# Patient Record
Sex: FEMALE | Race: ASIAN | NOT HISPANIC OR LATINO | Employment: FULL TIME
[De-identification: names, ages, dates, MRNs, and addresses within clinical notes are randomized per-mention and may not be internally consistent; named-entity substitution may affect disease eponyms.]

---

## 2017-06-24 ENCOUNTER — HEALTH MAINTENANCE LETTER (OUTPATIENT)
Age: 27
End: 2017-06-24

## 2019-03-05 ENCOUNTER — OFFICE VISIT - HEALTHEAST (OUTPATIENT)
Dept: FAMILY MEDICINE | Facility: CLINIC | Age: 29
End: 2019-03-05

## 2019-03-05 ENCOUNTER — COMMUNICATION - HEALTHEAST (OUTPATIENT)
Dept: TELEHEALTH | Facility: CLINIC | Age: 29
End: 2019-03-05

## 2019-03-05 DIAGNOSIS — Z01.84 IMMUNITY STATUS TESTING: ICD-10-CM

## 2019-03-05 ASSESSMENT — MIFFLIN-ST. JEOR: SCORE: 1205.79

## 2019-03-06 ENCOUNTER — COMMUNICATION - HEALTHEAST (OUTPATIENT)
Dept: FAMILY MEDICINE | Facility: CLINIC | Age: 29
End: 2019-03-06

## 2019-03-06 LAB
HBV SURFACE AB SERPL IA-ACNC: POSITIVE M[IU]/ML
VZV IGG SER QL IA: POSITIVE

## 2020-03-13 ENCOUNTER — VIRTUAL VISIT (OUTPATIENT)
Dept: FAMILY MEDICINE | Facility: OTHER | Age: 30
End: 2020-03-13

## 2020-03-17 NOTE — PROGRESS NOTES
"Date: 2020 17:05:57  Clinician: Jane Gracia  Clinician NPI: [id number]  Patient: Jose Lizama  Patient : 1990  Patient Address: [address] [place] [id number]  Patient Phone: [phone]  Visit Protocol: URI  Patient Summary:  Jose is a 30 year old ( : 1990 ) female who initiated a Visit for COVID-19 (Coronavirus) evaluation and screening. When asked the question \"Please sign me up to receive news, health information and promotions from [employer].\", Jose responded \"No\".    Jose states her symptoms started suddenly 7-9 days ago.   Her symptoms consist of rhinitis, malaise, a cough, a headache, and facial pain or pressure.   Symptom details     Nasal secretions: The color of her mucus is yellow.    Cough: Jose coughs every 5-10 minutes and her cough is not more bothersome at night. Phlegm comes into her throat when she coughs. She does not believe her cough is caused by post-nasal drip. The color of the phlegm is yellow.     Facial pain or pressure: The facial pain or pressure feels worse when bending over or leaning forward.     Headache: She states the headache is moderate (4-6 on a 10 point pain scale).      Jose denies having wheezing, ear pain, sore throat, nasal congestion, fever, teeth pain, chills, and myalgias. She also denies double sickening (worsening symptoms after initial improvement), taking antibiotic medication for the symptoms, having recent facial or sinus surgery in the past 60 days, and having a sinus infection within the past year. She is not experiencing dyspnea.   Precipitating events  She has not recently been exposed to someone with influenza. Jose has been in close contact with the following high risk individuals: children under the age of 5.   Pertinent COVID-19 (Coronavirus) information  Jose has not traveled internationally in the last 14 days before the start of her symptoms.   Jose has not had close contact with a laboratory confirmed " positive COVID-19 patient within 14 days of symptom onset.   Pertinent medical history  Jose typically gets a yeast infection when she takes antibiotics. She has used fluconazole (Diflucan) to treat previous yeast infections. 2 doses of fluconazole (Diflucan) has typically been needed for symptoms to resolve in the past.  Jose does not need a return to work/school note.   Weight: 126 lbs   Jose smokes or uses smokeless tobacco.   She denies pregnancy and denies breastfeeding. She does not menstruate.   Additional information as reported by the patient (free text): I am on day 7 of symptoms, which are better now than they were-- I was off and on feverish (100.4-102.6) but haven't had a fever in 3 days. I had shortness of breath and respiratory depression during that time, am not experiencing SOB currently, however it is painful to inhale (painful burning sensation in my chest), and though I am a cigarette smoker I have drastically reduced my intake to almost nothing since I became sick. 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   Weight: 126 lbs    MEDICATIONS: Alexey (30) oral, ALLERGIES: nickel  Clinician Response:  Dear Jose,  Based on the information provided, you have acute bacterial sinusitis, also known as a sinus infection. Sinus infections are caused by bacteria or a virus and symptoms are almost always identical. The difference between the 2 types of infections is timing.  Sinus infections start as viral infections and symptoms improve on their own in about 7 days. If symptoms have not improved after 7 days or have even worsened, a bacterial infection may have developed.  Medication information  I am prescribing:       Doxycycline hyclate 100 mg oral tablet. Take 1 tablet by mouth 2 times per day for 7 days. There are no refills with this prescription.      Ventolin HFA 90 mcg/actuation aerosol inhaler. Inhale 2 puffs every 4-6 hours as needed for 5 days. There are no  refills with this prescription.     Certain antibiotics such as Doxycycline, levofloxacin, and moxifloxacin can cause your skin to be more sensitive to the sun. Exposure to the sun when taking these antibiotics may cause skin rash, itching, redness, or a severe sunburn. Be sure to avoid prolonged or direct exposure to the sun, especially between 10 am and 3 pm, if possible. Wear protective clothing and use sunscreen of SPF 30 or higher when you are outdoors.  Yeast infections can be a common side effect of antibiotics. The most common symptom of a yeast infection is itchiness in and around the vagina. Other signs and symptoms include burning, redness, or a thick, white vaginal discharge that looks like cottage cheese and does not have a bad smell.  If you become pregnant during this course of treatment, stop taking the medication and contact your primary care provider.  Unless you are allergic to the over-the-counter medication(s) below, I recommend using:       Acetaminophen (Tylenol or store brand) oral tablet. Take 1-2 tablets by mouth every 4-6 hours to help with the discomfort.      Ibuprofen (Advil or store brand) 200 mg oral tablet. Take 1-3 tablets (200-600 mg) by mouth every 8 hours to help with the discomfort. Make sure to take the ibuprofen with food. Do not exceed 2400 mg in 24 hours.    A decongestant such as Sudafed PE or store brand.      Guaifenesin + dextromethorphan (Robitussin DM, Mucinex DM, or store brand).     Over-the-counter medications do not require a prescription. Ask the pharmacist if you have any questions.  Self care  The following tips will keep you as comfortable as possible while you recover:     Rest    Drink plenty of water and other liquids    Take a hot shower to loosen congestion    Take a spoonful of honey to reduce your cough     Also, as your provider, I need you to know that becoming tobacco-free is the most important thing you can do to protect your current and future  health.  When to seek care  Please be seen in a clinic or urgent care if any of the following occur:     Symptoms do not start to improve after 3 days of treatment    New symptoms develop, or symptoms become worse     It is possible to have an allergic reaction to an antibiotic even if you have not had one in the past. If you notice a new rash, significant swelling, or difficulty breathing, stop taking this medication immediately and go to a clinic or urgent care.  Additional treatment plan   Dear Jose,  Based on the information you have provided, it does not appear you need Coronavirus (COVID-19) testing.   At this time, we recommend testing primarily for those people who have symptoms of cough and fever and have either traveled to a known area of infection or have been exposed to someone with laboratory confirmed Coronavirus by close contact.   Coronavirus - General Information:   The coronavirus infection starts within 14 days of an exposure.  Symptoms are those of a respiratory infection (such as fever, cough).   If you have not had symptoms by day 15, you should be considered uninfected by coronavirus.   Coronavirus - Symptoms:    The coronavirus can cause a respiratory illness, such as bronchitis or pneumonia.  The most common symptoms are: cough, fever, and shortness of breath.   Other symptoms are: body aches, chills, diarrhea, fatigue, headache, runny nose, and sore throat   Coronavirus - Exposure Risk Factors:   Exposure to a person who has been diagnosed with coronavirus.  Travel from an area with recent local transmission of coronavirus.  The CDC (www.cdc.gov) has the most up-to-date list of where the coronavirus outbreak is occurring.   Coronavirus - Spreading:    The virus likely spreads through respiratory droplets produced when a person coughs or sneezes. These respiratory droplets can travel approximately 6 feet and can remain on surfaces. Common disinfectants will kill the virus.  The CDC  currently does not recommend healthy people wear masks.   Coronavirus - Protect Yourself:    Avoid close contact with people known to have this new coronavirus infection.  Wash hands often with soap and water or alcohol-based hand .  Avoid touching the eyes, nose or mouth.   Thank you for limiting contact with others, wearing a simple mask to cover your cough, practice good hand hygiene habits and accessing our virtual services where possible to limit the spread of this virus.  For more information about COVID19 and options for caring for yourself at home, please visit the CDC website at https://www.cdc.gov/coronavirus/2019-ncov/about/steps-when-sick.html   For more options for care at [facility], please visit our website at [email or web address]     Diagnosis: Cough  Diagnosis ICD: R05  Prescription: Ventolin HFA 90 mcg/actuation inhalation HFA aerosol inhaler 1 200 inhalation canister, 5 days supply. Inhale 2 puffs every 4-6 hours as needed for 5 days. Refills: 0, Refill as needed: no, Allow substitutions: yes  Prescription: doxycycline hyclate 100 mg oral tablet 14 tablet, 7 days supply. Take 1 tablet by mouth 2 times per day for 7 days. Refills: 0, Refill as needed: no, Allow substitutions: yes  Pharmacy: [id number] - [phone] - [address] [place] [address]

## 2020-03-31 ENCOUNTER — VIRTUAL VISIT (OUTPATIENT)
Dept: FAMILY MEDICINE | Facility: OTHER | Age: 30
End: 2020-03-31

## 2020-03-31 NOTE — PROGRESS NOTES
"Date: 2020 13:32:51  Clinician: Filemon Dong  Clinician NPI: [id number]  Patient: Jose Lizama  Patient : 1990  Patient Address: [address] [place] [address]  Patient Phone: [phone]  Visit Protocol: URI  Patient Summary:  Jose is a 30 year old ( : 1990 ) female who initiated a Visit for COVID-19 (Coronavirus) evaluation and screening. When asked the question \"Please sign me up to receive news, health information and promotions from [employer].\", Jose responded \"No\".    Jose states her symptoms started suddenly 2-3 weeks ago. After her symptoms started, they improved and then got worse again.   Her symptoms consist of a headache, a cough, and enlarged lymph nodes.   Symptom details     Cough: Jose coughs a few times an hour and her cough is more bothersome at night. Phlegm comes into her throat when she coughs. She does not believe her cough is caused by post-nasal drip. The color of the phlegm is yellow.     Headache: She states the headache is mild (1-3 on a 10 point pain scale).      Jose denies having rhinitis, teeth pain, fever, facial pain or pressure, myalgias, chills, wheezing, sore throat, nasal congestion, malaise, and ear pain. She also denies having a sinus infection within the past year, taking antibiotic medication for the symptoms, and having recent facial or sinus surgery in the past 60 days. She is not experiencing dyspnea.   Precipitating events  She has not recently been exposed to someone with influenza. Jose has not been in close contact with any high risk individuals.   Pertinent COVID-19 (Coronavirus) information  Jose has not traveled internationally or to the areas where COVID-19 (Coronavirus) is widespread, including cruise ship travel in the last 14 days before the start of her symptoms.   Jose has not had a close contact with a laboratory-confirmed COVID-19 patient within 14 days of symptom onset. She also has not had a close contact " with a suspected COVID-19 patient within 14 days of symptom onset.   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 She provides direct patient care. She does not live with a healthcare worker.   Pertinent medical history  Jose typically gets a yeast infection when she takes antibiotics. She has used fluconazole (Diflucan) to treat previous yeast infections. 2 doses of fluconazole (Diflucan) has typically been needed for symptoms to resolve in the past.  Jose needs a return to work/school note.   Weight: 126 lbs   Jose smokes or uses smokeless tobacco.   She denies pregnancy and denies breastfeeding. She has menstruated in the past month.   Additional information as reported by the patient (free text): I need a return to work note by tomorrow &amp; a note stating that you recommended I quarantine based off of my symptoms. I woke up on 03/04 with a headache, cough, chills, body aches, plus SOB and wheezing when lying down and at night. Had a fever ranging from 100.6-102.6 for 3-4 days. I have been fever free for weeks but my cough was lingering and work didn't want me. I had been coughing up clear or brown mucous which I attributed to my cigarette use, now to 0.5/day. Been isolating.Call me please.   Weight: 126 lbs  A synchronous phone visit was initiated by the provider for the following reason: Patient requesting phone call in order to review current symptoms and return to work recommendations.    MEDICATIONS: Jasmiel (30) oral, ALLERGIES: nickel  Clinician Response:  Dear Jose,   Based on the information you have provided, you do have symptoms that are consistent with Coronavirus (COVID-19).  The coronavirus causes mild to severe respiratory illness with the most common symptoms including fever, cough and difficulty breathing. Unfortunately, many viruses cause similar symptoms and it can be difficult to distinguish between viruses, especially in mild cases, so  we are presuming that anyone with cough or fever has coronavirus at this time.  Coronavirus/COVID-19 has reached the point of community spread in [place], meaning that we are finding the virus in people with no known exposure risk for madelaine the virus. Given the increasing commonness of coronavirus in the community we are no longer testing patients who are not critically ill.  If you are a health care worker, you should refer to your employee health office for instructions about testing and returning to work.  For everyone else who has cough or fever, you should assume you are infected with coronavirus. Since you will not be tested but have symptoms that may be consistent with coronavirus, the CDC recommends you stay in self-isolation until these three things have happened:    You have had no fever for at least 72 hours (that is three full days of no fever without the use of medicine that reduces fevers)    AND   Other symptoms have improved (for example, when your cough or shortness of breath have improved)   AND   At least 7 days have passed since your symptoms first appeared.   How to Isolate:   Isolate yourself at home.  Do Not allow any visitors  Do Not go to work or school  Do Not go to Buddhism,  centers, shopping, or other public places.  Do Not shake hands.  Avoid close contact with others (hugging, kissing).   Protect Others:   Cover Your Mouth and Nose with a mask, disposable tissue or wash cloth to avoid spreading germs to others.  Wash your hands and face frequently with soap and water.   We know it can be scary to hear that you might have COVID-19. Our team can help track your symptoms and make sure you are doing ok over the next two weeks using a program called [employer] to keep in touch. When you receive an email from [employer], please consider enrolling in our monitoring program. There is no cost to you for monitoring. Here is a URL where you can learn more:  [email or web address]  Managing Symptoms:   At this time, we primarily recommend Tylenol (Acetaminophen) for fever or pain. If you have liver or kidney problems, contact your primary care provider for instructions on use of tylenol. Adults can take 650 mg (two 325 mg pills) by mouth every 4-6 hours as needed OR 1,000 mg (two 500 mg pills) every 8 hours as needed. MAXIMUM DAILY DOSE: 3,000mg. For children, refer to dosing on bottle based on age or weight.   If you develop significant shortness of breath that prevents you from doing normal activities, please call 911 or proceed to the nearest emergency room and alert them immediately that you have been in self-isolation for possible coronavirus.  If you have a higher risk medical condition such as cancer, heart failure, end stage renal disease on dialysis or have a transplant, please reach out to your specialist's clinic to advise them of your [facility] visit should you not improve within the next two days.   For more information about COVID19 and options for caring for yourself at home, please visit the CDC website at https://www.cdc.gov/coronavirus/2019-ncov/about/steps-when-sick.htmlFor more options for care at [facility], please visit our website at [email or web address]    COVID-19 (Coronavirus) General Information  With the increase in the number of COVID-19 (Coronavirus) cases, we understand you may have some questions. Below is some helpful information on COVID-19 (Coronavirus).  How can I protect myself and others from the COVID-19 (Coronavirus)?  Because there is currently no vaccine to prevent infection, the best way to protect yourself is to avoid being exposed to this virus. Put distance between yourself and other people if COVID-19 (Coronavirus) is spreading in your community. The virus is thought to spread mainly from person-to-person.     Between people who are in close contact with one another (within about 6  about) for a prolonged period (10 minutes or longer).    Through respiratory droplets produced when an infected person coughs or sneezes.     The CDC recommends the following additional steps to protect yourself and others:     Wash your hands often with soap and water for at least 20 seconds, especially after blowing your nose, coughing, or sneezing; going to the bathroom; and before eating or preparing food.  Use an alcohol-based hand  that contains at least 60 percent alcohol if soap and water are not available.        Avoid touching your eyes, nose and mouth with unwashed hands.    Avoid close contact with people who are sick.    Stay home when you are sick.    Cover your cough or sneeze with a tissue, then throw the tissue in the trash.    Clean and disinfect frequently touched objects and surfaces.     You can help stop COVID-19 (Coronavirus) by knowing the signs and symptoms:     Fever    Cough    Shortness of breath     Contact your healthcare provider if   Develop symptoms   AND   Have been in close contact with a person known to have COVID-19 (Coronavirus) or live in or have recently traveled from an area with ongoing spread of COVID-19 (Coronavirus). Call ahead before you go to a doctor's office or emergency room. Tell them about your recent travel and your symptoms.   For the most up to date information, visit the CDC's website.  Self-monitoring  Self-monitoring means people should monitor themselves for fever by taking their temperatures twice a day and remain alert for a cough or difficulty breathing.  It is important to check your health two times each day for 14 days after a potential exposure to a person with COVID-19 (Coronavirus) or after travel from a location where COVID-19 (Coronavirus) is widespread. If you have been exposed to a person with COVID-19 (Coronavirus), it may take up to 14 days to know if you will get sick. Follow the steps below to check and record your health.     Take  your temperature with a thermometer twice a day, once in the morning and once in the evening, and watch for a cough or difficulty breathing for 14 days.    Write down your temperature and any COVID-19 symptoms you may have: feeling feverish, coughing, or difficulty breathing.    Stay home from work or school.    Do not take public transportation, taxis, or ride-shares.    Avoid crowded places (such as shopping centers and movie theaters) and limit your activities in public.    Keep your distance from others (about 6 feet or 2 meters).    If you get sick with fever, cough, or trouble breathing, contact your healthcare provider and tell them about your recent travel and/or your symptoms.    If you need to seek medical care for other reasons, such as dialysis, call ahead to your doctor and tell them about your recent travel.     Steps to help prevent the spread of COVID-19 (Coronavirus) if you are sick  If you are sick with COVID-19 (Coronavirus) or suspect you are infected with the virus that causes COVID-19 (Coronavirus), follow the steps below to help prevent the disease from spreading&nbsp;to people in your home and community.     Stay home except to get medical care. Home isolation may be started in consultation with your healthcare clinician.    Separate yourself from other people and animals in your home.    Call ahead before visiting your doctor if you have a medical appointment.    Wear a facemask when you are around other people.    Cover your cough and sneezes.    Clean your hands often.    Avoid sharing personal household items.    Clean and disinfect frequently touched objects and surfaces everyday.    You will need to have someone drop off medications or household supplies (if needed) at your house without coming inside or in contact with you or others living in your house.    Monitor your symptoms and seek prompt medical care if your illness is worsening (e.g. Difficulty breathing).    Discontinue home  "isolation only in consultation with your healthcare provider.     For more detailed and up to date information on what to do if you are sick, visit this link: What to Do If You Are Sick With COVID-19.  Do I need to be tested for COVID-19 (Coronavirus)?     Not everyone needs to be tested for COVID-19 (Coronavirus). Decisions on which patients receive testing will be based on the local spread of COVID-19 (Coronavirus) as well as the symptoms. Your healthcare provider will make the final decision on whether you should be tested.    In the meantime, if you have concerns that you may have been exposed, it is reasonable to practice \"social distancing.\"&nbsp; If you are ill with a cold or flu-like illness, please monitor your symptoms and call your healthcare provider if your symptoms worsen.    For more up to date information, visit this link: COVID-19 (Coronavirus) Frequently Asked Questions and Answers.      Diagnosis: Cough  Diagnosis ICD: R05  Additional Clinician Notes: It was nice talking with you by phone.  Please take advantage of the [employer] which is a free service which provides a daily email or text message to ensure desired symptom improvement/resolution.  Information about this program is included in today's visit summary.  I have included the requested work note as well that allows return to work 4/1/20 as scheduled.  Take care :)  Synchronous Triage: phone, status: completed, duration: 501 seconds  "

## 2021-01-01 ENCOUNTER — TRANSFERRED RECORDS (OUTPATIENT)
Dept: MULTI SPECIALTY CLINIC | Facility: CLINIC | Age: 31
End: 2021-01-01

## 2021-01-01 LAB — PAP SMEAR - HIM PATIENT REPORTED: NORMAL

## 2021-03-21 ENCOUNTER — HEALTH MAINTENANCE LETTER (OUTPATIENT)
Age: 31
End: 2021-03-21

## 2021-06-02 VITALS — HEIGHT: 61 IN | WEIGHT: 119.8 LBS | BODY MASS INDEX: 22.62 KG/M2

## 2021-06-18 NOTE — LETTER
Letter by Adenike Charles CNP at      Author: Adenike Charles CNP Service: -- Author Type: --    Filed:  Encounter Date: 3/6/2019 Status: (Other)       Jose Lizama  [address] [facility] [address]             March 6, 2019         Dear Ms. Lizama,    Below are the results from your recent visit:    Resulted Orders   Varicella Zoster Antibody, IgG   Result Value Ref Range    Varicella Zoster Antibody IgG Positive     Narrative    Assay interference due to circulating antibodies against HIV, Hepatitis A, Hepatitis B, Hepatitis C, HAMA and Rheumatoid Factor has not been evaluated.    The assay performance in detecting antibodies to Varicella Zoster in individuals vaccinated with the FDA-licensed VZV vaccine has not been established.    Negative: Absence of detectable Varicella Zoster IgG antibodies. A negative result indicates no detectable VZV antibody, but does not rule out acute infection. It should be noted that the test usually scores negative in infected patients during the incubation period and the early stages of infection.    Equivocal: Suggest recollection.    Positive: Presence of detectable Varicella Zoster IgG antibodies. A positive result generally indicates exposure to the pathogen or administration of specific immune-globulins, but it is no indication of active infection or stage of disease.   Hepatitis B Surface Antibody (Anti-HBs) Vaccine Check   Result Value Ref Range    HBV Surface Ab (Vaccine Check) Positive Positive       You are immune to Hep B and Varicella.     Please call with questions or contact us using [employer].    Sincerely,        Electronically signed by Adenike Charles CNP

## 2021-06-24 NOTE — TELEPHONE ENCOUNTER
----- Message from Adenike Charles CNP sent at 3/6/2019  1:22 PM CST -----  Letter created for patient. Please call let her know that she is adequately immune to Help B and Varicella. She may want to  the letter to give to her new employer.   Thanks.   Adenike Charles CNP

## 2021-06-24 NOTE — TELEPHONE ENCOUNTER
LM on pt's identifiable VM notifying pt of LF's note below. Letter printed and left for pt to  at .

## 2021-06-24 NOTE — PROGRESS NOTES
Assessment/Plan:     1. 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 Needs verification of Hepatitis B and Varicella immunity. Labs drawn today.     - Varicella Zoster Antibody, IgG  - Hepatitis B Surface Antibody (Anti-HBs) Vaccine Check      Subjective:     Jose Lizama is a 29 y.o. female who presents for immunity status testing. 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 Needs titers for varicella and Hepatitis B. Other immunizations are up to date and documented. Denies other health concerns or questions.     The following portions of the patient's history were reviewed and updated as appropriate: allergies, current medications, past family history, past medical history, past surgical history and problem list.    No past medical history on file.  No past surgical history on file.  Social History     Socioeconomic History     Marital status: Single     Spouse name: Not on file     Number of children: Not on file     Years of education: Not on file     Highest education level: Not on file   Occupational History     Not on file   Social Needs     Financial resource strain: Not on file     Food insecurity:     Worry: Not on file     Inability: Not on file     Transportation needs:     Medical: Not on file     Non-medical: Not on file   Tobacco Use     Smoking status: Current Every Day Smoker     Smokeless tobacco: Never Used   Substance and Sexual Activity     Alcohol use: Not on file     Drug use: Not on file     Sexual activity: Not on file   Lifestyle     Physical activity:     Days per week: Not on file     Minutes per session: Not on file     Stress: Not on file   Relationships     Social connections:     Talks on phone: Not on file     Gets together: Not on file     Attends [employer] service: Not on file     Active member of club or organization: Not on file     Attends meetings of clubs or organizations: Not on file     Relationship status: Not on  "file     Intimate partner violence:     Fear of current or ex partner: Not on file     Emotionally abused: Not on file     Physically abused: Not on file     Forced sexual activity: Not on file   Other Topics Concern     Not on file   Social History Narrative     Not on file     No family history on file.      Review of Systems   Pertinent items are noted in HPI.     Objective:     /80   Ht 5' 1\" (1.549 m)   Wt 119 lb 12.8 oz (54.3 kg)   BMI 22.64 kg/m        General appearance: alert, appears stated age and cooperative  Neurologic: Grossly normal  Note was completed by nurse midwifery student.  I agree with the above assessment and plan completed today.  Adenike Charles APRN, CNP       "

## 2021-09-05 ENCOUNTER — HEALTH MAINTENANCE LETTER (OUTPATIENT)
Age: 31
End: 2021-09-05

## 2022-04-17 ENCOUNTER — HEALTH MAINTENANCE LETTER (OUTPATIENT)
Age: 32
End: 2022-04-17

## 2022-10-23 ENCOUNTER — HEALTH MAINTENANCE LETTER (OUTPATIENT)
Age: 32
End: 2022-10-23

## 2023-06-01 ENCOUNTER — HEALTH MAINTENANCE LETTER (OUTPATIENT)
Age: 33
End: 2023-06-01

## 2023-10-31 ENCOUNTER — LAB REQUISITION (OUTPATIENT)
Dept: LAB | Facility: CLINIC | Age: 33
End: 2023-10-31

## 2023-10-31 LAB — SARS-COV-2 RNA RESP QL NAA+PROBE: NEGATIVE

## 2023-10-31 PROCEDURE — 87635 SARS-COV-2 COVID-19 AMP PRB: CPT | Performed by: INTERNAL MEDICINE

## 2023-11-15 ENCOUNTER — LAB REQUISITION (OUTPATIENT)
Dept: LAB | Facility: CLINIC | Age: 33
End: 2023-11-15

## 2023-11-15 PROCEDURE — 87635 SARS-COV-2 COVID-19 AMP PRB: CPT | Performed by: INTERNAL MEDICINE

## 2023-11-16 LAB — SARS-COV-2 RNA RESP QL NAA+PROBE: NEGATIVE

## 2023-11-17 ENCOUNTER — LAB REQUISITION (OUTPATIENT)
Dept: LAB | Facility: CLINIC | Age: 33
End: 2023-11-17

## 2023-11-17 LAB — SARS-COV-2 RNA RESP QL NAA+PROBE: NEGATIVE

## 2023-11-17 PROCEDURE — 87635 SARS-COV-2 COVID-19 AMP PRB: CPT | Performed by: INTERNAL MEDICINE

## 2024-01-29 ENCOUNTER — OFFICE VISIT (OUTPATIENT)
Dept: FAMILY MEDICINE | Facility: CLINIC | Age: 34
End: 2024-01-29
Payer: COMMERCIAL

## 2024-01-29 ENCOUNTER — TELEPHONE (OUTPATIENT)
Dept: BEHAVIORAL HEALTH | Facility: CLINIC | Age: 34
End: 2024-01-29

## 2024-01-29 ENCOUNTER — ANCILLARY PROCEDURE (OUTPATIENT)
Dept: GENERAL RADIOLOGY | Facility: CLINIC | Age: 34
End: 2024-01-29
Attending: PHYSICIAN ASSISTANT
Payer: COMMERCIAL

## 2024-01-29 VITALS
DIASTOLIC BLOOD PRESSURE: 64 MMHG | BODY MASS INDEX: 27.03 KG/M2 | OXYGEN SATURATION: 99 % | WEIGHT: 143.2 LBS | TEMPERATURE: 98.5 F | SYSTOLIC BLOOD PRESSURE: 118 MMHG | HEART RATE: 75 BPM | RESPIRATION RATE: 16 BRPM | HEIGHT: 61 IN

## 2024-01-29 DIAGNOSIS — M62.830 BACK MUSCLE SPASM: ICD-10-CM

## 2024-01-29 DIAGNOSIS — R05.9 COUGH, UNSPECIFIED TYPE: Primary | ICD-10-CM

## 2024-01-29 DIAGNOSIS — R05.9 COUGH, UNSPECIFIED TYPE: ICD-10-CM

## 2024-01-29 PROBLEM — D24.1 FIBROADENOMA OF BREAST, RIGHT: Status: ACTIVE | Noted: 2024-01-29

## 2024-01-29 PROBLEM — F12.90 MARIJUANA SMOKER: Status: ACTIVE | Noted: 2024-01-29

## 2024-01-29 PROCEDURE — 99203 OFFICE O/P NEW LOW 30 MIN: CPT | Performed by: PHYSICIAN ASSISTANT

## 2024-01-29 PROCEDURE — 71046 X-RAY EXAM CHEST 2 VIEWS: CPT | Mod: TC | Performed by: RADIOLOGY

## 2024-01-29 RX ORDER — ALBUTEROL SULFATE 90 UG/1
2 AEROSOL, METERED RESPIRATORY (INHALATION) EVERY 6 HOURS PRN
Qty: 18 G | Refills: 0 | Status: SHIPPED | OUTPATIENT
Start: 2024-01-29

## 2024-01-29 RX ORDER — BUPROPION HYDROCHLORIDE 300 MG/1
1 TABLET ORAL
COMMUNITY
Start: 2023-01-31 | End: 2024-01-29

## 2024-01-29 RX ORDER — ETONOGESTREL AND ETHINYL ESTRADIOL .015; .12 MG/D; MG/D
INSERT, EXTENDED RELEASE VAGINAL
COMMUNITY
Start: 2024-01-15 | End: 2024-07-12

## 2024-01-29 RX ORDER — CYCLOBENZAPRINE HCL 10 MG
10 TABLET ORAL
Qty: 30 TABLET | Refills: 0 | Status: SHIPPED | OUTPATIENT
Start: 2024-01-29 | End: 2024-07-12

## 2024-01-29 ASSESSMENT — PATIENT HEALTH QUESTIONNAIRE - PHQ9
SUM OF ALL RESPONSES TO PHQ QUESTIONS 1-9: 8
10. IF YOU CHECKED OFF ANY PROBLEMS, HOW DIFFICULT HAVE THESE PROBLEMS MADE IT FOR YOU TO DO YOUR WORK, TAKE CARE OF THINGS AT HOME, OR GET ALONG WITH OTHER PEOPLE: SOMEWHAT DIFFICULT
SUM OF ALL RESPONSES TO PHQ QUESTIONS 1-9: 8

## 2024-01-29 ASSESSMENT — PAIN SCALES - GENERAL: PAINLEVEL: NO PAIN (1)

## 2024-01-29 NOTE — TELEPHONE ENCOUNTER
Patient returned call to TC. Coordinator explained TC services. Scheduled initial TC Therapy 2/5/2024.   Explained will be assigned questionnaires in [place], encouraged to complete prior to the scheduled appointment.  Referral will be closed, reply sent to referral source and tracker completed.    Roya Palacios  Transition Clinic Coordinator  01/29/24 12:15 PM

## 2024-01-29 NOTE — TELEPHONE ENCOUNTER
Reached patient who said in middle of something and will call back.     Roya Palacios  Transition Clinic Coordinator  01/29/24 12:07 PM

## 2024-01-29 NOTE — PROGRESS NOTES
"  Assessment & Plan     Cough, unspecified type  Chest xray was unremarkable. Lung exam today was normal along with oxygen saturation. Reassurance given to patient today.  I recommend starting inhaler on a regular basis for the next few days then go to as needed. I also encourage patient to stop smoking marijuana as this would probably help her cough significantly. If symptoms do not improve over the next few weeks follow up in clinic. Patient agree's with this plan and has no further questions  - XR Chest 2 Views; Future  - albuterol (PROAIR HFA/PROVENTIL HFA/VENTOLIN HFA) 108 (90 Base) MCG/ACT inhaler; Inhale 2 puffs into the lungs every 6 hours as needed for shortness of breath, wheezing or cough    Back muscle spasm  Likely due to the cough.I recommend muscle relaxant to see if this helps improve or relieve symptoms. Side effects educated to patient. Patient agree's with this plan and has no further questions  - cyclobenzaprine (FLEXERIL) 10 MG tablet; Take 1 tablet (10 mg) by mouth nightly as needed for muscle spasms      BMI  Estimated body mass index is 26.86 kg/m  as calculated from the following:    Height as of this encounter: 1.555 m (5' 1.22\").    Weight as of this encounter: 65 kg (143 lb 3.2 oz).         Vita Garcia is a 33 year old, presenting for the following health issues:  Back Pain and Cough    Via the Health Maintenance questionnaire, the patient has reported the following services have been completed -Cervical Cancer Screening, this information has been sent to the abstraction team.    History of Present Illness       Reason for visit:  Back hurts while coughing. Covid + in Nov and then sick again around [place] and smoked Marijuana throughout sickness. Concern about damages to lungs.  Symptom onset:  More than a month  Symptoms include:  Back hurts while coughing  Symptom intensity:  Mild  Symptom progression:  Improving  Had these symptoms before:  No    She eats 2-3 servings of " "fruits and vegetables daily.She consumes 0 sweetened beverage(s) daily.She exercises with enough effort to increase her heart rate 9 or less minutes per day.  She exercises with enough effort to increase her heart rate 3 or less days per week. She is missing 7 dose(s) of medications per week.     Additional provider notes :   Cough - Started in November when was positive for COVID. Had another illness of URI in December again which made the cough come back worse. Over the last month it has been slowly improving. However, she is still having this cough and taking big deep breaths hurts her chest and back and feels tight. The cough in not very productive. The pain in the chest is only with coughing.  She has tried OTC [place] expectorants. She tried stopped smoking for 3 days and that helped.  No fevers or chills. Night sweats but not out of the normal for her     Review of Systems  Constitutional, HEENT, cardiovascular, pulmonary, gi and gu systems are negative, except as otherwise noted.      Objective    /64   Pulse 75   Temp 98.5  F (36.9  C) (Oral)   Resp 16   Ht 1.555 m (5' 1.22\")   Wt 65 kg (143 lb 3.2 oz)   SpO2 99%   BMI 26.86 kg/m    Body mass index is 26.86 kg/m .  Physical Exam   GENERAL: alert and no distress  NECK: no adenopathy, no asymmetry, masses, or scars  RESP: lungs clear to auscultation - no rales, rhonchi or wheezes  CV: regular rate and rhythm, normal S1 S2, no S3 or S4, no murmur, click or rub, no peripheral edema  MS: no gross musculoskeletal defects noted, no edema    CXR - Reviewed and interpreted by me Normal- no infiltrates, effusions, pneumothoraces, cardiomegaly or masses        Signed Electronically by: JEFFERY Mtz    "

## 2024-01-29 NOTE — TELEPHONE ENCOUNTER
----- Message from Louie Mora sent at 1/29/2024 11:03 AM CST -----  Transition Clinic Referral   [place]       Please Check Type of Referral Requested:       __X__THERAPY: The Transition clinic is able to schedule patients without current medical insurance; these patient will be referred to our Social Work Care Coordinator for Medical Insurance              Assistance. We are open for referral for psychotherapy. Patient is referred from:  Other intake      ____MEDICATION:   Referrals for Medication are ONLY accepted from the following areas (select): na                                        Suboxone and Opioid Management Referrals are automatically denied.   TC Psychiatry cannot see patient without active medical insurance.   Next level of psychiatry care must be within 12 months.  TC Psychiatry cannot accept patient with next level of care scheduled with PCP.  The transition clinic cannot follow patients who are on a restricted recipient program.    ___ Long-Acting Injection (MARES)?    Is referred patient receiving a long-acting injection (MARES)?  If YES, provide the following:    Name and dose of Medication: na  Date Injection was last administered to patient: na  Next Long- Acting injection Due Date: na    Is referred patient transferring from [facility]?  If YES, provide the following:     ___  MARES will be receiving MARES at [facility] [place]  ___  MARES will be administered per an IRTS or elsewhere in the community       GUARDIAN: If your patient is not their own Guardian, please provide the following:    Guardian Name:  Guardian Contact Information (Phone & Email) :  Guardian Address:     FOSTER CARE PROVIDER: If your patient lives at a Licensed Foster Care, please provide the following:    Foster Provider:  Foster Provider Contact Information (Phone & Email):  Foster Provider Address:     Referring Provider Contact Name: Intake ; Phone Number: [phone]    Reason  for Transition Clinic Referral: dep/anxiety     Next Level of Care Patient Will Be Transitioned To:   Name: Jose Lizama MRN: [id number]  Date: 5/20/2024 Status: Scheduled  Time: 1:30 PM Length: 60  Visit Type: ADULT PSYCHOTHERAPY NEW [[id number]] Copay: $0.00  Provider: Leelee Tee LICSW Department: [facility]  Encounter #: [id number]          What Would Be Helpful from the Transition Clinic: bridge gap     Needs: NO    Does Patient Have Access to Technology: yes    Patient E-mail Address: [email or web address]    Current Patient Phone Number: [phone];     Clinician Gender Preference (if applicable): YES: female    [place] location preference: [place]      (Master Form: Updated 11/28/2023)

## 2024-02-05 ENCOUNTER — VIRTUAL VISIT (OUTPATIENT)
Dept: BEHAVIORAL HEALTH | Facility: CLINIC | Age: 34
End: 2024-02-05
Payer: COMMERCIAL

## 2024-02-05 DIAGNOSIS — F33.0 MILD RECURRENT MAJOR DEPRESSION (H): Primary | ICD-10-CM

## 2024-02-05 DIAGNOSIS — F41.1 GENERALIZED ANXIETY DISORDER: ICD-10-CM

## 2024-02-05 ASSESSMENT — ANXIETY QUESTIONNAIRES
7. FEELING AFRAID AS IF SOMETHING AWFUL MIGHT HAPPEN: SEVERAL DAYS
8. IF YOU CHECKED OFF ANY PROBLEMS, HOW DIFFICULT HAVE THESE MADE IT FOR YOU TO DO YOUR WORK, TAKE CARE OF THINGS AT HOME, OR GET ALONG WITH OTHER PEOPLE?: SOMEWHAT DIFFICULT
3. WORRYING TOO MUCH ABOUT DIFFERENT THINGS: SEVERAL DAYS
2. NOT BEING ABLE TO STOP OR CONTROL WORRYING: MORE THAN HALF THE DAYS
5. BEING SO RESTLESS THAT IT IS HARD TO SIT STILL: NOT AT ALL
GAD7 TOTAL SCORE: 8
6. BECOMING EASILY ANNOYED OR IRRITABLE: MORE THAN HALF THE DAYS
1. FEELING NERVOUS, ANXIOUS, OR ON EDGE: SEVERAL DAYS
GAD7 TOTAL SCORE: 8
4. TROUBLE RELAXING: SEVERAL DAYS
GAD7 TOTAL SCORE: 8
IF YOU CHECKED OFF ANY PROBLEMS ON THIS QUESTIONNAIRE, HOW DIFFICULT HAVE THESE PROBLEMS MADE IT FOR YOU TO DO YOUR WORK, TAKE CARE OF THINGS AT HOME, OR GET ALONG WITH OTHER PEOPLE: SOMEWHAT DIFFICULT
7. FEELING AFRAID AS IF SOMETHING AWFUL MIGHT HAPPEN: SEVERAL DAYS

## 2024-02-05 ASSESSMENT — COLUMBIA-SUICIDE SEVERITY RATING SCALE - C-SSRS
2. HAVE YOU ACTUALLY HAD ANY THOUGHTS OF KILLING YOURSELF?: NO
TOTAL  NUMBER OF ABORTED OR SELF INTERRUPTED ATTEMPTS LIFETIME: NO
6. HAVE YOU EVER DONE ANYTHING, STARTED TO DO ANYTHING, OR PREPARED TO DO ANYTHING TO END YOUR LIFE?: NO
1. HAVE YOU WISHED YOU WERE DEAD OR WISHED YOU COULD GO TO SLEEP AND NOT WAKE UP?: NO
TOTAL  NUMBER OF INTERRUPTED ATTEMPTS LIFETIME: NO
ATTEMPT LIFETIME: NO

## 2024-02-05 ASSESSMENT — PATIENT HEALTH QUESTIONNAIRE - PHQ9
10. IF YOU CHECKED OFF ANY PROBLEMS, HOW DIFFICULT HAVE THESE PROBLEMS MADE IT FOR YOU TO DO YOUR WORK, TAKE CARE OF THINGS AT HOME, OR GET ALONG WITH OTHER PEOPLE: SOMEWHAT DIFFICULT
SUM OF ALL RESPONSES TO PHQ QUESTIONS 1-9: 11
SUM OF ALL RESPONSES TO PHQ QUESTIONS 1-9: 11

## 2024-02-05 NOTE — PROGRESS NOTES
"    Transition Clinic - Initial Visit Progress Note    Patient  Name: Jose Lizama, : 1990    Date:  2024       Service Type:  Mental Health Initial Visit       VISIT TIME START: 1230  VISIT TIME END: 130     Session Length:   TC Session Length: 30 (16-37 Minutes)    Visit #: 1    Attendees:  TC Attendees: Client alone    Service Modality:  Service Modality: Video Visit:      Provider verified identity through the following two step process.  Patient provided:  Patient  and Patient address    Telemedicine Visit: The patient's condition can be safely assessed and treated via synchronous audio and visual telemedicine encounter.      Reason for Telemedicine Visit: Patient convenience (e.g. access to timely appointments / distance to available provider)    Originating Site (Patient Location): Patient's home    Distant Site (Provider Location): [facility]    Consent:  The patient/guardian has verbally consented to: the potential risks and benefits of telemedicine (video visit) versus in person care; bill my insurance or make self-payment for services provided; and responsibility for payment of non-covered services.     Patient would like the video invitation sent by:  My Chart    Mode of Communication:  Video Conference via AmWaitsup    Distant Location (Provider):  Off-site    As the provider I attest to compliance with applicable laws and regulations related to telemedicine.    Source of Referral:  Other \"intake\" per chart review.    Informed Consent and Assessment Methods    This provider and patient discussed HIPAA, and the limits of confidentiality; including mandated reporting, the possibility of collaborative discussions with patient's primary care provider and the multidisciplinary team in the MH clinic during consultation.  Discussed the no show policy, and the benefits and possible unintended consequences of therapy.  Patient indicated " "understanding Transition Clinic services are short term, solution focused, until a referral can be made and patient can bridge to long term therapy.  Patient verbally indicated understanding the informed consent.         Presenting Concerns/  Current Stressors:  Jose Lizama is a 34 year old  who was referred to the Transition Clinic by \"intake\" for \"bridge gap dep/anx\", with next LOC described as \"[facility], 5/20/24, 1:30pm with provider REZA Wright\", per chart review of referral note.    Initial session:  Introduced transition clinic services as short term, brief psychotherapy until transition to next LOC.      Jose Lizama reports high anxiety sx related to her relationship.  She endorses some intermittent depression sx, however reports they are not the presenting problem.  Pt reports she does believe she has ADHD sx as well, however reports she is not looking for a dx or tx at this point in time.  She denies SI, HI, AH/VH at this point in time.  Pt reports she is hopeful, looking forward to weekend.  Pt reports the following protective factors:  supportive family, willingness to engage in therapy, hope for the future, attached to family/friends, embedded in a protective network, demonstrates reliability to personal/professional life.     Pt reports historical coping skills of mindfulness, bodyscans, pauses.  Writer and pt briefly discussed deep breathing, challenging negative thoughts, and locus of control.  Pt reports she would like to address attachment styles, briefly discussed this today.  Pt appears motivated, engaged and receptive to the above interventions.     ASSESSMENT:    Required Screenings: The following assessments were completed by patient for this visit:  PHQ9:       8/1/2012    10:00 AM 3/13/2013    11:00 AM 4/15/2013    10:03 AM 7/31/2013    11:30 AM 4/16/2014     2:00 PM 1/29/2024     7:38 AM 2/5/2024    12:00 PM   PHQ-9 SCORE   PHQ-9 Total Score 5 11 12 8 9     PHQ-9 Total Score " MyChart      8 (Mild depression) 11 (Moderate depression)   PHQ-9 Total Score      8 11     GAD7:       4/16/2014     2:56 PM 2/5/2024    12:01 PM   YAZMIN-7 SCORE   Total Score 11    Total Score  8 (mild anxiety)   Total Score  8     CAGE-AID:       2/5/2024    12:19 PM   CAGE-AID Total Score   Total Score 4   Total Score MyChart 4 (A total score of 2 or greater is considered clinically significant)     PROMIS 10-Global Health (all questions and answers displayed):       2/5/2024    12:17 PM   PROMIS 10   In general, would you say your health is: Good   In general, would you say your quality of life is: Very good   In general, how would you rate your physical health? Fair   In general, how would you rate your mental health, including your mood and your ability to think? Good   In general, how would you rate your satisfaction with your social activities and relationships? Good   In general, please rate how well you carry out your usual social activities and roles Fair   To what extent are you able to carry out your everyday physical activities such as walking, climbing stairs, carrying groceries, or moving a chair? Completely   In the past 7 days, how often have you been bothered by emotional problems such as feeling anxious, depressed, or irritable? Often   In the past 7 days, how would you rate your fatigue on average? Moderate   In the past 7 days, how would you rate your pain on average, where 0 means no pain, and 10 means worst imaginable pain? 2   In general, would you say your health is: 3   In general, would you say your quality of life is: 4   In general, how would you rate your physical health? 2   In general, how would you rate your mental health, including your mood and your ability to think? 3   In general, how would you rate your satisfaction with your social activities and relationships? 3   In general, please rate how well you carry out your usual social activities and roles. (This includes activities  at home, at work and in your community, and responsibilities as a parent, child, spouse, employee, friend, etc.) 2   To what extent are you able to carry out your everyday physical activities such as walking, climbing stairs, carrying groceries, or moving a chair? 5   In the past 7 days, how often have you been bothered by emotional problems such as feeling anxious, depressed, or irritable? 4   In the past 7 days, how would you rate your fatigue on average? 3   In the past 7 days, how would you rate your pain on average, where 0 means no pain, and 10 means worst imaginable pain? 2   Global Mental Health Score 12   Global Physical Health Score 14   PROMIS TOTAL - SUBSCORES 26     [place] Suicide Severity Rating Scale (Lifetime/Recent)      2/5/2024     1:00 PM   [place] Suicide Severity Rating (Lifetime/Recent)   Q1 Wish to be Dead (Lifetime) N   Q2 Non-Specific Active Suicidal Thoughts (Lifetime) N   Actual Attempt (Lifetime) N   Has subject engaged in non-suicidal self-injurious behavior? (Lifetime) N   Interrupted Attempts (Lifetime) N   Aborted or Self-Interrupted Attempt (Lifetime) N   Preparatory Acts or Behavior (Lifetime) N   Calculated C-SSRS Risk Score (Lifetime/Recent) No Risk Indicated       Mental Status Assessment:  Appearance:   Appropriate   Eye Contact:   Good   Psychomotor Behavior: Normal   Attitude:   Cooperative  Interested Friendly Pleasant  Orientation:   All  Speech     Rate / Production:  Normal/ Responsive     Volume:   Normal   Mood:    Normal  Affect:    Appropriate   Thought Content:  Clear   Thought Form:  Coherent  Logical   Insight:    Good       Safety Issues and Plan for Safety and Risk Management:     Patient denies current fears or concerns for personal safety.  Patient denies current or recent suicidal ideation or behaviors.  Patient denies current or recent homicidal ideation or behaviors.  Patient denies current or recent self injurious behavior or ideation.  Patient denies  "other safety concerns.  Recommended that patient call 911 or go to the local ED should there be a change in any of these risk factors. Reviewed safety plan with pt, pt agreed to follow, safety plan made available to pt, safety plan at the bottom of this note and in pts active mychart.       Diagnostic Criteria:  Major Depressive Disorder  CRITERIA (A-C) REPRESENT A MAJOR DEPRESSIVE EPISODE - SELECT THESE CRITERIA  A) Recurrent episode(s) - symptoms have been present during the same 2-week period and represent a change from previous functioning 5 or more symptoms (required for diagnosis)   - Depressed mood. Note: In children and adolescents, can be irritable mood.     - Diminished interest or pleasure in all, or almost all, activities.    - Psychomotor activity \"talking more'.    - Fatigue or loss of energy.    - Diminished ability to think or concentrate, or indecisiveness.     DSM5 Diagnoses: (Sustained by DSM5 Criteria Listed Above)  Diagnoses: 296.31 (F33.0) Major Depressive Disorder, Recurrent Episode, Mild _ and With anxious distress  Psychosocial & Contextual Factors: Pt reports ongoing relationship stressors.      Intervention:      Brief Psychotherapy - discussed and prioritizing the most efficient treatment., Cognitive Behavioral Therapy (CBT) - Discussed changing thoughts is the path to changing behaviors and feelings., Mindfulness Therapy - Cultivation of present-oriented, non-judgmental attitude. It helps nurtures great awareness, clarity, and acceptance of reality., Motivational Interviewing - helping to find the motivation to make positive behavior changes., Narrative Therapy - Discuss the ability to rewrite personal narrative to be more resilient., Person-Centered Therapy - Actualizes potential and moves towards increased awareness, spontaneity, trust in self and inner directedness., and Psychoanalysis - Helping understand interpretations or internal dialogue.     Collateral Reports Completed:  TC " "Collateral: [facility] [place] electronic medical records reviewed.    DATA:  Interactive Complexity: No  Crisis: No    PLAN: (Homework, other):  Provider will continue Diagnostic Assessment. Initial Treatment will focus on: Anxiety - intermittent depressed mood, attachment styles per pt request .  2.   Information in this assessment was obtained from the medical record and provided by patient who is a good historian.   3.   Follow up scheduled:  2/12/24 @ 1:30 pm        Patient was given the following to do until next session:  encourage self-care  4.  Anticipated Discharge: Anticipated Discharge Time: 1 - 3 Months     Procedure Code:  Psychotherapy (with patient) - 30  [CPT 17311]    MARIN FROST    Suicide Risk and Safety Concerns were assessed for. Patient meets the following risk assessment and triage: Patient has no change in safety concerns. Committed to safety and agreed to follow previously developed safety plan.  Reviewed safety plan with pt, pt agreed to follow, safety plan made available to pt, safety plan at the bottom of this note and in pts active mychart.  _____________________________________________________________________________________  Safety Plan      If I am feeling unsafe or I am in a crisis, I will:   -Contact my established care providers   -Call the National Suicide Prevention Lifeline: [phone]   -Go to the nearest emergency room   -Call 561     Warning signs that I or other people might notice when a crisis is developing for me:  increased \"big feelings\"  -Decreased appetite,    -Decreased sleep   -Restlessness   -Increased thoughts about wanting to die   -Increased irritability or agitation.      Things I am able to do on my own to cope or help me feel better:    -take a time-out. Practice yoga, listen to music, meditate, get a massage, or learn relaxation techniques. Stepping back from the problem helps clear your head.   -Eat well-balanced meals. Do not skip any meals. Do keep " healthful, energy-boosting snacks on hand.   -Limit alcohol and caffeine   -Get enough sleep. When stressed, your body needs additional sleep and rest.    -Exercise daily to help you feel good and maintain your health.   -Accept that you cannot control everything. Put your stress in perspective: Is it really as bad as you think?    -Welcome humor. A good laugh goes a long way.     Things that I am able to do with others to cope or help me better:    -Listen and talk about concerns   -Continue to follow with outpatient care providers and case management    -Go for a walk   -Distract with going out to eat, to a movie or a park.      Things I can use or do for distraction:    -Watch a TV show. If you don't have cable or a subscription service, many television networks offer free access, without a log-in, until you get closer to the most recent episodes.   -Watch a movie. Light-hearted comedy, drama to suck you in, or an old favorite - there are countless films to whisk you away for a bit.   -Sing. It doesn't matter if you're a professional vocalist or can't to carry a tune, singing engages a completely different part of your brain. Plus, the vibrations in your chest give great sensory feedback and the vocalization reminds you of your voice.   -Watch cute videos on e[employer]ast[employer]. About as low-effort as it gets: puppy/kassy videos, laugh challenges, or Vine compilations - take your pick.  -Mindless doodles/finger painting/playing with marcial. This may be especially helpful to those with child parts (DID/OSDD) who need an activity of their own.   -Grab a snack.   -Drum on a surface. Like singing, the vibrations and bilateral stimulation of your hands thumping will engage different parts of your mind and bring your attention away from what's intruding on you.   -Play a game or use a fun evan on your phone. Even if you aren't a , search the evan store. You might find one that speaks to you. It can be a great escape to get lost  in for a bit.   -Video games. Any console, any game!   -Tear out words/photos/etc for a collage. Ask a local doctor's office or hairdresser for their spare magazines. Mindlessly rip out photos and words that speak to you. (Bonus: you may get to put tabloids to good use for once! They often have the scathing, overdramatic words that happen to be great for a therapeutic collages. Shocking! Betrayal! You Won't Believe It!).   -Discover new music. [employer], [employer], -[place], so many ways to find new gems!   -Wash your face/hands or brush your teeth. A quick refresher can help you restart your day on a brand new page.   -Re-watch highlights from your favorite sport. It's easy to forget just how many epic, captivating moments there were once some time has passed. Relive your excitement. Plus, you already know how it ends, so you don't have to pay super close attention!   -Gratitude list. When your mind only wants to remind you of distressing things, focusing on 10+ things you're grateful for can really take you to a whole new atmosphere in your mind and heart.  -Imagery exercises. Containment exercises, healing pool/healing light, guided meditation, so many options!   -Play a board game with a friend. Something simple like Sorry!, challenging like chess, or silly like Cards Against Humanity, there are lots of options to distract you in the company of friends.   -Card games. Solo works, too, if there's no one around.     Changes I can make to support my mental health and wellness:     1. Value yourself: Treat yourself with kindness and respect, and avoid self-criticism. Make time for your hobbies and favorite projects, or broaden your horizons. Do a daily crossword puzzle, plant a garden, take dance lessons, learn to play an instrument or become fluent in another language.     2. Take care of your body: Taking care of yourself physically can improve your mental health. Be sure to:   -Eat nutritious meals   -Avoid smoking  and vaping-  -Drink plenty of water   -Exercise, which helps decrease depression and anxiety and improve moods   -Get enough sleep. Researchers believe that lack of sleep contributes to a high rate of depression in college students.      3. Surround yourself with good people: People with strong family or social connections are generally healthier than those who lack a support network. Make plans with supportive family members and friends, or seek out activities where you can meet new people, such as a club, class or support group.     4. Give yourself: Volunteer your time and energy to help someone else. You'll feel good about doing something tangible to help someone in need -- and it's a great way to meet new people. See Fun and Cheap Things to do in [place] for ideas.     5. Learn how to deal with stress: Like it or not, stress is a part of life. Practice good coping skills: Try One-Minute Stress Strategies, do Rehan Chi, exercise, take a nature walk, play with your pet or try journal writing as a stress reducer. Also, remember to smile and see the humor in life. Research shows that laughter can boost your immune system, ease pain, relax your body and reduce stress.     6. Quiet your mind: Try meditating, Mindfulness and/or prayer. Relaxation exercises and prayer can improve your state of mind and outlook on life. In fact, research shows that meditation may help you feel calm and enhance the effects of therapy. To get connected, see spiritual resources on Personal Well-being for Students     7. Set realistic goals: Decide what you want to achieve academically, professionally and personally, and write down the steps you need to realize your goals. Aim high, but be realistic and don't over-schedule. You'll enjoy a tremendous sense of accomplishment and self-worth as you progress toward your goal. Wellness Coaching, free to [employer] students, can help you develop goals and stay on track.      8. Break up the monotony:  "Although our routines make us more efficient and enhance our feelings of security and safety, a little change of pace can perk up a tedious schedule. Alter your jogging route, plan a road-trip, take a walk in a different park, hang some new pictures or try a new restaurant.     9. Avoid alcohol and other drugs: Keep alcohol use to a minimum and avoid other drugs. Sometimes people use alcohol and other drugs to \"self-medicate\" but in reality, alcohol and other drugs only aggravate problems.     10. Get help when you need it: Seeking help is a sign of strength -- not a weakness. And it is important to remember that treatment is effective. People who get appropriate care can recover from mental illness and addiction and lead full, rewarding lives.     People in my life that I can ask for help: parents, friends  -Spouse   -Friends   -Therapist    -Other Mental health Supportive Services     Your [facility] has a mental health crisis team you can call 24/7:    Phone: [facility] Crisis line:  [phone]    Other things that are important when I m in crisis for myself or others to do:     -Keep your voice calm   -Avoid overreacting   -Listen to the person   -Express support and concern   -Avoid continuous eye contact   -Ask how you can help   -Keep stimulation level low   -Move slowly   -Offer options instead of trying to take control   -Avoid touching the person unless you ask permission   -Be patient    -Gently announce actions before initiating them    -Give them space, don t make them feel  trapped   -Don t make judgmental comments   -Don t argue or try to reason with the person     Additional resources and information:      National [place] on Mental Illness (MARGOT) [place]: Connect for help, to navigate the mental health system, and for support and for resources. Call: 1-428-QCEU-Helps / 7-859-988-9989     Crisis Text Line: The 24/7 emergency service is available if you or someone you know is experiencing a " "psychiatric or mental health crisis. Text: \"[place]\" to 520905     [place] Warmline: Are you an adult needing support? Talk to a specialist who has firsthand experience living with a mental health condition. Call: 980.439.8771. Text: \"Support\" to 80023     National Suicide Prevention Lifeline: The 24/7 lifeline provides support when in distress, has prevention and crisis resources for you or your loved ones, and resources for professionals.  Call 0-024-971-FDXF (8307)     Peer Support Connection Warmlines: Znas-we-thve telephone support that s safe and supportive. Open 5 p.m. to 9 a.m. Call or text: 1-156.463.6192      COVID Cares Stress Phone Support Service. Any [place] experiencing stress can call 573-TPJN7IO (979-985-1311) for free telephone support from 9am to 9pm every day. The service is a collaboration with volunteers from the [place] Psychiatric Society, the [place] Psychological Association, the [facility] Psychologists, and [facility] [place]. The free service is also accessible at AdviceIQ where searchers can also find psychiatric and mental health services availability and real-time Substance Use Disorder Treatment program openings.     Mental Health Apps     My3  [email or web address]     VirtualHopeBox  [email or web address]         "

## 2024-02-12 ENCOUNTER — VIRTUAL VISIT (OUTPATIENT)
Dept: BEHAVIORAL HEALTH | Facility: CLINIC | Age: 34
End: 2024-02-12
Payer: COMMERCIAL

## 2024-02-12 ENCOUNTER — OFFICE VISIT (OUTPATIENT)
Dept: OBGYN | Facility: CLINIC | Age: 34
End: 2024-02-12
Payer: COMMERCIAL

## 2024-02-12 VITALS
DIASTOLIC BLOOD PRESSURE: 71 MMHG | SYSTOLIC BLOOD PRESSURE: 115 MMHG | HEART RATE: 84 BPM | BODY MASS INDEX: 26.9 KG/M2 | WEIGHT: 143.4 LBS | TEMPERATURE: 98.1 F

## 2024-02-12 DIAGNOSIS — Z12.4 SCREENING FOR MALIGNANT NEOPLASM OF CERVIX: ICD-10-CM

## 2024-02-12 DIAGNOSIS — F41.1 GENERALIZED ANXIETY DISORDER: Primary | ICD-10-CM

## 2024-02-12 DIAGNOSIS — M79.18 MYALGIA OF PELVIC FLOOR: Primary | ICD-10-CM

## 2024-02-12 PROCEDURE — 87624 HPV HI-RISK TYP POOLED RSLT: CPT | Performed by: OBSTETRICS & GYNECOLOGY

## 2024-02-12 PROCEDURE — G0145 SCR C/V CYTO,THINLAYER,RESCR: HCPCS | Performed by: OBSTETRICS & GYNECOLOGY

## 2024-02-12 PROCEDURE — 99203 OFFICE O/P NEW LOW 30 MIN: CPT | Performed by: OBSTETRICS & GYNECOLOGY

## 2024-02-12 ASSESSMENT — ANXIETY QUESTIONNAIRES
IF YOU CHECKED OFF ANY PROBLEMS ON THIS QUESTIONNAIRE, HOW DIFFICULT HAVE THESE PROBLEMS MADE IT FOR YOU TO DO YOUR WORK, TAKE CARE OF THINGS AT HOME, OR GET ALONG WITH OTHER PEOPLE: SOMEWHAT DIFFICULT
2. NOT BEING ABLE TO STOP OR CONTROL WORRYING: SEVERAL DAYS
7. FEELING AFRAID AS IF SOMETHING AWFUL MIGHT HAPPEN: SEVERAL DAYS
6. BECOMING EASILY ANNOYED OR IRRITABLE: SEVERAL DAYS
GAD7 TOTAL SCORE: 8
3. WORRYING TOO MUCH ABOUT DIFFERENT THINGS: SEVERAL DAYS
GAD7 TOTAL SCORE: 8
1. FEELING NERVOUS, ANXIOUS, OR ON EDGE: MORE THAN HALF THE DAYS
5. BEING SO RESTLESS THAT IT IS HARD TO SIT STILL: SEVERAL DAYS

## 2024-02-12 ASSESSMENT — PATIENT HEALTH QUESTIONNAIRE - PHQ9
SUM OF ALL RESPONSES TO PHQ QUESTIONS 1-9: 7
SUM OF ALL RESPONSES TO PHQ QUESTIONS 1-9: 7
5. POOR APPETITE OR OVEREATING: SEVERAL DAYS
10. IF YOU CHECKED OFF ANY PROBLEMS, HOW DIFFICULT HAVE THESE PROBLEMS MADE IT FOR YOU TO DO YOUR WORK, TAKE CARE OF THINGS AT HOME, OR GET ALONG WITH OTHER PEOPLE: SOMEWHAT DIFFICULT
SUM OF ALL RESPONSES TO PHQ QUESTIONS 1-9: 7

## 2024-02-12 ASSESSMENT — COLUMBIA-SUICIDE SEVERITY RATING SCALE - C-SSRS
6. HAVE YOU EVER DONE ANYTHING, STARTED TO DO ANYTHING, OR PREPARED TO DO ANYTHING TO END YOUR LIFE?: NO
TOTAL  NUMBER OF INTERRUPTED ATTEMPTS SINCE LAST CONTACT: NO
SUICIDE, SINCE LAST CONTACT: NO
2. HAVE YOU ACTUALLY HAD ANY THOUGHTS OF KILLING YOURSELF?: NO
ATTEMPT SINCE LAST CONTACT: NO
TOTAL  NUMBER OF ABORTED OR SELF INTERRUPTED ATTEMPTS SINCE LAST CONTACT: NO
1. SINCE LAST CONTACT, HAVE YOU WISHED YOU WERE DEAD OR WISHED YOU COULD GO TO SLEEP AND NOT WAKE UP?: NO

## 2024-02-12 NOTE — PROGRESS NOTES
"    [facility]      PATIENT'S NAME: Jose Lizama  PREFERRED NAME: Jose  PRONOUNS:       MRN: [id number]  : 1990  ADDRESS: [address] [place] [address] 2  [facility] [address]  ACCT. NUMBER:  [id number]  DATE OF SERVICE: 24  START TIME: 1:30 pm  END TIME: 2:15 pm  PREFERRED PHONE: [phone]  May we leave a program related message: Yes  EMERGENCY CONTACT: was not obtained pt declined .  SERVICE MODALITY:  Video Visit:      Provider verified identity through the following two step process.  Patient provided:  Patient  and Patient address    Telemedicine Visit: The patient's condition can be safely assessed and treated via synchronous audio and visual telemedicine encounter.      Reason for Telemedicine Visit: Patient convenience (e.g. access to timely appointments / distance to available provider)    Originating Site (Patient Location): Patient's home    Distant Site (Provider Location): [facility]    Consent:  The patient/guardian has verbally consented to: the potential risks and benefits of telemedicine (video visit) versus in person care; bill my insurance or make self-payment for services provided; and responsibility for payment of non-covered services.     Patient would like the video invitation sent by:  My Chart    Mode of Communication:  Video Conference via Amwell    Distant Location (Provider):  Off-site    As the provider I attest to compliance with applicable laws and regulations related to telemedicine.    UNIVERSAL ADULT Mental Health DIAGNOSTIC ASSESSMENT    Identifying Information:  Patient is a 34 year old, ; other individual.  Patient was referred for an assessment by self.  Patient attended the session alone.    Chief Complaint:   The reason for seeking services at this time is: \"Relstionship issues/family issues\".  The problem(s) began 24. Increasing " "anxiety and depression sx in the setting of situational stressors.    Jose Lizama reports high anxiety sx in association to her relationship.  She endorses some intermittent depression sx, however reports they are not the presenting problem.  Pt denies depression sx all day everyday.  Pt reports she does believe she may have ADHD sx as well, however reports she is not looking for a dx or tx at this point in time.  Pt denies SI, HI, AH/VH at this point in time.  Pt reports she is hopeful, looking forward to weekend.  Pt reports the following protective factors:  supportive family, willingness to engage in therapy, hope for the future, attached to family/friends, embedded in a protective network, demonstrates reliability to personal/professional life.  Pt reports she is looking for individual therapy, wanting to work on attachment styles.  Pt reports she is looking forward to \"starting my therapy journey\".    Patient has attempted to resolve these concerns in the past through therapy  .    Social/Family History:  Patient reported they grew up in [facility] [facility]  .  They were raised by biological parents  .  Parents  / .  Patient reported that their childhood was \"dysfunction\".  Patient described their current relationships with family of origin as \"stable and meaningful\".     The patient describes their cultural background as ; other.  Cultural influences and impact on patient's life structure, values, norms, and healthcare: None really other than I m not Evangelical and follow a more progressive way of thinking.  Contextual influences on patient's health include: Contextual Factors: Individual Factors mental health .    These factors will be addressed in the Preliminary Treatment plan. Patient identified their preferred language to be English. Patient reported they does not need the assistance of an  or other support involved in therapy.     Patient reported had no significant " "delays in developmental tasks.   Patient's highest education level was college graduate  .  Patient identified the following learning problems: none reported.  Modifications will not be used to assist communication in therapy.  Patient reports they are  able to understand written materials.    Patient reported the following relationship history .  Patient's current relationship status is has a partner or significant other for '4 and a half years\".   Patient identified their sexual orientation as heterosexual.  Patient reported having \"none\"  child(manolo). Patient identified partner; parents; siblings; pets; friends; co-worker as part of their support system.  Patient identified the quality of these relationships as consistent and some family inconsistent,  .      Patient's current living/housing situation involves staying in own home/apartment.  The immediate members of family and household include Dao Hinton,Partner  and they report that housing is stable.    Patient is currently employed fulltime.  Patient reports their finances are obtained through employment. Patient does identify finances as a current stressor.      Patient reported that they have not been involved with the legal system.    . Patient does not report being under probation/ parole/ jurisdiction. They are not under any current court jurisdiction. .    Patient's Strengths and Limitations:  Patient identified the following strengths or resources that will help them succeed in treatment: commitment to health and well being, insight, intelligence, motivation, sense of humor, and work ethic. Things that may interfere with the patient's success in treatment include: financial hardship.     Assessments:  The following assessments were completed by patient for this visit:  PHQ9:       4/15/2013    10:03 AM 7/31/2013    11:30 AM 4/16/2014     2:00 PM 1/29/2024     7:38 AM 2/5/2024    12:00 PM 2/12/2024    10:06 AM 2/12/2024    10:15 AM   PHQ-9 SCORE   PHQ-9 " Total Score 12 8 9       PHQ-9 Total Score MyChart    8 (Mild depression) 11 (Moderate depression)  7 (Mild depression)   PHQ-9 Total Score    8 11 7 7     GAD7:       4/16/2014     2:56 PM 2/5/2024    12:01 PM 2/12/2024    10:06 AM   YAZMIN-7 SCORE   Total Score 11     Total Score  8 (mild anxiety)    Total Score  8 8     CAGE-AID:       2/5/2024    12:19 PM   CAGE-AID Total Score   Total Score 4   Total Score MyChart 4 (A total score of 2 or greater is considered clinically significant)     PROMIS 10-Global Health (all questions and answers displayed):       2/5/2024    12:17 PM   PROMIS 10   In general, would you say your health is: Good   In general, would you say your quality of life is: Very good   In general, how would you rate your physical health? Fair   In general, how would you rate your mental health, including your mood and your ability to think? Good   In general, how would you rate your satisfaction with your social activities and relationships? Good   In general, please rate how well you carry out your usual social activities and roles Fair   To what extent are you able to carry out your everyday physical activities such as walking, climbing stairs, carrying groceries, or moving a chair? Completely   In the past 7 days, how often have you been bothered by emotional problems such as feeling anxious, depressed, or irritable? Often   In the past 7 days, how would you rate your fatigue on average? Moderate   In the past 7 days, how would you rate your pain on average, where 0 means no pain, and 10 means worst imaginable pain? 2   In general, would you say your health is: 3   In general, would you say your quality of life is: 4   In general, how would you rate your physical health? 2   In general, how would you rate your mental health, including your mood and your ability to think? 3   In general, how would you rate your satisfaction with your social activities and relationships? 3   In general, please rate  "how well you carry out your usual social activities and roles. (This includes activities at home, at work and in your community, and responsibilities as a parent, child, spouse, employee, friend, etc.) 2   To what extent are you able to carry out your everyday physical activities such as walking, climbing stairs, carrying groceries, or moving a chair? 5   In the past 7 days, how often have you been bothered by emotional problems such as feeling anxious, depressed, or irritable? 4   In the past 7 days, how would you rate your fatigue on average? 3   In the past 7 days, how would you rate your pain on average, where 0 means no pain, and 10 means worst imaginable pain? 2   Global Mental Health Score 12   Global Physical Health Score 14   PROMIS TOTAL - SUBSCORES 26     [place] Suicide Severity Rating Scale (Short Version)      2/12/2024     2:00 PM   [place] Suicide Severity Rating (Short Version)   1. Wish to be Dead (Since Last Contact) N   2. Non-Specific Active Suicidal Thoughts (Since Last Contact) N   Actual Attempt (Since Last Contact) N   Has subject engaged in non-suicidal self-injurious behavior? (Since Last Contact) N   Interrupted Attempts (Since Last Contact) N   Aborted or Self-Interrupted Attempt (Since Last Contact) N   Preparatory Acts or Behavior (Since Last Contact) N   Suicide (Since Last Contact) N   Calculated C-SSRS Risk Score (Since Last Contact) No Risk Indicated         Personal and Family Medical History:  Patient does report a family history of mental health concerns.  Patient reports family history includes Lung Cancer in her maternal grandmother..     Patient does report Mental Health Diagnosis and/or Treatment.  Patient Patient reported the following previous diagnoses which include(s): an Anxiety Disorder and Depression.  Patient reported symptoms began 1/2024\".   Patient has received mental health services in the past:  therapy and meds\" .  Psychiatric Hospitalizations: None.  Patient " denies a history of civil commitment.  Patient is not receiving other mental health services.  These include  transition therapy .       Patient has had a physical exam to rule out medical causes for current symptoms.  Date of last physical exam was within the past year. Client was encouraged to follow up with PCP if symptoms were to develop. The patient has a [facility] Primary Care Provider, who is named Adenike Bull.  Patient reports no current medical concerns.  Patient denies any issues with pain..   There are not significant appetite / nutritional concerns / weight changes.   Patient does not report a history of head injury / trauma / cognitive impairment.      Patient reports not taking any current medications    Medication Adherence:  Patient reports not taking. Pt reports she is not presecribed meds.      Patient Allergies:  per ehr  Allergies   Allergen Reactions    No Known Drug Allergy        Medical History:  per ehr  Past Medical History:   Diagnosis Date    Depressive disorder          Current Mental Status Exam:   Appearance:  Appropriate    Eye Contact:  Good   Psychomotor:  Normal       Gait / station:  no problem  Attitude / Demeanor: Cooperative  Interested Friendly Pleasant  Speech      Rate / Production: Normal/ Responsive      Volume:  Normal  volume      Language:  intact  Mood:   Normal  Affect:   Appropriate  Bright    Thought Content: Clear   Thought Process: Coherent  Logical       Associations: No loosening of associations  Insight:   Good   Judgment:  Intact   Orientation:  All  Attention/concentration: Good    Substance Use:   Patient did report a family history of substance use concerns; see medical history section for details.  Patient has not received chemical dependency treatment in the past.  Patient has not ever been to detox.      Patient is not currently receiving any chemical dependency treatment. Patient reported the following problems as a result of their substance use:    "none reported in dropdown box .    Patient denies using alcohol.  Patient denies using tobacco.  Patient reports using cannabis 1 times per day and smokes 1 at a time. Patient started using cannabis at age \"about 21\".  Patient reports last use was \"2/11/24\".  Patient reports heaviest use was as a teen.  Patient denies using caffeine.  Patient reports using/abusing the following substance(s). Patient reported no other substance use.     Substance Use: No symptoms    Based on the positive CAGE score and clinical interview there   are indications of cannabis, pt denies its impacting functioning and pt denies any treatment needs or interests at this poin tin time. .    Significant Losses / Trauma / Abuse / Neglect Issues:   Patient did not  serve in the .  There are indications or report of significant loss, trauma, abuse or neglect issues related to: are no indications and client denies any losses, trauma, abuse, or neglect concerns.  Concerns for possible neglect are not present.     Safety Assessment:   Patient denies current homicidal ideation and behaviors.  Patient denies current self-injurious ideation and behaviors.    Patient denied risk behaviors associated with substance use.   Patient denies any high risk behaviors associated with mental health symptoms.  Patient reports the following current concerns for their personal safety: None.  Patient reports there are not firearms in the house.           History of Safety Concerns:  Patient denied a history of homicidal ideation.     Patient denied a history of personal safety concerns.    Patient denied a history of assaultive behaviors.    Patient denied a history of sexual assault behaviors.     Patient denied a history of risk behaviors associated with substance use.  Patient denies any history of high risk behaviors associated with mental health symptoms.  Patient reports the following protective factors: forward or future oriented thinking; dedication " to family or friends; living with other people; access to a variety of clinical interventions and pets    Risk Plan:  See Recommendations for Safety and Risk Management Plan    Review of Symptoms per patient report:   Depression: Lack of interest, Difficulties concentrating, Ruminations, Irritability, Feeling sad, down, or depressed, and Frequent crying  Sandrita:  No Symptoms  Psychosis: No Symptoms  Anxiety: Excessive worry, Nervousness, Social anxiety, Ruminations, Poor concentration, and Irritability  Panic:  No symptoms  Post Traumatic Stress Disorder:  No Symptoms   Eating Disorder: No Symptoms  ADD / ADHD:  Inattentive, Difficulties listening, and Forgetful  Conduct Disorder: No symptoms  Autism Spectrum Disorder: Deficits in social communication and social interactions  Obsessive Compulsive Disorder: No Symptoms    Patient reports the following compulsive behaviors and treatment history:  pt denie all in dropdown .      Diagnostic Criteria:   Generalized Anxiety Disorder  A. Excessive anxiety and worry about a number of events or activities (such as work or school performance).   B. The person finds it difficult to control the worry.  C. Select 3 or more symptoms (required for diagnosis). Only one item is required in children.   - Restlessness or feeling keyed up or on edge.    - Being easily fatigued.    - Difficulty concentrating or mind going blank.    - Irritability.    - Muscle tension.   E. The anxiety, worry, or physical symptoms cause clinically significant distress or impairment in social, occupational, or other important areas of functioning.     Functional Status:  Patient reports the following functional impairments:  home life with daily task, organization, relationship(s), and self-care.     Nonprogrammatic care:  Patient is requesting basic services to address current mental health concerns.    Clinical Summary:  1. Psychosocial, Cultural and Contextual Factors: mental health  .  2. Principal  DSM5 Diagnoses  (Sustained by DSM5 Criteria Listed Above):   300.02 (F41.1) Generalized Anxiety Disorder.  3. Other Diagnoses that is relevant to services:   296.32 (F33.1) Major Depressive Disorder, Recurrent Episode, Moderate _ and With anxious distress, by hx.  4. Provisional Diagnosis:    5. Prognosis: Expect Improvement and Relieve Acute Symptoms.  6. Likely consequences of symptoms if not treated: possible continuation of sx.  7. Client strengths include:  caring, creative, educated, empathetic, employed, goal-focused, good listener, has a previous history of therapy, insightful, intelligent, motivated, open to learning, open to suggestions / feedback, support of family, friends and providers, supportive, wants to learn, willing to ask questions, willing to relate to others, and work history .     Recommendations:     1. Plan for Safety and Risk Management:   Safety and Risk: Recommended that patient call 911 or go to the local ED should there be a change in any of these risk factors..          Report to child / adult protection services was NA.     2. Patient's identified  none reported in dropdown box .     3. Initial Treatment will focus on:    Anxiety - relationship stressors .     4. Resources/Service Plan:    services are not indicated.   Modifications to assist communication are not indicated.   Additional disability accommodations are not indicated.      5. Collaboration:   Collaboration / coordination of treatment will be initiated with the following  support professionals:  N/A .      6.  Referrals:   The following referral(s) will be initiated:  Individual therapy.  [facility] appt for 5/20/24 .       A Release of Information has been obtained for the following:  N/A .     Clinical Substantiation/medical necessity for the above recommendations:  Pt reports increased anxiety and depression sxs in the setting of situational stressors..    7. BRIAN:    BRIAN:  Discussed the general effects of drugs and  "alcohol on health and well-being. Pt denies use impacting functioning, states not looking to address.  8. Records:   These were reviewed at time of assessment.   Information in this assessment was obtained from the medical record and  provided by patient who is a good historian.    Patient will have open access to their mental health medical record.    9.   Interactive Complexity: No    10. Safety Plan:  Patient has no change in safety concerns. Committed to safety and agreed to follow previously developed safety plan.    Provider Name/ Credentials:  Gaby Mantilla Psychotherapist Trainee  February 12, 2024  ____________________________________________________________________________________________________________________________    Suicide Risk and Safety Concerns were assessed for. Patient meets the following risk assessment and triage: Patient has no change in safety concerns. Committed to safety and agreed to follow previously developed safety plan.  Reviewed safety plan with pt, pt agreed to follow, safety plan made available to pt, safety plan at the bottom of this note and in pts active mychart.    Safety Plan       If I am feeling unsafe or I am in a crisis, I will:   -Contact my established care providers   -Call the National Suicide Prevention Lifeline: [phone]   -Go to the nearest emergency room   -Call 911      Warning signs that I or other people might notice when a crisis is developing for me:  increased \"big feelings\"  -Decreased appetite,    -Decreased sleep   -Restlessness   -Increased thoughts about wanting to die   -Increased irritability or agitation.       Things I am able to do on my own to cope or help me feel better:    -take a time-out. Practice yoga, listen to music, meditate, get a massage, or learn relaxation techniques. Stepping back from the problem helps clear your head.   -Eat well-balanced meals. Do not skip any meals. Do keep healthful, energy-boosting snacks on hand. "   -Limit alcohol and caffeine   -Get enough sleep. When stressed, your body needs additional sleep and rest.    -Exercise daily to help you feel good and maintain your health.   -Accept that you cannot control everything. Put your stress in perspective: Is it really as bad as you think?    -Welcome humor. A good laugh goes a long way.      Things that I am able to do with others to cope or help me better:    -Listen and talk about concerns   -Continue to follow with outpatient care providers and case management    -Go for a walk   -Distract with going out to eat, to a movie or a park.       Things I can use or do for distraction:    -Watch a TV show. If you don't have cable or a subscription service, many television networks offer free access, without a log-in, until you get closer to the most recent episodes.   -Watch a movie. Light-hearted comedy, drama to suck you in, or an old favorite - there are countless films to whisk you away for a bit.   -Sing. It doesn't matter if you're a professional vocalist or can't to carry a tune, singing engages a completely different part of your brain. Plus, the vibrations in your chest give great sensory feedback and the vocalization reminds you of your voice.   -Watch cute videos on [employer]. About as low-effort as it gets: puppy/kassy videos, laugh challenges, or Vine compilations - take your pick.  -Mindless doodles/finger painting/playing with marcial. This may be especially helpful to those with child parts (DID/OSDD) who need an activity of their own.   -Grab a snack.   -Drum on a surface. Like singing, the vibrations and bilateral stimulation of your hands thumping will engage different parts of your mind and bring your attention away from what's intruding on you.   -Play a game or use a fun evan on your phone. Even if you aren't a , search the evan store. You might find one that speaks to you. It can be a great escape to get lost in for a bit.   -Video games. Any console,  any game!   -Tear out words/photos/etc for a collage. Ask a local doctor's office or hairdresser for their spare magazines. Mindlessly rip out photos and words that speak to you. (Bonus: you may get to put tabloids to good use for once! They often have the scathing, overdramatic words that happen to be great for a therapeutic collages. Shocking! Betrayal! You Won't Believe It!).   -Discover new music. [employer], [employer], -[employer], so many ways to find new gems!   -Wash your face/hands or brush your teeth. A quick refresher can help you restart your day on a brand new page.   -Re-watch highlights from your favorite sport. It's easy to forget just how many epic, captivating moments there were once some time has passed. Relive your excitement. Plus, you already know how it ends, so you don't have to pay super close attention!   -Gratitude list. When your mind only wants to remind you of distressing things, focusing on 10+ things you're grateful for can really take you to a whole new atmosphere in your mind and heart.  -Imagery exercises. Containment exercises, healing pool/healing light, guided meditation, so many options!   -Play a board game with a friend. Something simple like Sorry!, challenging like chess, or silly like Cards Against Humanity, there are lots of options to distract you in the company of friends.   -Card games. Solo works, too, if there's no one around.      Changes I can make to support my mental health and wellness:      1. Value yourself: Treat yourself with kindness and respect, and avoid self-criticism. Make time for your hobbies and favorite projects, or broaden your horizons. Do a daily crossword puzzle, plant a garden, take dance lessons, learn to play an instrument or become fluent in another language.      2. Take care of your body: Taking care of yourself physically can improve your mental health. Be sure to:   -Eat nutritious meals   -Avoid smoking and vaping-  -Drink plenty of water    -Exercise, which helps decrease depression and anxiety and improve moods   -Get enough sleep. Researchers believe that lack of sleep contributes to a high rate of depression in college students.       3. Surround yourself with good people: People with strong family or social connections are generally healthier than those who lack a support network. Make plans with supportive family members and friends, or seek out activities where you can meet new people, such as a club, class or support group.      4. Give yourself: Volunteer your time and energy to help someone else. You'll feel good about doing something tangible to help someone in need -- and it's a great way to meet new people. See Fun and Cheap Things to do in [place] for ideas.      5. Learn how to deal with stress: Like it or not, stress is a part of life. Practice good coping skills: Try One-Minute Stress Strategies, do Rehan Chi, exercise, take a nature walk, play with your pet or try journal writing as a stress reducer. Also, remember to smile and see the humor in life. Research shows that laughter can boost your immune system, ease pain, relax your body and reduce stress.      6. Quiet your mind: Try meditating, Mindfulness and/or prayer. Relaxation exercises and prayer can improve your state of mind and outlook on life. In fact, research shows that meditation may help you feel calm and enhance the effects of therapy. To get connected, see spiritual resources on Personal Well-being for Students      7. Set realistic goals: Decide what you want to achieve academically, professionally and personally, and write down the steps you need to realize your goals. Aim high, but be realistic and don't over-schedule. You'll enjoy a tremendous sense of accomplishment and self-worth as you progress toward your goal. Wellness Coaching, free to [employer] students, can help you develop goals and stay on track.       8. Break up the monotony: Although our routines make us  "more efficient and enhance our feelings of security and safety, a little change of pace can perk up a tedious schedule. Alter your jogging route, plan a road-trip, take a walk in a different park, hang some new pictures or try a new restaurant.      9. Avoid alcohol and other drugs: Keep alcohol use to a minimum and avoid other drugs. Sometimes people use alcohol and other drugs to \"self-medicate\" but in reality, alcohol and other drugs only aggravate problems.      10. Get help when you need it: Seeking help is a sign of strength -- not a weakness. And it is important to remember that treatment is effective. People who get appropriate care can recover from mental illness and addiction and lead full, rewarding lives.      People in my life that I can ask for help: parents, friends  -Spouse   -Friends   -Therapist    -Other Mental health Supportive Services      Your [facility] has a mental health crisis team you can call 24/7:    Phone: [facility] Crisis line:  [phone]     Other things that are important when I m in crisis for myself or others to do:     -Keep your voice calm   -Avoid overreacting   -Listen to the person   -Express support and concern   -Avoid continuous eye contact   -Ask how you can help   -Keep stimulation level low   -Move slowly   -Offer options instead of trying to take control   -Avoid touching the person unless you ask permission   -Be patient    -Gently announce actions before initiating them    -Give them space, don t make them feel  trapped   -Don t make judgmental comments   -Don t argue or try to reason with the person      Additional resources and information:       National [place] [employer]ness (MARGOT) [place]: Connect for help, to navigate the mental health system, and for support and for resources. Call: 3-461-VTXM-Helps / 0-634-283-9139      Crisis Text Line: The 24/7 emergency service is available if you or someone you know is experiencing a psychiatric or mental " "health crisis. Text: \"[place]\" to 871057      [place] Warmline: Are you an adult needing support? Talk to a specialist who has firsthand experience living with a mental health condition. Call: 135.712.8373. Text: \"Support\" to 20331      National Suicide Prevention Lifeline: The 24/7 lifeline provides support when in distress, has prevention and crisis resources for you or your loved ones, and resources for professionals.  Call 5-716-056-UFPQ (3951)      Peer Support Connection Warmlines: Pyjt-kp-nxbj telephone support that s safe and supportive. Open 5 p.m. to 9 a.m. Call or text: 1-889.858.5519       COVID Cares Stress Phone Support Service. Any [place] experiencing stress can call 182-XGHS5QW (170-269-8798) for free telephone support from 9am to 9pm every day. The service is a collaboration with volunteers from the [place] [employer], the [place] [employer], the [place] [employer], and [facility] [place]. The free service is also accessible at Unruly Â® where searchers can also find psychiatric and mental health services availability and real-time Substance Use Disorder Treatment program openings.      Mental Health Apps      My3  [email or web address]      VirtualHopeBox  [email or web address]        "

## 2024-02-12 NOTE — PROGRESS NOTES
"  Assessment & Plan     Myalgia of pelvic floor  Reviewed exam findings.   Discussed pelvic floor myalgia. Discussed etiology, natural history, treatment.   Recommended and ordered pelvic floor PT.   Return to clinic in 3 months      Screening for malignant neoplasm of cervix    - Pap imaged thin layer screen with HPV - recommended age 30 - 65 years (select HPV order below)  - HPV Hold (Lab Only)  - HPV High Risk Types DNA Cervical    Ordering of each unique test        BMI  Estimated body mass index is 26.9 kg/m  as calculated from the following:    Height as of 1/29/24: 1.555 m (5' 1.22\").    Weight as of this encounter: 65 kg (143 lb 6.4 oz).             No follow-ups on file.    Vita Garcia is a 34 year old, presenting for the following health issues:  Follow Up (Discuss Experiencing Pain during Sexual [place] X 1 Year and Pelvic Floor Therapy/No Acute Concerns.)    RYLEE   Presents for evaluation of pain with sex.   Has had the same sexual partner for 4-5 years.   Has been having pain with sex for about 1.5 years. Lube kind of helps, but feels like it's a muscular blockage. Feels like putting something too big into something too small.   Uses vaginal ring for contraception.  Noticed during sleep that she's holding her legs with a lot of tension. When she pays attention she can release tension.  Wondering if she's holding a lot of tension in her pelvic floor as well.   Has urinary frequency as well. Saw a TikTok and started to put all of these symptoms together.     Past Medical History:   Diagnosis Date    Depressive disorder        No past surgical history on file.    Family History   Problem Relation Age of Onset    Lung Cancer Maternal Grandmother        Social History     Socioeconomic History    Marital status: Single     Spouse name: Not on file    Number of children: Not on file    Years of education: Not on file    Highest education level: Not on file   Occupational History    Not on file "   Tobacco Use    Smoking status: Former     Packs/day: 1.00     Years: 10.00     Additional pack years: 0.00     Total pack years: 10.00     Types: Cigarettes    Smokeless tobacco: Never   Vaping Use    Vaping Use: Some days   Substance and Sexual Activity    Alcohol use: Not Currently     Comment: Last ETOH was Saturday.    Drug use: Yes     Frequency: 7.0 times per week     Types: Marijuana    Sexual activity: Yes     Partners: Male   Other Topics Concern    Parent/sibling w/ CABG, MI or angioplasty before 65F 55M? No   Social History Narrative    Not on file     Social Determinants of Health     Financial Resource Strain: Low Risk  (1/29/2024)    Financial Resource Strain     Within the past 12 months, have you or your family members you live with been unable to get utilities (heat, electricity) when it was really needed?: No   Food Insecurity: Low Risk  (1/29/2024)    Food Insecurity     Within the past 12 months, did you worry that your food would run out before you got money to buy more?: No     Within the past 12 months, did the food you bought just not last and you didn t have money to get more?: No   Transportation Needs: Low Risk  (1/29/2024)    Transportation Needs     Within the past 12 months, has lack of transportation kept you from medical appointments, getting your medicines, non-medical meetings or appointments, work, or from getting things that you need?: No   Physical Activity: Not on file   Stress: Not on file   Social Connections: Not on file   Interpersonal Safety: Low Risk  (1/29/2024)    Interpersonal Safety     Do you feel physically and emotionally safe where you currently live?: Yes     Within the past 12 months, have you been hit, slapped, kicked or otherwise physically hurt by someone?: No     Within the past 12 months, have you been humiliated or emotionally abused in other ways by your partner or ex-partner?: No   Housing Stability: Low Risk  (1/29/2024)    Housing Stability     Do you  have housing? : Yes     Are you worried about losing your housing?: No       Current Outpatient Medications   Medication    albuterol (PROAIR HFA/PROVENTIL HFA/VENTOLIN HFA) 108 (90 Base) MCG/ACT inhaler    cyclobenzaprine (FLEXERIL) 10 MG tablet    HALOETTE 0.12-0.015 MG/24HR vaginal ring     No current facility-administered medications for this visit.          Allergies   Allergen Reactions    No Known Drug Allergy            Review of Systems  Constitutional, HEENT, cardiovascular, pulmonary, gi and gu systems are negative, except as otherwise noted.      Objective    /71   Pulse 84   Temp 98.1  F (36.7  C) (Oral)   Wt 65 kg (143 lb 6.4 oz)   LMP 01/16/2024   Breastfeeding No   BMI 26.90 kg/m    Body mass index is 26.9 kg/m .  Physical Exam   GENERAL: alert and no distress  ABDOMEN: soft, nontender, no hepatosplenomegaly, no masses and bowel sounds normal   (female) w/bimanual: normal female external genitalia, normal urethral meatus, normal vaginal mucosa, normal cervix/adnexa/uterus without masses or discharge  MS: levator ani tension and tenderness, obturator internus tension and tenderness  PSYCH: mentation appears normal, affect normal/bright            Signed Electronically by: Roya Barboza MD

## 2024-02-16 LAB
BKR LAB AP GYN ADEQUACY: NORMAL
BKR LAB AP GYN INTERPRETATION: NORMAL
BKR LAB AP HPV REFLEX: NORMAL
BKR LAB AP LMP: NORMAL
BKR LAB AP PREVIOUS ABNORMAL: NORMAL
PATH REPORT.COMMENTS IMP SPEC: NORMAL
PATH REPORT.COMMENTS IMP SPEC: NORMAL
PATH REPORT.RELEVANT HX SPEC: NORMAL

## 2024-02-22 LAB
HUMAN PAPILLOMA VIRUS 16 DNA: NEGATIVE
HUMAN PAPILLOMA VIRUS 18 DNA: NEGATIVE
HUMAN PAPILLOMA VIRUS FINAL DIAGNOSIS: NORMAL
HUMAN PAPILLOMA VIRUS OTHER HR: NEGATIVE

## 2024-02-25 NOTE — PROGRESS NOTES
Transition Clinic Progress Note    Patient Name:   Jose Lizama Date: 2024          Service Type: Individual        VISIT TIME START: 830      VISIT TIME END: 910      Session Length:  TC Session Length: 45 (38-52 Minutes)    Session #:  3    Attendees: TC Attendees: Client alone    Service Modality:  Service Modality: Video Visit:      Provider verified identity through the following two step process.  Patient provided:  Patient  and Patient address    Telemedicine Visit: The patient's condition can be safely assessed and treated via synchronous audio and visual telemedicine encounter.      Reason for Telemedicine Visit: Patient convenience (e.g. access to timely appointments / distance to available provider)    Originating Site (Patient Location): Patient's home    Distant Site (Provider Location): [facility]    Consent:  The patient/guardian has verbally consented to: the potential risks and benefits of telemedicine (video visit) versus in person care; bill my insurance or make self-payment for services provided; and responsibility for payment of non-covered services.     Patient would like the video invitation sent by:  My Chart    Mode of Communication:  Video Conference via [facility]    Distant Location (Provider):  Off-site    As the provider I attest to compliance with applicable laws and regulations related to telemedicine.    Informed Consent and Assessment Methods    This provider and patient discussed HIPAA, and the limits of confidentiality; including mandated reporting, the possibility of collaborative discussions with patient's primary care provider and the multidisciplinary team in the MH clinic during consultation.  Discussed the no show policy, and the benefits and possible unintended consequences of therapy.  Patient indicated understanding Transition Clinic services are short term, solution focused, until a referral can  "be made and patient can bridge to long term therapy.  Patient verbally indicated understanding the informed consent.        DATA  Interactive Complexity: No  Crisis: No        Progress Since Last Session (Related to Symptoms / Goals / Homework):   Symptoms: Improving Pt reports improved sxs today.  Homework: Completed in session      Episode of Care Goals: Satisfactory progress - ACTION (Actively working towards change); Intervened by reinforcing change plan / affirming steps taken     Current / Ongoing Stressors and Concerns:   Pt reports improved sxs today.  Pt denies depression sxs.  Pt denies SI, HI, AH/VH.  Pt reports she is hopeful, looking forward to the weekend.  Pt reports the following protective factors: supportive family, willingness to engage in therapy, hope for the future, attached to family/friends, embedded in a protective network, demonstrates reliability to personal/professional life.  \  Pt and writer developed pt treatment plan today, please see below.    Pt processes largely much of session today regarding relationship and dynamics.  Pt is insightful and motivated.  Pt talks about big feelings and a resource on her fridge.  Pt reports her [facility] appt got moved to July 2024.  Pt talks about previous hurtful feelings in relationship that arise.  Pt talks about \"having loved,lost and loved again\".  Validated pt, empathized.  Pt and writer explored CBT triangle and cognitive distortions as well as LOCUS of control.  Pt appeared engaged and receptive to the above interventions.      Plan :  3/4/24 @ 8:30 am            Treatment Objective(s) Addressed in This Session:   Encourage use distraction each time intrusive worry surfaces  Encourage self-care  Encourage coping skill log use     Intervention:      Brief Psychotherapy - discussed and prioritizing the most efficient treatment., Cognitive Behavioral Therapy (CBT) - Discussed changing thoughts is the path to changing behaviors and feelings., " Mindfulness Therapy - Cultivation of present-oriented, non-judgmental attitude. It helps nurtures great awareness, clarity, and acceptance of reality., Motivational Interviewing - helping to find the motivation to make positive behavior changes., and Person-Centered Therapy - Actualizes potential and moves towards increased awareness, spontaneity, trust in self and inner directedness.    Assessments completed prior to visit:  The following assessments were completed by patient for this visit:      [place] Suicide Severity Rating Scale (Short Version)      2/12/2024     2:00 PM 2/26/2024     9:00 AM   [place] Suicide Severity Rating (Short Version)   1. Wish to be Dead (Since Last Contact) N N   2. Non-Specific Active Suicidal Thoughts (Since Last Contact) N N   Actual Attempt (Since Last Contact) N N   Has subject engaged in non-suicidal self-injurious behavior? (Since Last Contact) N N   Interrupted Attempts (Since Last Contact) N N   Aborted or Self-Interrupted Attempt (Since Last Contact) N N   Preparatory Acts or Behavior (Since Last Contact) N N   Suicide (Since Last Contact) N N   Calculated C-SSRS Risk Score (Since Last Contact) No Risk Indicated No Risk Indicated          ASSESSMENT: Current Emotional / Mental Status (status of significant symptoms):   Risk status (Self / Other harm or suicidal ideation)   Patient denies current fears or concerns for personal safety.   Patient denies current or recent suicidal ideation or behaviors.   Patient denies current or recent homicidal ideation or behaviors.   Patient denies current or recent self injurious behavior or ideation.   Patient denies other safety concerns.   Patient reports there has been no change in risk factors since their last session.     Patient reports there has been no change in protective factors since their last session.     Recommended that patient call 911 or go to the local ED should there be a change in any of these risk factors..  Patient  has no change in safety concerns. Committed to safety and agreed to follow previously developed safety plan.  Reviewed safety plan with pt, pt agreed to follow, safety plan made available to pt, safety plan at the bottom of this note and in pts active mychart.     Appearance:   Appropriate    Eye Contact:   Good    Psychomotor Behavior: Normal    Attitude:   Cooperative  Friendly Pleasant   Orientation:   All   Speech    Rate / Production: Normal     Volume:  Normal    Mood:    Normal   Affect:    Appropriate    Thought Content:  Clear    Thought Form:  Coherent  Logical    Insight:    Good      Medication Review:   No current psychiatric medications prescribed     Medication Compliance:   NA     Changes in Health Issues:   None reported     Chemical Use Review:   Substance Use: Chemical use reviewed, no active concerns identified      Tobacco Use: No current tobacco use.      Diagnoses:   300.02 (F41.1) Generalized Anxiety Disorder    Collateral Reports Completed:    Collateral: [facility] reviewed.    PLAN: (Patient Tasks / Therapist Tasks / Other)  Encourage use of coping skills including mindfulness, meditation at least once between sessions  Encourage self-care  Encourage coping skill log use    Procedure Code: Psychotherapy (with patient) - 45 [CPT 71597]    MARIN FROST                                                         ______________________________________________________________________    Treatment Plan    Patient's Name: Jose Lizama  YOB: 1990  Date of Creation: 2/26/24  Date Treatment Plan Last Reviewed/Revised: Initial    DSM5 Diagnoses: 300.02 (F41.1) Generalized Anxiety Disorder  Psychosocial / Contextual Factors: Pt with reported situational stressors.  PROMIS (reviewed every 90 days):     Referral / Collaboration:  N/A .    Anticipated number of session for this episode of care: 6-9 sessions  Anticipation frequency of session:  Weekly  Anticipated Duration of each session: 38-52 minutes  Treatment plan will be reviewed in 90 days or when goals have been changed.       MeasurableTreatment Goal(s) related to diagnosis / functional impairment(s)  Goal 1: Patient will reduce overall frequency, intensity, and duration of the anxiety so that daily functioning is not impaired.      Objective #A        Patient will describe situations, thoughts, feelings, and actions associated with anxieties and worries, their impact on functioning, and attempts to resolve them.     Intervention(s)     LMHP will focus on developing a level of trust with the patient; provide support and empathy to encourage the client to feel safe in expressing their symptoms.      LMHP will ask the patient to describe their past experiences of anxiety and their impact on functioning: assess the focus, excessiveness, and uncontrollability of the worry and the type, frequency, intensity, and duration of their anxiety symptoms.      Goal 2: Patient will learn and implement coping skills that result in a reduction of anxiety and worry and improved daily functioning.      Objective A        Patient will learn and implement calming skills to reduce overall anxiety and manage anxiety symptoms     Objective B     Patient will verbalize understanding of the role that cognitive biases play in excessive irrational worry and persistent anxiety symptoms.      Intervention(s)     LMHP will teach the patient calming/relaxation skills and how to differentiate between relaxation and tension while teaching the patient how to implement these strategies in their daily life.      LMHP will assist the patient in analyzing their worries by examining cognitive distortions and the patient s ability to control their emotions surrounding cognitive distortions..      Patient has reviewed and agreed to the above plan.      MARIN FROST  February 25,  "2024    ___________________________________________________________________________________________________________________________  Patient has no change in safety concerns. Committed to safety and agreed to follow previously developed safety plan.  Reviewed safety plan with pt, pt agreed to follow, safety plan made available to pt, safety plan at the bottom of this note and in pts active mychart.    Safety Plan       If I am feeling unsafe or I am in a crisis, I will:   -Contact my established care providers   -Call the National Suicide Prevention Lifeline: 365.698.1853   -Go to the nearest emergency room   -Call [phone]      Warning signs that I or other people might notice when a crisis is developing for me:  increased \"big feelings\"  -Decreased appetite,    -Decreased sleep   -Restlessness   -Increased thoughts about wanting to die   -Increased irritability or agitation.       Things I am able to do on my own to cope or help me feel better:    -take a time-out. Practice yoga, listen to music, meditate, get a massage, or learn relaxation techniques. Stepping back from the problem helps clear your head.   -Eat well-balanced meals. Do not skip any meals. Do keep healthful, energy-boosting snacks on hand.   -Limit alcohol and caffeine   -Get enough sleep. When stressed, your body needs additional sleep and rest.    -Exercise daily to help you feel good and maintain your health.   -Accept that you cannot control everything. Put your stress in perspective: Is it really as bad as you think?    -Welcome humor. A good laugh goes a long way.      Things that I am able to do with others to cope or help me better:    -Listen and talk about concerns   -Continue to follow with outpatient care providers and case management    -Go for a walk   -Distract with going out to eat, to a movie or a park.       Things I can use or do for distraction:    -Watch a TV show. If you don't have cable or a subscription service, many television " networks offer free access, without a log-in, until you get closer to the most recent episodes.   -Watch a movie. Light-hearted comedy, drama to suck you in, or an old favorite - there are countless films to whisk you away for a bit.   -Sing. It doesn't matter if you're a professional vocalist or can't to carry a tune, singing engages a completely different part of your brain. Plus, the vibrations in your chest give great sensory feedback and the vocalization reminds you of your voice.   -Watch cute videos on [employer]. About as low-effort as it gets: puppy/kassy videos, laugh challenges, or Vine compilations - take your pick.  -Mindless doodles/finger painting/playing with marcial. This may be especially helpful to those with child parts (DID/OSDD) who need an activity of their own.   -Grab a snack.   -Drum on a surface. Like singing, the vibrations and bilateral stimulation of your hands thumping will engage different parts of your mind and bring your attention away from what's intruding on you.   -Play a game or use a fun evan on your phone. Even if you aren't a , search the evan store. You might find one that speaks to you. It can be a great escape to get lost in for a bit.   -Video games. Any console, any game!   -Tear out words/photos/etc for a collage. Ask a local doctor's office or hairdresser for their spare magazines. Mindlessly rip out photos and words that speak to you. (Bonus: you may get to put tabloids to good use for once! They often have the scathing, overdramatic words that happen to be great for a therapeutic collages. Shocking! Betrayal! You Won't Believe It!).   -Discover new music. Anturis, Etherpad, -Soledad, so many ways to find new gems!   -Wash your face/hands or brush your teeth. A quick refresher can help you restart your day on a brand new page.   -Re-watch highlights from your favorite sport. It's easy to forget just how many epic, captivating moments there were once some time has passed.  Relive your excitement. Plus, you already know how it ends, so you don't have to pay super close attention!   -Gratitude list. When your mind only wants to remind you of distressing things, focusing on 10+ things you're grateful for can really take you to a whole new atmosphere in your mind and heart.  -Imagery exercises. Containment exercises, healing pool/healing light, guided meditation, so many options!   -Play a board game with a friend. Something simple like Sorry!, challenging like chess, or silly like Cards Against Humanity, there are lots of options to distract you in the company of friends.   -Card games. Solo works, too, if there's no one around.      Changes I can make to support my mental health and wellness:      1. Value yourself: Treat yourself with kindness and respect, and avoid self-criticism. Make time for your hobbies and favorite projects, or broaden your horizons. Do a daily crossword puzzle, plant a garden, take dance lessons, learn to play an instrument or become fluent in another language.      2. Take care of your body: Taking care of yourself physically can improve your mental health. Be sure to:   -Eat nutritious meals   -Avoid smoking and vaping-  -Drink plenty of water   -Exercise, which helps decrease depression and anxiety and improve moods   -Get enough sleep. Researchers believe that lack of sleep contributes to a high rate of depression in college students.       3. Surround yourself with good people: People with strong family or social connections are generally healthier than those who lack a support network. Make plans with supportive family members and friends, or seek out activities where you can meet new people, such as a club, class or support group.      4. Give yourself: Volunteer your time and energy to help someone else. You'll feel good about doing something tangible to help someone in need -- and it's a great way to meet new people. See Fun and Cheap Things to do in  "Zenaida Lerma for ideas.      5. Learn how to deal with stress: Like it or not, stress is a part of life. Practice good coping skills: Try One-Minute Stress Strategies, do Rehan Chi, exercise, take a nature walk, play with your pet or try journal writing as a stress reducer. Also, remember to smile and see the humor in life. Research shows that laughter can boost your immune system, ease pain, relax your body and reduce stress.      6. Quiet your mind: Try meditating, Mindfulness and/or prayer. Relaxation exercises and prayer can improve your state of mind and outlook on life. In fact, research shows that meditation may help you feel calm and enhance the effects of therapy. To get connected, see spiritual resources on Personal Well-being for Students      7. Set realistic goals: Decide what you want to achieve academically, professionally and personally, and write down the steps you need to realize your goals. Aim high, but be realistic and don't over-schedule. You'll enjoy a tremendous sense of accomplishment and self-worth as you progress toward your goal. Wellness Coaching, free to -[employer] students, can help you develop goals and stay on track.       8. Break up the monotony: Although our routines make us more efficient and enhance our feelings of security and safety, a little change of pace can perk up a tedious schedule. Alter your jogging route, plan a road-trip, take a walk in a different park, hang some new pictures or try a new restaurant.      9. Avoid alcohol and other drugs: Keep alcohol use to a minimum and avoid other drugs. Sometimes people use alcohol and other drugs to \"self-medicate\" but in reality, alcohol and other drugs only aggravate problems.      10. Get help when you need it: Seeking help is a sign of strength -- not a weakness. And it is important to remember that treatment is effective. People who get appropriate care can recover from mental illness and addiction and lead full, rewarding lives.    " "  People in my life that I can ask for help: parents, friends  -Spouse   -Friends   -Therapist    -Other Mental health Supportive Services      Your county has a mental health crisis team you can call 24/7:    Phone: [facility] Crisis line:  [phone]     Other things that are important when I m in crisis for myself or others to do:     -Keep your voice calm   -Avoid overreacting   -Listen to the person   -Express support and concern   -Avoid continuous eye contact   -Ask how you can help   -Keep stimulation level low   -Move slowly   -Offer options instead of trying to take control   -Avoid touching the person unless you ask permission   -Be patient    -Gently announce actions before initiating them    -Give them space, don t make them feel  trapped   -Don t make judgmental comments   -Don t argue or try to reason with the person      Additional resources and information:       National [employer] on Mental Illness (MARGOT) [place]: Connect for help, to navigate the mental health system, and for support and for resources. Call: 3-756-OVVW-Helps / 3-685-398-4401      Crisis Text Line: The 24/7 emergency service is available if you or someone you know is experiencing a psychiatric or mental health crisis. Text: \"[place]\" to 883856      [place] Warmline: Are you an adult needing support? Talk to a specialist who has firsthand experience living with a mental health condition. Call: 921.830.9732. Text: \"Support\" to 32492      National Suicide Prevention Lifeline: The 24/7 lifeline provides support when in distress, has prevention and crisis resources for you or your loved ones, and resources for professionals.  Call 0-990-696-TALK (5360)      Peer Support Connection Warmlines: Xwkm-wo-ntop telephone support that s safe and supportive. Open 5 p.m. to 9 a.m. Call or text: 1-751.336.5432       COVID Cares Stress Phone Support Service. Any [place] experiencing stress can call 976-CCBH1MN (217-065-5435) for free " telephone support from 9am to 9pm every day. The service is a collaboration with volunteers from the [place] Psychiatric Society, the [place] Psychological Association, the [facility] Psychologists, and [facility] [place]. The free service is also accessible at [employer] where searchers can also find psychiatric and mental health services availability and real-time Substance Use Disorder Treatment program openings.      Mental Health Apps      My3  [email or web address]      VirtualHopeBox  [email or web address]

## 2024-02-26 ENCOUNTER — VIRTUAL VISIT (OUTPATIENT)
Dept: BEHAVIORAL HEALTH | Facility: CLINIC | Age: 34
End: 2024-02-26
Payer: COMMERCIAL

## 2024-02-26 DIAGNOSIS — F41.1 GENERALIZED ANXIETY DISORDER: Primary | ICD-10-CM

## 2024-02-26 ASSESSMENT — COLUMBIA-SUICIDE SEVERITY RATING SCALE - C-SSRS
TOTAL  NUMBER OF INTERRUPTED ATTEMPTS SINCE LAST CONTACT: NO
TOTAL  NUMBER OF ABORTED OR SELF INTERRUPTED ATTEMPTS SINCE LAST CONTACT: NO
2. HAVE YOU ACTUALLY HAD ANY THOUGHTS OF KILLING YOURSELF?: NO
ATTEMPT SINCE LAST CONTACT: NO
1. SINCE LAST CONTACT, HAVE YOU WISHED YOU WERE DEAD OR WISHED YOU COULD GO TO SLEEP AND NOT WAKE UP?: NO
6. HAVE YOU EVER DONE ANYTHING, STARTED TO DO ANYTHING, OR PREPARED TO DO ANYTHING TO END YOUR LIFE?: NO
SUICIDE, SINCE LAST CONTACT: NO

## 2024-03-04 ENCOUNTER — VIRTUAL VISIT (OUTPATIENT)
Dept: BEHAVIORAL HEALTH | Facility: CLINIC | Age: 34
End: 2024-03-04
Payer: COMMERCIAL

## 2024-03-04 DIAGNOSIS — F41.1 GENERALIZED ANXIETY DISORDER: Primary | ICD-10-CM

## 2024-03-04 ASSESSMENT — COLUMBIA-SUICIDE SEVERITY RATING SCALE - C-SSRS
TOTAL  NUMBER OF ABORTED OR SELF INTERRUPTED ATTEMPTS SINCE LAST CONTACT: NO
6. HAVE YOU EVER DONE ANYTHING, STARTED TO DO ANYTHING, OR PREPARED TO DO ANYTHING TO END YOUR LIFE?: NO
SUICIDE, SINCE LAST CONTACT: NO
1. SINCE LAST CONTACT, HAVE YOU WISHED YOU WERE DEAD OR WISHED YOU COULD GO TO SLEEP AND NOT WAKE UP?: NO
2. HAVE YOU ACTUALLY HAD ANY THOUGHTS OF KILLING YOURSELF?: NO
TOTAL  NUMBER OF INTERRUPTED ATTEMPTS SINCE LAST CONTACT: NO
ATTEMPT SINCE LAST CONTACT: NO

## 2024-03-04 NOTE — PROGRESS NOTES
Transition Clinic Progress Note    Patient Name:   Jose Lizama Date: March 3, 2024          Service Type: Individual        VISIT TIME START: 830      VISIT TIME END: 915      Session Length:  TC Session Length: 45 (38-52 Minutes)    Session #:  4    Attendees: TC Attendees: Client alone    Service Modality:  Service Modality: Video Visit:      Provider verified identity through the following two step process.  Patient provided:  Patient  and Patient address    Telemedicine Visit: The patient's condition can be safely assessed and treated via synchronous audio and visual telemedicine encounter.      Reason for Telemedicine Visit: Patient convenience (e.g. access to timely appointments / distance to available provider)    Originating Site (Patient Location): Patient's home    Distant Site (Provider Location): [facility]    Consent:  The patient/guardian has verbally consented to: the potential risks and benefits of telemedicine (video visit) versus in person care; bill my insurance or make self-payment for services provided; and responsibility for payment of non-covered services.     Patient would like the video invitation sent by:  My Chart    Mode of Communication:  Video Conference via [facility]    Distant Location (Provider):  Off-site    As the provider I attest to compliance with applicable laws and regulations related to telemedicine.    Informed Consent and Assessment Methods    This provider and patient discussed HIPAA, and the limits of confidentiality; including mandated reporting, the possibility of collaborative discussions with patient's primary care provider and the multidisciplinary team in the MH clinic during consultation.  Discussed the no show policy, and the benefits and possible unintended consequences of therapy.  Patient indicated understanding Transition Clinic services are short term, solution focused, until a referral can be  made and patient can bridge to long term therapy.  Patient verbally indicated understanding the informed consent.        DATA  Interactive Complexity: No  Crisis: No        Progress Since Last Session (Related to Symptoms / Goals / Homework):   Symptoms: Improving pt reports improving sxs.  Homework: Completed in session      Episode of Care Goals: Satisfactory progress - ACTION (Actively working towards change); Intervened by reinforcing change plan / affirming steps taken     Current / Ongoing Stressors and Concerns:   Pt reports improving sxs today.  Pt denies depression sxs.  Pt reports mild anxiety sxs.  Pt denies SI, HI, AH/VH.  Pt reports she is hopeful, looking forward to nice weather.  Pt reports the following protective factors:  supportive family, willingness to engage in therapy, hope for the future, attached to family/friends, embedded in a protective network, demonstrates reliability to personal/professional life.   Pt reports she is doing good and no major concerns or stressors at this point in time.  Pt talked largely about social tv episode and identifying with certain members feelings, and also some trigger identifying from it.  Pt largely processed regarding interpersonal relationships.  Pt and writer explored attachment styles secure and anxious attachment.  Writer psychoeducated on core beliefs and mindfulness.  Pt appeared motivated, engagad and receptive to the above interventions.    Plan: 3/11/24 @ 2:30pm     Treatment Objective(s) Addressed in This Session:   Encourage identify three distraction and diversion activities and use those activities to decrease level of anxiety    Encourage self-care     Intervention:      Brief Psychotherapy - discussed and prioritizing the most efficient treatment., Cognitive Behavioral Therapy (CBT) - Discussed changing thoughts is the path to changing behaviors and feelings., Mindfulness Therapy - Cultivation of present-oriented, non-judgmental attitude. It  helps nurtures great awareness, clarity, and acceptance of reality., Motivational Interviewing - helping to find the motivation to make positive behavior changes., Narrative Therapy - Discuss the ability to rewrite personal narrative to be more resilient., and Person-Centered Therapy - Actualizes potential and moves towards increased awareness, spontaneity, trust in self and inner directedness.    Assessments completed prior to visit:    [place] Suicide Severity Rating Scale (Short Version)      2/12/2024     2:00 PM 2/26/2024     9:00 AM 3/4/2024     9:00 AM   [place] Suicide Severity Rating (Short Version)   1. Wish to be Dead (Since Last Contact) N N N   2. Non-Specific Active Suicidal Thoughts (Since Last Contact) N N N   Actual Attempt (Since Last Contact) N N N   Has subject engaged in non-suicidal self-injurious behavior? (Since Last Contact) N N N   Interrupted Attempts (Since Last Contact) N N N   Aborted or Self-Interrupted Attempt (Since Last Contact) N N N   Preparatory Acts or Behavior (Since Last Contact) N N N   Suicide (Since Last Contact) N N N   Calculated C-SSRS Risk Score (Since Last Contact) No Risk Indicated No Risk Indicated No Risk Indicated          ASSESSMENT: Current Emotional / Mental Status (status of significant symptoms):   Risk status (Self / Other harm or suicidal ideation)   Patient denies current fears or concerns for personal safety.   Patient denies current or recent suicidal ideation or behaviors.   Patient denies current or recent homicidal ideation or behaviors.   Patient denies current or recent self injurious behavior or ideation.   Patient denies other safety concerns.   Patient reports there has been no change in risk factors since their last session.     Patient reports there has been no change in protective factors since their last session.     Recommended that patient call 911 or go to the local ED should there be a change in any of these risk  factors.     Appearance:   Appropriate    Eye Contact:   Good    Psychomotor Behavior: Normal    Attitude:   Cooperative  Friendly Pleasant   Orientation:   All   Speech    Rate / Production: Normal/ Responsive Normal     Volume:  Normal    Mood:    Normal   Affect:    Appropriate    Thought Content:  Clear    Thought Form:  Coherent  Logical    Insight:    Good      Medication Review:   No current psychiatric medications prescribed     Medication Compliance:   No     Changes in Health Issues:   None reported     Chemical Use Review:   Substance Use: Chemical use reviewed, no active concerns identified      Tobacco Use: No current tobacco use.      Diagnoses:   300.02 (F41.1) Generalized Anxiety Disorder    Collateral Reports Completed:    Collateral[facility].    PLAN: (Patient Tasks / Therapist Tasks / Other)    3/11/24 @ 2:30pm    Procedure Code: Psychotherapy (with patient) - 45 [CPT 80933]    MARIN FROSTJOSE MIGUEL                                                      ____________________________________________________________________________________________________________________________  Treatment Plan     Patient's Name: Jose Lizama                YOB: 1990  Date of Creation: 2/26/24  Date Treatment Plan Last Reviewed/Revised: Initial     DSM5 Diagnoses: 300.02 (F41.1) Generalized Anxiety Disorder  Psychosocial / Contextual Factors: Pt with reported situational stressors.  PROMIS (reviewed every 90 days):      Referral / Collaboration:  N/A .     Anticipated number of session for this episode of care: 6-9 sessions  Anticipation frequency of session: Weekly  Anticipated Duration of each session: 38-52 minutes  Treatment plan will be reviewed in 90 days or when goals have been changed.         MeasurableTreatment Goal(s) related to diagnosis / functional impairment(s)  Goal 1: Patient will reduce overall frequency, intensity, and duration of the  anxiety so that daily functioning is not impaired.       Objective #A                      Patient will describe situations, thoughts, feelings, and actions associated with anxieties and worries, their impact on functioning, and attempts to resolve them.      Intervention(s)      LMHP will focus on developing a level of trust with the patient; provide support and empathy to encourage the client to feel safe in expressing their symptoms.       LMHP will ask the patient to describe their past experiences of anxiety and their impact on functioning: assess the focus, excessiveness, and uncontrollability of the worry and the type, frequency, intensity, and duration of their anxiety symptoms.       Goal 2: Patient will learn and implement coping skills that result in a reduction of anxiety and worry and improved daily functioning.       Objective A                        Patient will learn and implement calming skills to reduce overall anxiety and manage anxiety symptoms      Objective B      Patient will verbalize understanding of the role that cognitive biases play in excessive irrational worry and persistent anxiety symptoms.       Intervention(s)      LMHP will teach the patient calming/relaxation skills and how to differentiate between relaxation and tension while teaching the patient how to implement these strategies in their daily life.       LMHP will assist the patient in analyzing their worries by examining cognitive distortions and the patient s ability to control their emotions surrounding cognitive distortions..        Patient has reviewed and agreed to the above plan.        MARIN FROST                February 25, 2024     ___________________________________________________________________________________________________________________________  Patient has no change in safety concerns. Committed to safety and agreed to follow previously developed safety plan.  Reviewed safety plan with pt, pt agreed to  "follow, safety plan made available to pt, safety plan at the bottom of this note and in pts active juanahart.     Safety Plan       If I am feeling unsafe or I am in a crisis, I will:   -Contact my established care providers   -Call the National Suicide Prevention Lifeline: [phone]   -Go to the nearest emergency room   -Call 911      Warning signs that I or other people might notice when a crisis is developing for me:  increased \"big feelings\"  -Decreased appetite,    -Decreased sleep   -Restlessness   -Increased thoughts about wanting to die   -Increased irritability or agitation.       Things I am able to do on my own to cope or help me feel better:    -take a time-out. Practice yoga, listen to music, meditate, get a massage, or learn relaxation techniques. Stepping back from the problem helps clear your head.   -Eat well-balanced meals. Do not skip any meals. Do keep healthful, energy-boosting snacks on hand.   -Limit alcohol and caffeine   -Get enough sleep. When stressed, your body needs additional sleep and rest.    -Exercise daily to help you feel good and maintain your health.   -Accept that you cannot control everything. Put your stress in perspective: Is it really as bad as you think?    -Welcome humor. A good laugh goes a long way.      Things that I am able to do with others to cope or help me better:    -Listen and talk about concerns   -Continue to follow with outpatient care providers and case management    -Go for a walk   -Distract with going out to eat, to a movie or a park.       Things I can use or do for distraction:    -Watch a TV show. If you don't have cable or a subscription service, many television networks offer free access, without a log-in, until you get closer to the most recent episodes.   -Watch a movie. Light-hearted comedy, drama to suck you in, or an old favorite - there are countless films to whisk you away for a bit.   -Sing. It doesn't matter if you're a professional vocalist " or can't to carry a tune, singing engages a completely different part of your brain. Plus, the vibrations in your chest give great sensory feedback and the vocalization reminds you of your voice.   -Watch cute videos on YouDiaferonube. About as low-effort as it gets: puppy/kassy videos, laugh challenges, or Vine compilations - take your pick.  -Mindless doodles/finger painting/playing with marcial. This may be especially helpful to those with child parts (DID/OSDD) who need an activity of their own.   -Grab a snack.   -Drum on a surface. Like singing, the vibrations and bilateral stimulation of your hands thumping will engage different parts of your mind and bring your attention away from what's intruding on you.   -Play a game or use a fun evan on your phone. Even if you aren't a , search the evan store. You might find one that speaks to you. It can be a great escape to get lost in for a bit.   -Video games. Any console, any game!   -Tear out words/photos/etc for a collage. Ask a local doctor's office or hairdresser for their spare magazines. Mindlessly rip out photos and words that speak to you. (Bonus: you may get to put tabloids to good use for once! They often have the scathing, overdramatic words that happen to be great for a therapeutic collages. Shocking! Betrayal! You Won't Believe It!).   -Discover new music. WeGatherube, Spotify, -[place], so many ways to find new gems!   -Wash your face/hands or brush your teeth. A quick refresher can help you restart your day on a brand new page.   -Re-watch highlights from your favorite sport. It's easy to forget just how many epic, captivating moments there were once some time has passed. Relive your excitement. Plus, you already know how it ends, so you don't have to pay super close attention!   -Gratitude list. When your mind only wants to remind you of distressing things, focusing on 10+ things you're grateful for can really take you to a whole new atmosphere in your mind and  heart.  -Imagery exercises. Containment exercises, healing pool/healing light, guided meditation, so many options!   -Play a board game with a friend. Something simple like Sorry!, challenging like chess, or silly like Cards Against Humanity, there are lots of options to distract you in the company of friends.   -Card games. Solo works, too, if there's no one around.      Changes I can make to support my mental health and wellness:      1. Value yourself: Treat yourself with kindness and respect, and avoid self-criticism. Make time for your hobbies and favorite projects, or broaden your horizons. Do a daily crossword puzzle, plant a garden, take dance lessons, learn to play an instrument or become fluent in another language.      2. Take care of your body: Taking care of yourself physically can improve your mental health. Be sure to:   -Eat nutritious meals   -Avoid smoking and vaping-  -Drink plenty of water   -Exercise, which helps decrease depression and anxiety and improve moods   -Get enough sleep. Researchers believe that lack of sleep contributes to a high rate of depression in college students.       3. Surround yourself with good people: People with strong family or social connections are generally healthier than those who lack a support network. Make plans with supportive family members and friends, or seek out activities where you can meet new people, such as a club, class or support group.      4. Give yourself: Volunteer your time and energy to help someone else. You'll feel good about doing something tangible to help someone in need -- and it's a great way to meet new people. See Fun and Cheap Things to do in [place] for ideas.      5. Learn how to deal with stress: Like it or not, stress is a part of life. Practice good coping skills: Try One-Minute Stress Strategies, do Rehan Chi, exercise, take a nature walk, play with your pet or try journal writing as a stress reducer. Also, remember to smile and  "see the humor in life. Research shows that laughter can boost your immune system, ease pain, relax your body and reduce stress.      6. Quiet your mind: Try meditating, Mindfulness and/or prayer. Relaxation exercises and prayer can improve your state of mind and outlook on life. In fact, research shows that meditation may help you feel calm and enhance the effects of therapy. To get connected, see spiritual resources on Personal Well-being for Students      7. Set realistic goals: Decide what you want to achieve academically, professionally and personally, and write down the steps you need to realize your goals. Aim high, but be realistic and don't over-schedule. You'll enjoy a tremendous sense of accomplishment and self-worth as you progress toward your goal. Wellness Coaching, free to - students, can help you develop goals and stay on track.       8. Break up the monotony: Although our routines make us more efficient and enhance our feelings of security and safety, a little change of pace can perk up a tedious schedule. Alter your jogging route, plan a road-trip, take a walk in a different park, hang some new pictures or try a new restaurant.      9. Avoid alcohol and other drugs: Keep alcohol use to a minimum and avoid other drugs. Sometimes people use alcohol and other drugs to \"self-medicate\" but in reality, alcohol and other drugs only aggravate problems.      10. Get help when you need it: Seeking help is a sign of strength -- not a weakness. And it is important to remember that treatment is effective. People who get appropriate care can recover from mental illness and addiction and lead full, rewarding lives.      People in my life that I can ask for help: parents, friends  -Spouse   -Friends   -Therapist    -Other Mental health Supportive Services      Your [facility] has a mental health crisis team you can call 24/7:    Phone: [facility] Crisis line:  [phone]     Other things that are " "important when I m in crisis for myself or others to do:     -Keep your voice calm   -Avoid overreacting   -Listen to the person   -Express support and concern   -Avoid continuous eye contact   -Ask how you can help   -Keep stimulation level low   -Move slowly   -Offer options instead of trying to take control   -Avoid touching the person unless you ask permission   -Be patient    -Gently announce actions before initiating them    -Give them space, don t make them feel  trapped   -Don t make judgmental comments   -Don t argue or try to reason with the person      Additional resources and information:       National [place] on Mental Illness (MARGOT) [place]: Connect for help, to navigate the mental health system, and for support and for resources. Call: 1-839-ZYGS-Helps / 3-631-302-9237      Crisis Text Line: The 24/7 emergency service is available if you or someone you know is experiencing a psychiatric or mental health crisis. Text: \"[place]\" to 293554      [place] Warmline: Are you an adult needing support? Talk to a specialist who has firsthand experience living with a mental health condition. Call: 662.888.6548. Text: \"Support\" to 24441      National Suicide Prevention Lifeline: The 24/7 lifeline provides support when in distress, has prevention and crisis resources for you or your loved ones, and resources for professionals.  Call 9-499-045-TALK (7231)      Peer Support Connection Warmlines: Bfec-te-gidt telephone support that s safe and supportive. Open 5 p.m. to 9 a.m. Call or text: 1-780.687.4179       COVID Cares Stress Phone Support Service. Any [place] experiencing stress can call 798-SPFA1MQ (193-947-2732) for free telephone support from 9am to 9pm every day. The service is a collaboration with volunteers from the [place] [employer], the [place] Psychological Association, the [place] [employer], and [facility] [place]. The free service is also accessible at " [email or web address].org where searchers can also find psychiatric and mental health services availability and real-time Substance Use Disorder Treatment program openings.      Mental Health Apps      My3  [email or web address]/      VirtualHopeBox  [email or web address]

## 2024-03-05 ENCOUNTER — ANCILLARY PROCEDURE (OUTPATIENT)
Dept: GENERAL RADIOLOGY | Facility: CLINIC | Age: 34
End: 2024-03-05
Attending: FAMILY MEDICINE
Payer: COMMERCIAL

## 2024-03-05 ENCOUNTER — MYC MEDICAL ADVICE (OUTPATIENT)
Dept: FAMILY MEDICINE | Facility: CLINIC | Age: 34
End: 2024-03-05
Payer: COMMERCIAL

## 2024-03-05 ENCOUNTER — OFFICE VISIT (OUTPATIENT)
Dept: ORTHOPEDICS | Facility: CLINIC | Age: 34
End: 2024-03-05
Payer: COMMERCIAL

## 2024-03-05 DIAGNOSIS — R07.81 RIB PAIN: Primary | ICD-10-CM

## 2024-03-05 DIAGNOSIS — R07.81 RIB PAIN: ICD-10-CM

## 2024-03-05 PROCEDURE — 71101 X-RAY EXAM UNILAT RIBS/CHEST: CPT | Mod: LT | Performed by: RADIOLOGY

## 2024-03-05 PROCEDURE — 99203 OFFICE O/P NEW LOW 30 MIN: CPT | Performed by: FAMILY MEDICINE
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
## 2024-03-05 NOTE — TELEPHONE ENCOUNTER
RN replied to patient via [employer]. See message for details.     Baldo Kim RN, BSN, PHN  [facility]: [place]

## 2024-03-11 ENCOUNTER — VIRTUAL VISIT (OUTPATIENT)
Dept: BEHAVIORAL HEALTH | Facility: CLINIC | Age: 34
End: 2024-03-11
Payer: COMMERCIAL

## 2024-03-11 DIAGNOSIS — F41.1 GENERALIZED ANXIETY DISORDER: Primary | ICD-10-CM

## 2024-03-11 ASSESSMENT — COLUMBIA-SUICIDE SEVERITY RATING SCALE - C-SSRS
6. HAVE YOU EVER DONE ANYTHING, STARTED TO DO ANYTHING, OR PREPARED TO DO ANYTHING TO END YOUR LIFE?: NO
TOTAL  NUMBER OF INTERRUPTED ATTEMPTS SINCE LAST CONTACT: NO
2. HAVE YOU ACTUALLY HAD ANY THOUGHTS OF KILLING YOURSELF?: NO
ATTEMPT SINCE LAST CONTACT: NO
TOTAL  NUMBER OF ABORTED OR SELF INTERRUPTED ATTEMPTS SINCE LAST CONTACT: NO
1. SINCE LAST CONTACT, HAVE YOU WISHED YOU WERE DEAD OR WISHED YOU COULD GO TO SLEEP AND NOT WAKE UP?: NO
SUICIDE, SINCE LAST CONTACT: NO

## 2024-03-11 ASSESSMENT — ANXIETY QUESTIONNAIRES
GAD7 TOTAL SCORE: 6
IF YOU CHECKED OFF ANY PROBLEMS ON THIS QUESTIONNAIRE, HOW DIFFICULT HAVE THESE PROBLEMS MADE IT FOR YOU TO DO YOUR WORK, TAKE CARE OF THINGS AT HOME, OR GET ALONG WITH OTHER PEOPLE: SOMEWHAT DIFFICULT
2. NOT BEING ABLE TO STOP OR CONTROL WORRYING: SEVERAL DAYS
8. IF YOU CHECKED OFF ANY PROBLEMS, HOW DIFFICULT HAVE THESE MADE IT FOR YOU TO DO YOUR WORK, TAKE CARE OF THINGS AT HOME, OR GET ALONG WITH OTHER PEOPLE?: SOMEWHAT DIFFICULT
6. BECOMING EASILY ANNOYED OR IRRITABLE: SEVERAL DAYS
7. FEELING AFRAID AS IF SOMETHING AWFUL MIGHT HAPPEN: SEVERAL DAYS
1. FEELING NERVOUS, ANXIOUS, OR ON EDGE: SEVERAL DAYS
5. BEING SO RESTLESS THAT IT IS HARD TO SIT STILL: NOT AT ALL
3. WORRYING TOO MUCH ABOUT DIFFERENT THINGS: SEVERAL DAYS
GAD7 TOTAL SCORE: 6
GAD7 TOTAL SCORE: 6
4. TROUBLE RELAXING: SEVERAL DAYS
7. FEELING AFRAID AS IF SOMETHING AWFUL MIGHT HAPPEN: SEVERAL DAYS

## 2024-03-11 NOTE — PROGRESS NOTES
Transition Clinic Progress Note    Patient Name:   Jose Lizama Date: 2024          Service Type: Individual        VISIT TIME START: 1430      VISIT TIME END: 1500      Session Length:  TC Session Length: 30 (16-37 Minutes)    Session #:  5    Attendees: TC Attendees: Client alone    Service Modality:  Service Modality: Video Visit:      Provider verified identity through the following two step process.  Patient provided:  Patient  and Patient address    Telemedicine Visit: The patient's condition can be safely assessed and treated via synchronous audio and visual telemedicine encounter.      Reason for Telemedicine Visit: Patient convenience (e.g. access to timely appointments / distance to available provider)    Originating Site (Patient Location): Patient's home    Distant Site (Provider Location): [facility]    Consent:  The patient/guardian has verbally consented to: the potential risks and benefits of telemedicine (video visit) versus in person care; bill my insurance or make self-payment for services provided; and responsibility for payment of non-covered services.     Patient would like the video invitation sent by:  My Chart    Mode of Communication:  Video Conference via [facility]    Distant Location (Provider):  Off-site    As the provider I attest to compliance with applicable laws and regulations related to telemedicine.    Informed Consent and Assessment Methods    This provider and patient discussed HIPAA, and the limits of confidentiality; including mandated reporting, the possibility of collaborative discussions with patient's primary care provider and the multidisciplinary team in the MH clinic during consultation.  Discussed the no show policy, and the benefits and possible unintended consequences of therapy.  Patient indicated understanding Transition Clinic services are short term, solution focused, until a referral can be  made and patient can bridge to long term therapy.  Patient verbally indicated understanding the informed consent.        DATA  Interactive Complexity: No  Crisis: No        Progress Since Last Session (Related to Symptoms / Goals / Homework):   Symptoms: No change Pt reports a family stressor last work, reports working on resolving has EAP family session later today, per pt report.  Homework: Completed in session      Episode of Care Goals: Satisfactory progress - ACTION (Actively working towards change); Intervened by reinforcing change plan / affirming steps taken     Current / Ongoing Stressors and Concerns:   Pt today largely processed most the session today, regarding a family stressor, pt talked about how it made her felt and how she is working on her own things that come with it.  Writer provided validation and empathy.  Pt denies current SI, HI, AH/VH, at this point in time.  Pt reports she is hopeful, looking forward to weather.  Pt reports the following protective factors:  supportive family, willingness to engage in therapy, hope for the future, attached to family/friends, embedded in a protective network, demonstrates reliability to personal/professional life. .   Writer encouraged pt LOCUS of control, and self-care and compassion.  Pt appeared engaged and receptive to the above interventions.       Treatment Objective(s) Addressed in This Session:   identify three distraction and diversion activities and use those activities to decrease level of anxiety    Encourage self-care     Intervention:      Brief Psychotherapy - discussed and prioritizing the most efficient treatment., Cognitive Behavioral Therapy (CBT) - Discussed changing thoughts is the path to changing behaviors and feelings., Motivational Interviewing - helping to find the motivation to make positive behavior changes., and Person-Centered Therapy - Actualizes potential and moves towards increased awareness, spontaneity, trust in self and  inner directedness.    Assessments completed prior to visit:    [place] Suicide Severity Rating Scale (Short Version)      2/12/2024     2:00 PM 2/26/2024     9:00 AM 3/4/2024     9:00 AM 3/11/2024     3:00 PM   [place] Suicide Severity Rating (Short Version)   1. Wish to be Dead (Since Last Contact) N N N N   2. Non-Specific Active Suicidal Thoughts (Since Last Contact) N N N N   Actual Attempt (Since Last Contact) N N N N   Has subject engaged in non-suicidal self-injurious behavior? (Since Last Contact) N N N N   Interrupted Attempts (Since Last Contact) N N N N   Aborted or Self-Interrupted Attempt (Since Last Contact) N N N N   Preparatory Acts or Behavior (Since Last Contact) N N N N   Suicide (Since Last Contact) N N N N   Calculated C-SSRS Risk Score (Since Last Contact) No Risk Indicated No Risk Indicated No Risk Indicated No Risk Indicated           ASSESSMENT: Current Emotional / Mental Status (status of significant symptoms):   Risk status (Self / Other harm or suicidal ideation)   Patient denies current fears or concerns for personal safety.   Patient denies current or recent suicidal ideation or behaviors.   Patient denies current or recent homicidal ideation or behaviors.   Patient denies current or recent self injurious behavior or ideation.   Patient denies other safety concerns.   Patient reports there has been no change in risk factors since their last session.     Patient reports there has been no change in protective factors since their last session.     Recommended that patient call 911 or go to the local ED should there be a change in any of these risk factors.     Appearance:   Appropriate    Eye Contact:   Good    Psychomotor Behavior: Normal    Attitude:   Cooperative    Orientation:   All   Speech    Rate / Production: Normal     Volume:  Normal    Mood:    Normal   Affect:    Appropriate    Thought Content:  Clear    Thought Form:  Coherent  Logical    Insight:    Good      Medication  Review:   No current psychiatric medications prescribed     Medication Compliance:   NA     Changes in Health Issues:   None reported     Chemical Use Review:   Substance Use: Chemical use reviewed, no active concerns identified      Tobacco Use: No current tobacco use.      Diagnoses:   300.02 (F41.1) Generalized Anxiety Disorder    Collateral Reports Completed:   SHAWN Collateral: [facility] electronic medical records reviewed.    PLAN: (Patient Tasks / Therapist Tasks / Other)  Pt reports she will call scheduling to schedule follow up session as pt needs to find her calendar first for appointment availability.    Procedure Code: Psychotherapy (with patient) - 30  [CPT 27932]    JOSE MIGUEL WARREN    Safety plan reviewed with pt, pt agrees to follow, safety plan made available to pt, safety plan at the bottom of this note, and in pts active mychart.   ____________________________________________________________________________________________________________________________  Treatment Plan     Patient's Name: Jose Lizama                YOB: 1990  Date of Creation: 2/26/24  Date Treatment Plan Last Reviewed/Revised: Initial     DSM5 Diagnoses: 300.02 (F41.1) Generalized Anxiety Disorder  Psychosocial / Contextual Factors: Pt with reported situational stressors.  PROMIS (reviewed every 90 days):      Referral / Collaboration:  N/A .     Anticipated number of session for this episode of care: 6-9 sessions  Anticipation frequency of session: Weekly  Anticipated Duration of each session: 38-52 minutes  Treatment plan will be reviewed in 90 days or when goals have been changed.         MeasurableTreatment Goal(s) related to diagnosis / functional impairment(s)  Goal 1: Patient will reduce overall frequency, intensity, and duration of the anxiety so that daily functioning is not impaired.       Objective #A                      Patient will describe situations, thoughts, feelings, and actions  associated with anxieties and worries, their impact on functioning, and attempts to resolve them.      Intervention(s)      LMHP will focus on developing a level of trust with the patient; provide support and empathy to encourage the client to feel safe in expressing their symptoms.       LMHP will ask the patient to describe their past experiences of anxiety and their impact on functioning: assess the focus, excessiveness, and uncontrollability of the worry and the type, frequency, intensity, and duration of their anxiety symptoms.       Goal 2: Patient will learn and implement coping skills that result in a reduction of anxiety and worry and improved daily functioning.       Objective A                        Patient will learn and implement calming skills to reduce overall anxiety and manage anxiety symptoms      Objective B      Patient will verbalize understanding of the role that cognitive biases play in excessive irrational worry and persistent anxiety symptoms.       Intervention(s)      LMHP will teach the patient calming/relaxation skills and how to differentiate between relaxation and tension while teaching the patient how to implement these strategies in their daily life.       LMHP will assist the patient in analyzing their worries by examining cognitive distortions and the patient s ability to control their emotions surrounding cognitive distortions..        Patient has reviewed and agreed to the above plan.        MARIN FROST                February 25, 2024     ___________________________________________________________________________________________________________________________  Patient has no change in safety concerns. Committed to safety and agreed to follow previously developed safety plan.  Reviewed safety plan with pt, pt agreed to follow, safety plan made available to pt, safety plan at the bottom of this note and in pts active mychart.     Safety Plan       If I am feeling unsafe or I  "am in a crisis, I will:   -Contact my established care providers   -Call the National Suicide Prevention Lifeline: 336.332.7764   -Go to the nearest emergency room   -Call 911      Warning signs that I or other people might notice when a crisis is developing for me:  increased \"big feelings\"  -Decreased appetite,    -Decreased sleep   -Restlessness   -Increased thoughts about wanting to die   -Increased irritability or agitation.       Things I am able to do on my own to cope or help me feel better:    -take a time-out. Practice yoga, listen to music, meditate, get a massage, or learn relaxation techniques. Stepping back from the problem helps clear your head.   -Eat well-balanced meals. Do not skip any meals. Do keep healthful, energy-boosting snacks on hand.   -Limit alcohol and caffeine   -Get enough sleep. When stressed, your body needs additional sleep and rest.    -Exercise daily to help you feel good and maintain your health.   -Accept that you cannot control everything. Put your stress in perspective: Is it really as bad as you think?    -Welcome humor. A good laugh goes a long way.      Things that I am able to do with others to cope or help me better:    -Listen and talk about concerns   -Continue to follow with outpatient care providers and case management    -Go for a walk   -Distract with going out to eat, to a movie or a park.       Things I can use or do for distraction:    -Watch a TV show. If you don't have cable or a subscription service, many television networks offer free access, without a log-in, until you get closer to the most recent episodes.   -Watch a movie. Light-hearted comedy, drama to suck you in, or an old favorite - there are countless films to whisk you away for a bit.   -Sing. It doesn't matter if you're a professional vocalist or can't to carry a tune, singing engages a completely different part of your brain. Plus, the vibrations in your chest give great sensory feedback and the " vocalization reminds you of your voice.   -Watch cute videos on [employer]. About as low-effort as it gets: puppy/kassy videos, laugh challenges, or Vine compilations - take your pick.  -Mindless doodles/finger painting/playing with marcial. This may be especially helpful to those with child parts (DID/OSDD) who need an activity of their own.   -Grab a snack.   -Drum on a surface. Like singing, the vibrations and bilateral stimulation of your hands thumping will engage different parts of your mind and bring your attention away from what's intruding on you.   -Play a game or use a fun evan on your phone. Even if you aren't a , search the evan store. You might find one that speaks to you. It can be a great escape to get lost in for a bit.   -Video games. Any console, any game!   -Tear out words/photos/etc for a collage. Ask a local doctor's office or hairdresser for their spare magazines. Mindlessly rip out photos and words that speak to you. (Bonus: you may get to put tabloids to good use for once! They often have the scathing, overdramatic words that happen to be great for a therapeutic collages. Shocking! Betrayal! You Won't Believe It!).   -Discover new music. [employer], vLex, -Clemons, so many ways to find new gems!   -Wash your face/hands or brush your teeth. A quick refresher can help you restart your day on a brand new page.   -Re-watch highlights from your favorite sport. It's easy to forget just how many epic, captivating moments there were once some time has passed. Relive your excitement. Plus, you already know how it ends, so you don't have to pay super close attention!   -Gratitude list. When your mind only wants to remind you of distressing things, focusing on 10+ things you're grateful for can really take you to a whole new atmosphere in your mind and heart.  -Imagery exercises. Containment exercises, healing pool/healing light, guided meditation, so many options!   -Play a board game with a friend.  Something simple like Sorry!, challenging like chess, or silly like Cards Against Humanity, there are lots of options to distract you in the company of friends.   -Card games. Solo works, too, if there's no one around.      Changes I can make to support my mental health and wellness:      1. Value yourself: Treat yourself with kindness and respect, and avoid self-criticism. Make time for your hobbies and favorite projects, or broaden your horizons. Do a daily crossword puzzle, plant a garden, take dance lessons, learn to play an instrument or become fluent in another language.      2. Take care of your body: Taking care of yourself physically can improve your mental health. Be sure to:   -Eat nutritious meals   -Avoid smoking and vaping-  -Drink plenty of water   -Exercise, which helps decrease depression and anxiety and improve moods   -Get enough sleep. Researchers believe that lack of sleep contributes to a high rate of depression in college students.       3. Surround yourself with good people: People with strong family or social connections are generally healthier than those who lack a support network. Make plans with supportive family members and friends, or seek out activities where you can meet new people, such as a club, class or support group.      4. Give yourself: Volunteer your time and energy to help someone else. You'll feel good about doing something tangible to help someone in need -- and it's a great way to meet new people. See Fun and Cheap Things to do in [place] for ideas.      5. Learn how to deal with stress: Like it or not, stress is a part of life. Practice good coping skills: Try One-Minute Stress Strategies, do Rehan Chi, exercise, take a nature walk, play with your pet or try journal writing as a stress reducer. Also, remember to smile and see the humor in life. Research shows that laughter can boost your immune system, ease pain, relax your body and reduce stress.      6. Quiet your  "mind: Try meditating, Mindfulness and/or prayer. Relaxation exercises and prayer can improve your state of mind and outlook on life. In fact, research shows that meditation may help you feel calm and enhance the effects of therapy. To get connected, see spiritual resources on Personal Well-being for Students      7. Set realistic goals: Decide what you want to achieve academically, professionally and personally, and write down the steps you need to realize your goals. Aim high, but be realistic and don't over-schedule. You'll enjoy a tremendous sense of accomplishment and self-worth as you progress toward your goal. Wellness Coaching, free to [employer] students, can help you develop goals and stay on track.       8. Break up the monotony: Although our routines make us more efficient and enhance our feelings of security and safety, a little change of pace can perk up a tedious schedule. Alter your jogging route, plan a road-trip, take a walk in a different park, hang some new pictures or try a new restaurant.      9. Avoid alcohol and other drugs: Keep alcohol use to a minimum and avoid other drugs. Sometimes people use alcohol and other drugs to \"self-medicate\" but in reality, alcohol and other drugs only aggravate problems.      10. Get help when you need it: Seeking help is a sign of strength -- not a weakness. And it is important to remember that treatment is effective. People who get appropriate care can recover from mental illness and addiction and lead full, rewarding lives.      People in my life that I can ask for help: parents, friends  -Spouse   -Friends   -Therapist    -Other Mental health Supportive Services      Your [facility] has a mental health crisis team you can call 24/7:    Phone: [facility] Crisis line:  [phone]     Other things that are important when I m in crisis for myself or others to do:     -Keep your voice calm   -Avoid overreacting   -Listen to the person   -Express support and " "concern   -Avoid continuous eye contact   -Ask how you can help   -Keep stimulation level low   -Move slowly   -Offer options instead of trying to take control   -Avoid touching the person unless you ask permission   -Be patient    -Gently announce actions before initiating them    -Give them space, don t make them feel  trapped   -Don t make judgmental comments   -Don t argue or try to reason with the person      Additional resources and information:       National [place] on Mental Illness (MARGOT) [place]: Connect for help, to navigate the mental health system, and for support and for resources. Call: 3-340-PFUR-Helps / 3-805-127-4248      Crisis Text Line: The 24/7 emergency service is available if you or someone you know is experiencing a psychiatric or mental health crisis. Text: \"[place]\" to 543375      [place] Warmline: Are you an adult needing support? Talk to a specialist who has firsthand experience living with a mental health condition. Call: 669.643.6530. Text: \"Support\" to 88933      National Suicide Prevention Lifeline: The 24/7 lifeline provides support when in distress, has prevention and crisis resources for you or your loved ones, and resources for professionals.  Call 5-239-359-TALK (3212)      Peer Support Connection Warmlines: Npjo-lr-fofl telephone support that s safe and supportive. Open 5 p.m. to 9 a.m. Call or text: 1-875.499.6241       COVID Cares Stress Phone Support Service. Any [place] experiencing stress can call 290-MGVP2[place] (799-892-8822) for free telephone support from 9am to 9pm every day. The service is a collaboration with volunteers from the [place] Psychiatric Society, the [place] Psychological Association, the [place] [employer], and [facility] [place]. The free service is also accessible at [employer] where searchers can also find psychiatric and mental health services availability and real-time Substance Use Disorder Treatment program openings.    "   Mental Health Apps      My3  [email or web address]      VirtualHopeBox  [email or web address]

## 2024-03-12 ENCOUNTER — TELEPHONE (OUTPATIENT)
Dept: BEHAVIORAL HEALTH | Facility: CLINIC | Age: 34
End: 2024-03-12
Payer: COMMERCIAL

## 2024-03-12 NOTE — TELEPHONE ENCOUNTER
Pt called asking to schedule a return visit with Gaby. Return scheduled 3/18/2024.    Roya Palacios  Transition Clinic Coordinator  03/12/24 9:07 AM

## 2024-03-17 NOTE — PROGRESS NOTES
Transition Clinic Progress Note    Patient Name:   Jose Lizama Date: 2024          Service Type: Individual        VISIT TIME START: 830      VISIT TIME END: 915      Session Length:  TC Session Length: 45 (38-52 Minutes)    Session #:  6    Attendees: TC Attendees: Client alone    Service Modality:  Service Modality: Video Visit:      Provider verified identity through the following two step process.  Patient provided:  Patient  and Patient address    Telemedicine Visit: The patient's condition can be safely assessed and treated via synchronous audio and visual telemedicine encounter.      Reason for Telemedicine Visit: Patient convenience (e.g. access to timely appointments / distance to available provider)    Originating Site (Patient Location): Patient's home    Distant Site (Provider Location): [facility]    Consent:  The patient/guardian has verbally consented to: the potential risks and benefits of telemedicine (video visit) versus in person care; bill my insurance or make self-payment for services provided; and responsibility for payment of non-covered services.     Patient would like the video invitation sent by:  My Chart    Mode of Communication:  Video Conference via [facility]    Distant Location (Provider):  Off-site    As the provider I attest to compliance with applicable laws and regulations related to telemedicine.    Informed Consent and Assessment Methods    This provider and patient discussed HIPAA, and the limits of confidentiality; including mandated reporting, the possibility of collaborative discussions with patient's primary care provider and the multidisciplinary team in the MH clinic during consultation.  Discussed the no show policy, and the benefits and possible unintended consequences of therapy.  Patient indicated understanding Transition Clinic services are short term, solution focused, until a referral can be  "made and patient can bridge to long term therapy.  Patient verbally indicated understanding the informed consent.        DATA  Interactive Complexity: No  Crisis: No        Progress Since Last Session (Related to Symptoms / Goals / Homework):   Symptoms: Improving pt reports improving depression sxs, anxiety sxs same.  Homework: Completed in session      Episode of Care Goals: Satisfactory progress - ACTION (Actively working towards change); Intervened by reinforcing change plan / affirming steps taken     Current / Ongoing Stressors and Concerns:   Pt reports depression sxs are improving with low screener today, pt endorses anxiety sxs excessive worry.  Pt denies SI, HI, AH/VH.  Pt reports she is hopeful, looking forward to snow\". Pt reports the following protective factors:  supportive family, willingness to engage in therapy, hope for the future, attached to family/friends, embedded in a protective network, demonstrates reliability to personal/professional life.    Pt processed much of session regarding family issues and relationship.  Pt and writer explored LOCUS of control and challenging negative thoughts.  Writer psychoeducated on Adlers birth order and Gottmans theory, with focus on criticism and stonewall.  Pt appeared engaged and receptive to the above interventions.    Plan:  4/1/24 @ 8:30am     Treatment Objective(s) Addressed in This Session:   use cognitive strategies identified in therapy to challenge anxious thoughts  Encourage self-care and self-compassion  Encourage LOCUS of control       Intervention:      Brief Psychotherapy - discussed and prioritizing the most efficient treatment., Cognitive Behavioral Therapy (CBT) - Discussed changing thoughts is the path to changing behaviors and feelings., Motivational Interviewing - helping to find the motivation to make positive behavior changes., and Person-Centered Therapy - Actualizes potential and moves towards increased awareness, spontaneity, trust " in self and inner directedness.    Assessments completed prior to visit:    [place] Suicide Severity Rating Scale (Short Version)      2/12/2024     2:00 PM 2/26/2024     9:00 AM 3/4/2024     9:00 AM 3/11/2024     3:00 PM 3/18/2024     9:00 AM   [place] Suicide Severity Rating (Short Version)   1. Wish to be Dead (Since Last Contact) N N N N N   2. Non-Specific Active Suicidal Thoughts (Since Last Contact) N N N N N   Actual Attempt (Since Last Contact) N N N N N   Has subject engaged in non-suicidal self-injurious behavior? (Since Last Contact) N N N N N   Interrupted Attempts (Since Last Contact) N N N N N   Aborted or Self-Interrupted Attempt (Since Last Contact) N N N N N   Preparatory Acts or Behavior (Since Last Contact) N N N N N   Suicide (Since Last Contact) N N N N N   Calculated C-SSRS Risk Score (Since Last Contact) No Risk Indicated No Risk Indicated No Risk Indicated No Risk Indicated No Risk Indicated           ASSESSMENT: Current Emotional / Mental Status (status of significant symptoms):   Risk status (Self / Other harm or suicidal ideation)   Patient denies current fears or concerns for personal safety.   Patient denies current or recent suicidal ideation or behaviors.   Patient denies current or recent homicidal ideation or behaviors.   Patient denies current or recent self injurious behavior or ideation.   Patient denies other safety concerns.   Patient reports there has been no change in risk factors since their last session.     Patient reports there has been no change in protective factors since their last session.     Recommended that patient call 911 or go to the local ED should there be a change in any of these risk factors.     Appearance:   Appropriate    Eye Contact:   Good    Psychomotor Behavior: Normal    Attitude:   Cooperative    Orientation:   All   Speech    Rate / Production: Normal     Volume:  Normal    Mood:    Normal   Affect:    Appropriate    Thought Content:  Clear     Thought Form:  Coherent  Logical    Insight:    Good      Medication Review:   No current psychiatric medications prescribed     Medication Compliance:   NA     Changes in Health Issues:   None reported     Chemical Use Review:   Substance Use: Chemical use reviewed, no active concerns identified      Tobacco Use: No current tobacco use.      Diagnoses:   300.02 (F41.1) Generalized Anxiety Disorder    Collateral Reports Completed:   TC Collateral: [facility] electronic medical records reviewed.    PLAN: (Patient Tasks / Therapist Tasks / Other)  Next session:  4/1/24 @ 8:30am    Procedure Code: Psychotherapy (with patient) - 45 [CPT 85199]    JOSE MIGUEL WARREN                                                         Safety plan reviewed with pt, pt agrees to follow, safety plan made available to pt, safety plan at the bottom of this note, and in pts active mychart.   ____________________________________________________________________________________________________________________________  Treatment Plan     Patient's Name: Jose Lizama                YOB: 1990  Date of Creation: 2/26/24  Date Treatment Plan Last Reviewed/Revised: Initial     DSM5 Diagnoses: 300.02 (F41.1) Generalized Anxiety Disorder  Psychosocial / Contextual Factors: Pt with reported situational stressors.  PROMIS (reviewed every 90 days):      Referral / Collaboration:  N/A .     Anticipated number of session for this episode of care: 6-9 sessions  Anticipation frequency of session: Weekly  Anticipated Duration of each session: 38-52 minutes  Treatment plan will be reviewed in 90 days or when goals have been changed.         MeasurableTreatment Goal(s) related to diagnosis / functional impairment(s)  Goal 1: Patient will reduce overall frequency, intensity, and duration of the anxiety so that daily functioning is not impaired.       Objective #A                      Patient will describe situations, thoughts,  feelings, and actions associated with anxieties and worries, their impact on functioning, and attempts to resolve them.      Intervention(s)      LMHP will focus on developing a level of trust with the patient; provide support and empathy to encourage the client to feel safe in expressing their symptoms.       LMHP will ask the patient to describe their past experiences of anxiety and their impact on functioning: assess the focus, excessiveness, and uncontrollability of the worry and the type, frequency, intensity, and duration of their anxiety symptoms.       Goal 2: Patient will learn and implement coping skills that result in a reduction of anxiety and worry and improved daily functioning.       Objective A                        Patient will learn and implement calming skills to reduce overall anxiety and manage anxiety symptoms      Objective B      Patient will verbalize understanding of the role that cognitive biases play in excessive irrational worry and persistent anxiety symptoms.       Intervention(s)      LMHP will teach the patient calming/relaxation skills and how to differentiate between relaxation and tension while teaching the patient how to implement these strategies in their daily life.       LMHP will assist the patient in analyzing their worries by examining cognitive distortions and the patient s ability to control their emotions surrounding cognitive distortions..        Patient has reviewed and agreed to the above plan.        MARIN FROST                February 25, 2024     ___________________________________________________________________________________________________________________________  Patient has no change in safety concerns. Committed to safety and agreed to follow previously developed safety plan.  Reviewed safety plan with pt, pt agreed to follow, safety plan made available to pt, safety plan at the bottom of this note and in pts active mychart.     Safety Plan       If I  "am feeling unsafe or I am in a crisis, I will:   -Contact my established care providers   -Call the National Suicide Prevention Lifeline: [phone]   -Go to the nearest emergency room   -Call 911      Warning signs that I or other people might notice when a crisis is developing for me:  increased \"big feelings\"  -Decreased appetite,    -Decreased sleep   -Restlessness   -Increased thoughts about wanting to die   -Increased irritability or agitation.       Things I am able to do on my own to cope or help me feel better:    -take a time-out. Practice yoga, listen to music, meditate, get a massage, or learn relaxation techniques. Stepping back from the problem helps clear your head.   -Eat well-balanced meals. Do not skip any meals. Do keep healthful, energy-boosting snacks on hand.   -Limit alcohol and caffeine   -Get enough sleep. When stressed, your body needs additional sleep and rest.    -Exercise daily to help you feel good and maintain your health.   -Accept that you cannot control everything. Put your stress in perspective: Is it really as bad as you think?    -Welcome humor. A good laugh goes a long way.      Things that I am able to do with others to cope or help me better:    -Listen and talk about concerns   -Continue to follow with outpatient care providers and case management    -Go for a walk   -Distract with going out to eat, to a movie or a park.       Things I can use or do for distraction:    -Watch a TV show. If you don't have cable or a subscription service, many television networks offer free access, without a log-in, until you get closer to the most recent episodes.   -Watch a movie. Light-hearted comedy, drama to suck you in, or an old favorite - there are countless films to whisk you away for a bit.   -Sing. It doesn't matter if you're a professional vocalist or can't to carry a tune, singing engages a completely different part of your brain. Plus, the vibrations in your chest give great " sensory feedback and the vocalization reminds you of your voice.   -Watch cute videos on YouTube. About as low-effort as it gets: puppy/kassy videos, laugh challenges, or Vine compilations - take your pick.  -Mindless doodles/finger painting/playing with marcial. This may be especially helpful to those with child parts (DID/OSDD) who need an activity of their own.   -Grab a snack.   -Drum on a surface. Like singing, the vibrations and bilateral stimulation of your hands thumping will engage different parts of your mind and bring your attention away from what's intruding on you.   -Play a game or use a fun evan on your phone. Even if you aren't a , search the evan store. You might find one that speaks to you. It can be a great escape to get lost in for a bit.   -Video games. Any console, any game!   -Tear out words/photos/etc for a collage. Ask a local doctor's office or hairdresser for their spare magazines. Mindlessly rip out photos and words that speak to you. (Bonus: you may get to put tabloids to good use for once! They often have the scathing, overdramatic words that happen to be great for a therapeutic collages. Shocking! Betrayal! You Won't Believe It!).   -Discover new music. [employer], [place], -[place], so many ways to find new gems!   -Wash your face/hands or brush your teeth. A quick refresher can help you restart your day on a brand new page.   -Re-watch highlights from your favorite sport. It's easy to forget just how many epic, captivating moments there were once some time has passed. Relive your excitement. Plus, you already know how it ends, so you don't have to pay super close attention!   -Gratitude list. When your mind only wants to remind you of distressing things, focusing on 10+ things you're grateful for can really take you to a whole new atmosphere in your mind and heart.  -Imagery exercises. Containment exercises, healing pool/healing light, guided meditation, so many options!   -Play a board  game with a friend. Something simple like Sorry!, challenging like chess, or silly like Cards Against Humanity, there are lots of options to distract you in the company of friends.   -Card games. Solo works, too, if there's no one around.      Changes I can make to support my mental health and wellness:      1. Value yourself: Treat yourself with kindness and respect, and avoid self-criticism. Make time for your hobbies and favorite projects, or broaden your horizons. Do a daily crossword puzzle, plant a garden, take dance lessons, learn to play an instrument or become fluent in another language.      2. Take care of your body: Taking care of yourself physically can improve your mental health. Be sure to:   -Eat nutritious meals   -Avoid smoking and vaping-  -Drink plenty of water   -Exercise, which helps decrease depression and anxiety and improve moods   -Get enough sleep. Researchers believe that lack of sleep contributes to a high rate of depression in college students.       3. Surround yourself with good people: People with strong family or social connections are generally healthier than those who lack a support network. Make plans with supportive family members and friends, or seek out activities where you can meet new people, such as a club, class or support group.      4. Give yourself: Volunteer your time and energy to help someone else. You'll feel good about doing something tangible to help someone in need -- and it's a great way to meet new people. See Fun and Cheap Things to do in [place] for ideas.      5. Learn how to deal with stress: Like it or not, stress is a part of life. Practice good coping skills: Try One-Minute Stress Strategies, do Rehan Chi, exercise, take a nature walk, play with your pet or try journal writing as a stress reducer. Also, remember to smile and see the humor in life. Research shows that laughter can boost your immune system, ease pain, relax your body and reduce stress.     "  6. Quiet your mind: Try meditating, Mindfulness and/or prayer. Relaxation exercises and prayer can improve your state of mind and outlook on life. In fact, research shows that meditation may help you feel calm and enhance the effects of therapy. To get connected, see spiritual resources on Personal Well-being for Students      7. Set realistic goals: Decide what you want to achieve academically, professionally and personally, and write down the steps you need to realize your goals. Aim high, but be realistic and don't over-schedule. You'll enjoy a tremendous sense of accomplishment and self-worth as you progress toward your goal. Wellness Coaching, free to [employer] students, can help you develop goals and stay on track.       8. Break up the monotony: Although our routines make us more efficient and enhance our feelings of security and safety, a little change of pace can perk up a tedious schedule. Alter your jogging route, plan a road-trip, take a walk in a different park, hang some new pictures or try a new restaurant.      9. Avoid alcohol and other drugs: Keep alcohol use to a minimum and avoid other drugs. Sometimes people use alcohol and other drugs to \"self-medicate\" but in reality, alcohol and other drugs only aggravate problems.      10. Get help when you need it: Seeking help is a sign of strength -- not a weakness. And it is important to remember that treatment is effective. People who get appropriate care can recover from mental illness and addiction and lead full, rewarding lives.      People in my life that I can ask for help: parents, friends  -Spouse   -Friends   -Therapist    -Other Mental health Supportive Services      Your [facility] has a mental health crisis team you can call 24/7:    Phone: [facility] Crisis line:  [phone]     Other things that are important when I m in crisis for myself or others to do:     -Keep your voice calm   -Avoid overreacting   -Listen to the person   -Express " "support and concern   -Avoid continuous eye contact   -Ask how you can help   -Keep stimulation level low   -Move slowly   -Offer options instead of trying to take control   -Avoid touching the person unless you ask permission   -Be patient    -Gently announce actions before initiating them    -Give them space, don t make them feel  trapped   -Don t make judgmental comments   -Don t argue or try to reason with the person      Additional resources and information:       National [place] on Mental Illness (MARGOT) [place]: Connect for help, to navigate the mental health system, and for support and for resources. Call: 1-257-KESO-Helps / 8-019-680-4209      Crisis Text Line: The 24/7 emergency service is available if you or someone you know is experiencing a psychiatric or mental health crisis. Text: \"[place]\" to 529311      [place] Warmline: Are you an adult needing support? Talk to a specialist who has firsthand experience living with a mental health condition. Call: 837.297.7497. Text: \"Support\" to 00571      National Suicide Prevention Lifeline: The 24/7 lifeline provides support when in distress, has prevention and crisis resources for you or your loved ones, and resources for professionals.  Call 4-888-589-TALK (2378)      Peer Support Connection Warmlines: Yxdt-sj-pkwt telephone support that s safe and supportive. Open 5 p.m. to 9 a.m. Call or text: 1-776.319.6450       COVID Cares Stress Phone Support Service. Any [place] experiencing stress can call 875-YHCU8MN (720-133-0566) for free telephone support from 9am to 9pm every day. The service is a collaboration with volunteers from the [place] Psychiatric Society, the [place] Psychological Association, the [place] [employer], and Mental Health [place]. The free service is also accessible at [facility]org where searchers can also find psychiatric and mental health services availability and real-time Substance Use Disorder Treatment program " openings.      Mental Health Apps      My3  https://my3app.org/      VirtualHopeBox  https://Smackages.org/apps/virtual-hope-box/

## 2024-03-18 ENCOUNTER — VIRTUAL VISIT (OUTPATIENT)
Dept: BEHAVIORAL HEALTH | Facility: CLINIC | Age: 34
End: 2024-03-18
Payer: COMMERCIAL

## 2024-03-18 DIAGNOSIS — F41.1 GENERALIZED ANXIETY DISORDER: Primary | ICD-10-CM

## 2024-03-18 ASSESSMENT — PATIENT HEALTH QUESTIONNAIRE - PHQ9
SUM OF ALL RESPONSES TO PHQ QUESTIONS 1-9: 6
10. IF YOU CHECKED OFF ANY PROBLEMS, HOW DIFFICULT HAVE THESE PROBLEMS MADE IT FOR YOU TO DO YOUR WORK, TAKE CARE OF THINGS AT HOME, OR GET ALONG WITH OTHER PEOPLE: SOMEWHAT DIFFICULT
SUM OF ALL RESPONSES TO PHQ QUESTIONS 1-9: 6

## 2024-03-18 ASSESSMENT — COLUMBIA-SUICIDE SEVERITY RATING SCALE - C-SSRS
TOTAL  NUMBER OF ABORTED OR SELF INTERRUPTED ATTEMPTS SINCE LAST CONTACT: NO
1. SINCE LAST CONTACT, HAVE YOU WISHED YOU WERE DEAD OR WISHED YOU COULD GO TO SLEEP AND NOT WAKE UP?: NO
SUICIDE, SINCE LAST CONTACT: NO
6. HAVE YOU EVER DONE ANYTHING, STARTED TO DO ANYTHING, OR PREPARED TO DO ANYTHING TO END YOUR LIFE?: NO
TOTAL  NUMBER OF INTERRUPTED ATTEMPTS SINCE LAST CONTACT: NO
2. HAVE YOU ACTUALLY HAD ANY THOUGHTS OF KILLING YOURSELF?: NO
ATTEMPT SINCE LAST CONTACT: NO

## 2024-03-21 ENCOUNTER — MYC MEDICAL ADVICE (OUTPATIENT)
Dept: FAMILY MEDICINE | Facility: CLINIC | Age: 34
End: 2024-03-21
Payer: COMMERCIAL

## 2024-03-22 ENCOUNTER — VIRTUAL VISIT (OUTPATIENT)
Dept: FAMILY MEDICINE | Facility: CLINIC | Age: 34
End: 2024-03-22
Payer: COMMERCIAL

## 2024-03-22 ENCOUNTER — OFFICE VISIT (OUTPATIENT)
Dept: DERMATOLOGY | Facility: CLINIC | Age: 34
End: 2024-03-22
Payer: COMMERCIAL

## 2024-03-22 DIAGNOSIS — T17.208A FOREIGN BODY OF TONSIL, INITIAL ENCOUNTER: Primary | ICD-10-CM

## 2024-03-22 DIAGNOSIS — L30.1 DYSHIDROTIC ECZEMA: Primary | ICD-10-CM

## 2024-03-22 DIAGNOSIS — J03.80 ACUTE TONSILLITIS DUE TO OTHER SPECIFIED ORGANISMS: ICD-10-CM

## 2024-03-22 PROCEDURE — 99213 OFFICE O/P EST LOW 20 MIN: CPT | Mod: 95 | Performed by: NURSE PRACTITIONER

## 2024-03-22 PROCEDURE — 99203 OFFICE O/P NEW LOW 30 MIN: CPT | Performed by: PHYSICIAN ASSISTANT

## 2024-03-22 RX ORDER — TRIAMCINOLONE ACETONIDE 1 MG/G
OINTMENT TOPICAL
Qty: 30 G | Refills: 4 | Status: SHIPPED | OUTPATIENT
Start: 2024-03-22

## 2024-03-22 RX ORDER — CHLORHEXIDINE GLUCONATE ORAL RINSE 1.2 MG/ML
15 SOLUTION DENTAL 2 TIMES DAILY
Qty: 118 ML | Refills: 0 | Status: SHIPPED | OUTPATIENT
Start: 2024-03-22 | End: 2024-07-12

## 2024-03-22 ASSESSMENT — PAIN SCALES - GENERAL: PAINLEVEL: NO PAIN (0)

## 2024-03-22 NOTE — PROGRESS NOTES
[facility] Dermatology Note  Encounter Date: Mar 22, 2024  Office Visit     Reviewed patients past medical history and pertinent chart review prior to patients visit today.     Dermatology Problem List:  # Dyshidrotic eczema   - triamcinolone 0.1% ointment    Social history: Works as an ID nurse    ____________________________________________    Assessment & Plan:     # Dyshidrotic eczema   - Triamcinolone 0.1% ointment twice daily as needed for flares, up to two weeks to four weeks.  - Moisturize with a non-scented emollient throughout the day.   - Wet dressings: Once daily apply a thick layer of medication/ moisturizer to affected areas. Wet cotton gloves with warm water and wring out excess water. Apply the wet cloth to the skin over the medication/ moisturizer for 20- 60 minutes. Remove wet cloth and rub in excess medication/ moisturizer.   - We discussed the chronic nature of eczema. If the patient notices new or persistent flares I recommend follow up for re-evaluation.       All risks, benefits and alternatives were discussed with patient.  Patient is in agreement and understands the assessment and plan.  All questions were answered.  Cherry Johnson PA-C  [facility] Dermatology  _______________________________________    CC: Derm Problem (Jose is here today for possible dyshydrosis )    HPI:  Ms. Jose Lizama is a(n) 34 year old female who presents today as a new patient for itching and irritation involving the left 3rd finger. This was quite itchy and improved with hydrocortisone. She denies a personal history of eczema, but notes a family history of eczema for her mother. She occasionally wears gloves at work. Patient is otherwise feeling well, without additional skin concerns.      Physical Exam:  SKIN: Focused examination of left hand was performed.  - The base of the left 3rd finger, extending to the web spaces, is erythema with fine scale.     - No other lesions of  concern on areas examined.     Medications:  Current Outpatient Medications   Medication    albuterol (PROAIR HFA/PROVENTIL HFA/VENTOLIN HFA) 108 (90 Base) MCG/ACT inhaler    amoxicillin-clavulanate (AUGMENTIN) 875-125 MG tablet    chlorhexidine (PERIDEX) 0.12 % solution    cyclobenzaprine (FLEXERIL) 10 MG tablet    HALOETTE 0.12-0.015 MG/24HR vaginal ring     No current facility-administered medications for this visit.      Past Medical History:   Patient Active Problem List   Diagnosis    Moderate major depression (H)    CARDIOVASCULAR SCREENING; LDL GOAL LESS THAN 160    Anxiety    Fibroadenoma of breast, right    Marijuana smoker     Past Medical History:   Diagnosis Date    Depressive disorder        CC No referring provider defined for this encounter. on close of this encounter.

## 2024-03-22 NOTE — NURSING NOTE
Dermatology Rooming Note    Jose Lizama's goals for this visit include:   Chief Complaint   Patient presents with    Derm Problem     Jose is here today for possible dyshydrosis      MCKENZIE Louis

## 2024-03-22 NOTE — LETTER
3/22/2024       RE: Jose Lizama  [address]  [place] [address]     Dear Colleague,    Thank you for referring your patient, Jose Lizama, to the [facility] [place] at [facility]. Please see a copy of my visit note below.    [facility] Dermatology Note  Encounter Date: Mar 22, 2024  Office Visit     Reviewed patients past medical history and pertinent chart review prior to patients visit today.     Dermatology Problem List:  # Dyshidrotic eczema   - triamcinolone 0.1% ointment    Social history: Works as an ID nurse    ____________________________________________    Assessment & Plan:     # Dyshidrotic eczema   - Triamcinolone 0.1% ointment twice daily as needed for flares, up to two weeks to four weeks.  - Moisturize with a non-scented emollient throughout the day.   - Wet dressings: Once daily apply a thick layer of medication/ moisturizer to affected areas. Wet cotton gloves with warm water and wring out excess water. Apply the wet cloth to the skin over the medication/ moisturizer for 20- 60 minutes. Remove wet cloth and rub in excess medication/ moisturizer.   - We discussed the chronic nature of eczema. If the patient notices new or persistent flares I recommend follow up for re-evaluation.       All risks, benefits and alternatives were discussed with patient.  Patient is in agreement and understands the assessment and plan.  All questions were answered.  Cherry Johnson PA-C  [facility] Dermatology  _______________________________________    CC: Derm Problem (Jose is here today for possible dyshydrosis )    HPI:  Ms. Jose Lizama is a(n) 34 year old female who presents today as a new patient for itching and irritation involving the left 3rd finger. This was quite itchy and improved with hydrocortisone. She denies a personal history of eczema, but notes a family history of  eczema for her mother. She occasionally wears gloves at work. Patient is otherwise feeling well, without additional skin concerns.      Physical Exam:  SKIN: Focused examination of left hand was performed.  - The base of the left 3rd finger, extending to the web spaces, is erythema with fine scale.     - No other lesions of concern on areas examined.     Medications:  Current Outpatient Medications   Medication    albuterol (PROAIR HFA/PROVENTIL HFA/VENTOLIN HFA) 108 (90 Base) MCG/ACT inhaler    amoxicillin-clavulanate (AUGMENTIN) 875-125 MG tablet    chlorhexidine (PERIDEX) 0.12 % solution    cyclobenzaprine (FLEXERIL) 10 MG tablet    HALOETTE 0.12-0.015 MG/24HR vaginal ring     No current facility-administered medications for this visit.      Past Medical History:   Patient Active Problem List   Diagnosis    Moderate major depression (H)    CARDIOVASCULAR SCREENING; LDL GOAL LESS THAN 160    Anxiety    Fibroadenoma of breast, right    Marijuana smoker     Past Medical History:   Diagnosis Date    Depressive disorder        CC No referring provider defined for this encounter. on close of this encounter.

## 2024-03-22 NOTE — TELEPHONE ENCOUNTER
RN huddled with provider in clinic    Provider said that she could just monitor for signs of infection and go to ED/UC if present. If she wants, she could do a virtual visit to possibly discuss a mouth wash to prevent infection    RN called patient and scheduled her virtual visit for today.     Lyndsey Pacheco RN

## 2024-03-22 NOTE — PROGRESS NOTES
"Jose is a 34 year old who is being evaluated via a billable video visit.    How would you like to obtain your AVS? [employer]t  If the video visit is dropped, the invitation should be resent by: Text to cell phone: [phone]  Will anyone else be joining your video visit? No      Assessment & Plan     Foreign body of tonsil, initial encounter  Symptoms are improving, but she still has what she thinks is a piece of food stuck in her left tonsil. Continue salt water gargles. Peridex prescribed to help prevent infection. Since she is going out of town, augmentin prescribed for her to take with her, but not to start taking unless the tonsil becomes more painful, swells and gets red. If that happens, she is to send me a [employer] message and I'll order an ENT referral for when she returns home.     - chlorhexidine (PERIDEX) 0.12 % solution; Swish and spit 15 mLs in mouth 2 times daily  - amoxicillin-clavulanate (AUGMENTIN) 875-125 MG tablet; Take 1 tablet by mouth 2 times daily for 7 days    Acute tonsillitis due to other specified organisms  See above.     - chlorhexidine (PERIDEX) 0.12 % solution; Swish and spit 15 mLs in mouth 2 times daily  - amoxicillin-clavulanate (AUGMENTIN) 875-125 MG tablet; Take 1 tablet by mouth 2 times daily for 7 days    Prescription drug management        BMI  Estimated body mass index is 26.9 kg/m  as calculated from the following:    Height as of 1/29/24: 1.555 m (5' 1.22\").    Weight as of 2/12/24: 65 kg (143 lb 6.4 oz).         Patient Instructions   Tonsil irritation:  It doesn't appear infected at this time, but I would recommend you continue salt water gargles and start using the prescription mouth wash to prevent infection.   Since you are going out of town for almost a week, I have also sent an antibiotic to the pharmacy. You can pick that up and take with you. If tonsil becomes more painful, inflamed and red, go ahead and start the antibiotic.   If you do start the antibiotic, " please send me a [employer] message so that I can place an ENT referral for when you get back in town.   Take antibiotics as prescribed for the full course.  Take a probiotic and/or eat yogurt daily while on antibiotics and ~1 week after to prevent GI upset.  Continue to use OTC medications for symptom relief.   Rest and stay hydrated.  Use a humidifier in the bedroom, warm compresses on the face, and steam inhalation.  If symptoms worsen or do not improve within 1 week, please follow-up in clinic.     Vita Garcia is a 34 year old, presenting for the following health issues:  tonsils         3/22/2024    10:44 AM   Additional Questions   Roomed by Tsering   Accompanied by none         3/22/2024    10:44 AM   Patient Reported Additional Medications   Patient reports taking the following new medications hydroxyzine 25 mg prn     HPI     Acute Illness  Acute illness concerns: mild throat pain, slightly swollen, patient has a piece of food lodged into one of her tonsils  Onset/Duration: noticed the irritation yesterday   Symptoms:  Fever: No  Chills/Sweats: No  Headache (location?): No  Sinus Pressure: No  Conjunctivitis:  No  Ear Pain: no  Rhinorrhea: No  Congestion: No  Sore Throat: YES- slightly sore, was worse yesterday, feeling some better today   Cough: no  Wheeze: No  Decreased Appetite: No  Nausea: No  Vomiting: No  Diarrhea: No  Dysuria/Freq.: No  Dysuria or Hematuria: No  Fatigue/Achiness: No  Sick/Strep Exposure: No  Therapies tried and outcome: coughing it out, gargled with salt water     Additional provider notes: Patient presents virtually via video visit for tonsil concern. She states she noticed a piece of food lodged in her left tonsil yesterday. She has been gargling and trying to cough it out. Symptoms improving today. Yesterday felt like a thorn. Today, she can sense it, but it doesn't hurt as bad to swallow. She thinks her saliva is helping to break down the food. She is traveling out of town  "over the weekend and is worried about infection or that it won't come out completely.    She works for infectious disease as an RN.     Allergies   Allergen Reactions    No Known Drug Allergy        Current Outpatient Medications   Medication    albuterol (PROAIR HFA/PROVENTIL HFA/VENTOLIN HFA) 108 (90 Base) MCG/ACT inhaler    cyclobenzaprine (FLEXERIL) 10 MG tablet    HALOETTE 0.12-0.015 MG/24HR vaginal ring     No current facility-administered medications for this visit.       Past Medical History:   Diagnosis Date    Depressive disorder             Review of Systems  Constitutional, HEENT, cardiovascular, pulmonary, gi and gu systems are negative, except as otherwise noted.      Objective    Vitals - Patient Reported  Weight (Patient Reported): 64.4 kg (142 lb)  Height (Patient Reported): 155.5 cm (5' 1.22\")  BMI (Based on Pt Reported Ht/Wt): 26.64  Pain Score: No Pain (1)  Pain Loc: Throat        Physical Exam   GENERAL: alert and no distress  EYES: Eyes grossly normal to inspection.  No discharge or erythema, or obvious scleral/conjunctival abnormalities.  RESP: No audible wheeze, cough, or visible cyanosis.    SKIN: Visible skin clear. No significant rash, abnormal pigmentation or lesions.  NEURO: Cranial nerves grossly intact.  Mentation and speech appropriate for age.  PSYCH: Appropriate affect, tone, and pace of words                Video-Visit Details    Type of service:  Video Visit   *7 minutes    Originating Location (pt. Location): Home    Distant Location (provider location):  On-site  Platform used for Video Visit: [employer]  Signed Electronically by: Grecia Feng DNP    "

## 2024-03-22 NOTE — PATIENT INSTRUCTIONS
Tonsil irritation:  It doesn't appear infected at this time, but I would recommend you continue salt water gargles and start using the prescription mouth wash to prevent infection.   Since you are going out of town for almost a week, I have also sent an antibiotic to the pharmacy. You can pick that up and take with you. If tonsil becomes more painful, inflamed and red, go ahead and start the antibiotic.   If you do start the antibiotic, please send me a [employer] message so that I can place an ENT referral for when you get back in town.   Take antibiotics as prescribed for the full course.  Take a probiotic and/or eat yogurt daily while on antibiotics and ~1 week after to prevent GI upset.  Continue to use OTC medications for symptom relief.   Rest and stay hydrated.  Use a humidifier in the bedroom, warm compresses on the face, and steam inhalation.  If symptoms worsen or do not improve within 1 week, please follow-up in clinic.

## 2024-04-01 ENCOUNTER — THERAPY VISIT (OUTPATIENT)
Dept: PHYSICAL THERAPY | Facility: CLINIC | Age: 34
End: 2024-04-01
Attending: OBSTETRICS & GYNECOLOGY
Payer: COMMERCIAL

## 2024-04-01 ENCOUNTER — VIRTUAL VISIT (OUTPATIENT)
Dept: BEHAVIORAL HEALTH | Facility: CLINIC | Age: 34
End: 2024-04-01
Payer: COMMERCIAL

## 2024-04-01 DIAGNOSIS — M79.18 MYALGIA OF PELVIC FLOOR: ICD-10-CM

## 2024-04-01 DIAGNOSIS — F41.1 GENERALIZED ANXIETY DISORDER: Primary | ICD-10-CM

## 2024-04-01 PROCEDURE — 97161 PT EVAL LOW COMPLEX 20 MIN: CPT | Mod: GP | Performed by: PHYSICAL THERAPIST

## 2024-04-01 PROCEDURE — 97530 THERAPEUTIC ACTIVITIES: CPT | Mod: GP | Performed by: PHYSICAL THERAPIST

## 2024-04-01 PROCEDURE — 97110 THERAPEUTIC EXERCISES: CPT | Mod: GP | Performed by: PHYSICAL THERAPIST

## 2024-04-01 PROCEDURE — 2894A VOIDCORRECT: CPT | Mod: GP

## 2024-04-01 ASSESSMENT — COLUMBIA-SUICIDE SEVERITY RATING SCALE - C-SSRS
ATTEMPT SINCE LAST CONTACT: NO
1. SINCE LAST CONTACT, HAVE YOU WISHED YOU WERE DEAD OR WISHED YOU COULD GO TO SLEEP AND NOT WAKE UP?: NO
TOTAL  NUMBER OF ABORTED OR SELF INTERRUPTED ATTEMPTS SINCE LAST CONTACT: NO
TOTAL  NUMBER OF INTERRUPTED ATTEMPTS SINCE LAST CONTACT: NO
SUICIDE, SINCE LAST CONTACT: NO
2. HAVE YOU ACTUALLY HAD ANY THOUGHTS OF KILLING YOURSELF?: NO
6. HAVE YOU EVER DONE ANYTHING, STARTED TO DO ANYTHING, OR PREPARED TO DO ANYTHING TO END YOUR LIFE?: NO

## 2024-04-01 NOTE — PROGRESS NOTES
Transition Clinic Progress Note    Patient Name:   Jose Lizama Date: 2024          Service Type: Individual        VISIT TIME START: 830      VISIT TIME END: 915      Session Length:  TC Session Length: 45 (38-52 Minutes)    Session #:  7    Attendees: TC Attendees: Client alone    Service Modality:  Service Modality: Video Visit:      Provider verified identity through the following two step process.  Patient provided:  Patient  and Patient address    Telemedicine Visit: The patient's condition can be safely assessed and treated via synchronous audio and visual telemedicine encounter.      Reason for Telemedicine Visit: Patient convenience (e.g. access to timely appointments / distance to available provider)    Originating Site (Patient Location): Patient's home    Distant Site (Provider Location): [facility]    Consent:  The patient/guardian has verbally consented to: the potential risks and benefits of telemedicine (video visit) versus in person care; bill my insurance or make self-payment for services provided; and responsibility for payment of non-covered services.     Patient would like the video invitation sent by:  My Chart    Mode of Communication:  Video Conference via [facility]    Distant Location (Provider):  Off-site    As the provider I attest to compliance with applicable laws and regulations related to telemedicine.    Informed Consent and Assessment Methods    This provider and patient discussed HIPAA, and the limits of confidentiality; including mandated reporting, the possibility of collaborative discussions with patient's primary care provider and the multidisciplinary team in the MH clinic during consultation.  Discussed the no show policy, and the benefits and possible unintended consequences of therapy.  Patient indicated understanding Transition Clinic services are short term, solution focused, until a referral can be  made and patient can bridge to long term therapy.  Patient verbally indicated understanding the informed consent.        DATA  Interactive Complexity: No  Crisis: No        Progress Since Last Session (Related to Symptoms / Goals / Homework):   Symptoms: Improving pt reports improving sxs.  Homework: Completed in session      Episode of Care Goals: Satisfactory progress - ACTION (Actively working towards change); Intervened by reinforcing change plan / affirming steps taken     Current / Ongoing Stressors and Concerns:   Pt reports going on Spring break this past week to [place] with her partner, reports it went well, rock picked, hiking, reports feeling refreshed.  Pt denies any excessive anxiety or depression sxs today.  Pt denies SI, HI, AH/VH.  Pt reports she is hopeful, looking forward to soul work bookwork she ordered.  Pt reports the following protective factors:  supportive family, willingness to engage in therapy, hope for the future, attached to family/friends, embedded in a protective network, demonstrates reliability to personal/professional life.   Pt reports ordering shadow work and soul work self help books.  Pt processed regarding her aunts medical condition, healing and steps forward.   Pt and writer explored some mindfulness with focus on present moment and dbt work.  Pt appeared engaged and receptive to the above information.    Plan:  4/8/24 @ 3;30pm            Treatment Objective(s) Addressed in This Session:   use distraction each time intrusive worry surfaces  Encourage self-care  Encourage distress tolerance     Intervention:      Brief Psychotherapy - discussed and prioritizing the most efficient treatment., Cognitive Behavioral Therapy (CBT) - Discussed changing thoughts is the path to changing behaviors and feelings., Motivational Interviewing - helping to find the motivation to make positive behavior changes., and Person-Centered Therapy - Actualizes potential and moves towards increased  awareness, spontaneity, trust in self and inner directedness.    Assessments completed prior to visit:    [place] Suicide Severity Rating Scale (Short Version)      2/12/2024     2:00 PM 2/26/2024     9:00 AM 3/4/2024     9:00 AM 3/11/2024     3:00 PM 3/18/2024     9:00 AM 4/1/2024     9:00 AM   [place] Suicide Severity Rating (Short Version)   1. Wish to be Dead (Since Last Contact) N N N N N N   2. Non-Specific Active Suicidal Thoughts (Since Last Contact) N N N N N N   Actual Attempt (Since Last Contact) N N N N N N   Has subject engaged in non-suicidal self-injurious behavior? (Since Last Contact) N N N N N N   Interrupted Attempts (Since Last Contact) N N N N N N   Aborted or Self-Interrupted Attempt (Since Last Contact) N N N N N N   Preparatory Acts or Behavior (Since Last Contact) N N N N N N   Suicide (Since Last Contact) N N N N N N   Calculated C-SSRS Risk Score (Since Last Contact) No Risk Indicated No Risk Indicated No Risk Indicated No Risk Indicated No Risk Indicated No Risk Indicated           ASSESSMENT: Current Emotional / Mental Status (status of significant symptoms):   Risk status (Self / Other harm or suicidal ideation)   Patient denies current fears or concerns for personal safety.   Patient denies current or recent suicidal ideation or behaviors.   Patient denies current or recent homicidal ideation or behaviors.   Patient denies current or recent self injurious behavior or ideation.   Patient denies other safety concerns.   Patient reports there has been no change in risk factors since their last session.     Patient reports there has been no change in protective factors since their last session.     Recommended that patient call 911 or go to the local ED should there be a change in any of these risk factors.     Appearance:   Appropriate    Eye Contact:   Good    Psychomotor Behavior: Normal    Attitude:   Cooperative    Orientation:   All   Speech    Rate / Production: Normal      Volume:  Normal    Mood:    Normal   Affect:    Appropriate    Thought Content:  Clear    Thought Form:  Coherent  Logical    Insight:    Good      Medication Review:   No current psychiatric medications prescribed     Medication Compliance:   NA     Changes in Health Issues:   None reported     Chemical Use Review:   Substance Use: Chemical use reviewed, no active concerns identified      Tobacco Use: No current tobacco use.      Diagnoses:   300.02 (F41.1) Generalized Anxiety Disorder    Collateral Reports Completed:    Collateral: [facility] electronic medical records reviewed.    PLAN: (Patient Tasks / Therapist Tasks / Other)    Encourage self-care    Plan:  4/8/24 @ 3:30pm    Procedure Code: Psychotherapy (with patient) - 45 [CPT 43520]      JOSE MIGUEL WARREN          Safety plan reviewed with pt, pt agrees to follow, safety plan made available to pt, safety plan at the bottom of this note, and in pts active mychart.   ____________________________________________________________________________________________________________________________  Treatment Plan     Patient's Name: Jose Lizama                YOB: 1990  Date of Creation: 2/26/24  Date Treatment Plan Last Reviewed/Revised: Initial     DSM5 Diagnoses: 300.02 (F41.1) Generalized Anxiety Disorder  Psychosocial / Contextual Factors: Pt with reported situational stressors.  PROMIS (reviewed every 90 days):      Referral / Collaboration:  N/A .     Anticipated number of session for this episode of care: 6-9 sessions  Anticipation frequency of session: Weekly  Anticipated Duration of each session: 38-52 minutes  Treatment plan will be reviewed in 90 days or when goals have been changed.         MeasurableTreatment Goal(s) related to diagnosis / functional impairment(s)  Goal 1: Patient will reduce overall frequency, intensity, and duration of the anxiety so that daily functioning is not impaired.       Objective #A                       Patient will describe situations, thoughts, feelings, and actions associated with anxieties and worries, their impact on functioning, and attempts to resolve them.      Intervention(s)      LMHP will focus on developing a level of trust with the patient; provide support and empathy to encourage the client to feel safe in expressing their symptoms.       LMHP will ask the patient to describe their past experiences of anxiety and their impact on functioning: assess the focus, excessiveness, and uncontrollability of the worry and the type, frequency, intensity, and duration of their anxiety symptoms.       Goal 2: Patient will learn and implement coping skills that result in a reduction of anxiety and worry and improved daily functioning.       Objective A                        Patient will learn and implement calming skills to reduce overall anxiety and manage anxiety symptoms      Objective B      Patient will verbalize understanding of the role that cognitive biases play in excessive irrational worry and persistent anxiety symptoms.       Intervention(s)      LMHP will teach the patient calming/relaxation skills and how to differentiate between relaxation and tension while teaching the patient how to implement these strategies in their daily life.       LMHP will assist the patient in analyzing their worries by examining cognitive distortions and the patient s ability to control their emotions surrounding cognitive distortions..        Patient has reviewed and agreed to the above plan.        MARIN FROST                February 25, 2024     ___________________________________________________________________________________________________________________________  Patient has no change in safety concerns. Committed to safety and agreed to follow previously developed safety plan.  Reviewed safety plan with pt, pt agreed to follow, safety plan made available to pt, safety plan at the bottom of this  "note and in pts active mychart.     Safety Plan       If I am feeling unsafe or I am in a crisis, I will:   -Contact my established care providers   -Call the National Suicide Prevention Lifeline: 771.269.7902   -Go to the nearest emergency room   -Call 911      Warning signs that I or other people might notice when a crisis is developing for me:  increased \"big feelings\"  -Decreased appetite,    -Decreased sleep   -Restlessness   -Increased thoughts about wanting to die   -Increased irritability or agitation.       Things I am able to do on my own to cope or help me feel better:    -take a time-out. Practice yoga, listen to music, meditate, get a massage, or learn relaxation techniques. Stepping back from the problem helps clear your head.   -Eat well-balanced meals. Do not skip any meals. Do keep healthful, energy-boosting snacks on hand.   -Limit alcohol and caffeine   -Get enough sleep. When stressed, your body needs additional sleep and rest.    -Exercise daily to help you feel good and maintain your health.   -Accept that you cannot control everything. Put your stress in perspective: Is it really as bad as you think?    -Welcome humor. A good laugh goes a long way.      Things that I am able to do with others to cope or help me better:    -Listen and talk about concerns   -Continue to follow with outpatient care providers and case management    -Go for a walk   -Distract with going out to eat, to a movie or a park.       Things I can use or do for distraction:    -Watch a TV show. If you don't have cable or a subscription service, many television networks offer free access, without a log-in, until you get closer to the most recent episodes.   -Watch a movie. Light-hearted comedy, drama to suck you in, or an old favorite - there are countless films to whisk you away for a bit.   -Sing. It doesn't matter if you're a professional vocalist or can't to carry a tune, singing engages a completely different part of " your brain. Plus, the vibrations in your chest give great sensory feedback and the vocalization reminds you of your voice.   -Watch cute videos on YouTube. About as low-effort as it gets: puppy/kassy videos, laugh challenges, or Vine compilations - take your pick.  -Mindless doodles/finger painting/playing with marcial. This may be especially helpful to those with child parts (DID/OSDD) who need an activity of their own.   -Grab a snack.   -Drum on a surface. Like singing, the vibrations and bilateral stimulation of your hands thumping will engage different parts of your mind and bring your attention away from what's intruding on you.   -Play a game or use a fun evan on your phone. Even if you aren't a , search the evan store. You might find one that speaks to you. It can be a great escape to get lost in for a bit.   -Video games. Any console, any game!   -Tear out words/photos/etc for a collage. Ask a local doctor's office or hairdresser for their spare magazines. Mindlessly rip out photos and words that speak to you. (Bonus: you may get to put tabloids to good use for once! They often have the scathing, overdramatic words that happen to be great for a therapeutic collages. Shocking! Betrayal! You Won't Believe It!).   -Discover new music. [employer], [employer], -[place], so many ways to find new gems!   -Wash your face/hands or brush your teeth. A quick refresher can help you restart your day on a brand new page.   -Re-watch highlights from your favorite sport. It's easy to forget just how many epic, captivating moments there were once some time has passed. Relive your excitement. Plus, you already know how it ends, so you don't have to pay super close attention!   -Gratitude list. When your mind only wants to remind you of distressing things, focusing on 10+ things you're grateful for can really take you to a whole new atmosphere in your mind and heart.  -Imagery exercises. Containment exercises, healing pool/healing  light, guided meditation, so many options!   -Play a board game with a friend. Something simple like Sorry!, challenging like chess, or silly like Cards Against Humanity, there are lots of options to distract you in the company of friends.   -Card games. Solo works, too, if there's no one around.      Changes I can make to support my mental health and wellness:      1. Value yourself: Treat yourself with kindness and respect, and avoid self-criticism. Make time for your hobbies and favorite projects, or broaden your horizons. Do a daily crossword puzzle, plant a garden, take dance lessons, learn to play an instrument or become fluent in another language.      2. Take care of your body: Taking care of yourself physically can improve your mental health. Be sure to:   -Eat nutritious meals   -Avoid smoking and vaping-  -Drink plenty of water   -Exercise, which helps decrease depression and anxiety and improve moods   -Get enough sleep. Researchers believe that lack of sleep contributes to a high rate of depression in college students.       3. Surround yourself with good people: People with strong family or social connections are generally healthier than those who lack a support network. Make plans with supportive family members and friends, or seek out activities where you can meet new people, such as a club, class or support group.      4. Give yourself: Volunteer your time and energy to help someone else. You'll feel good about doing something tangible to help someone in need -- and it's a great way to meet new people. See Fun and Cheap Things to do in [place] for ideas.      5. Learn how to deal with stress: Like it or not, stress is a part of life. Practice good coping skills: Try One-Minute Stress Strategies, do Rehan Chi, exercise, take a nature walk, play with your pet or try journal writing as a stress reducer. Also, remember to smile and see the humor in life. Research shows that laughter can boost your  "immune system, ease pain, relax your body and reduce stress.      6. Quiet your mind: Try meditating, Mindfulness and/or prayer. Relaxation exercises and prayer can improve your state of mind and outlook on life. In fact, research shows that meditation may help you feel calm and enhance the effects of therapy. To get connected, see spiritual resources on Personal Well-being for Students      7. Set realistic goals: Decide what you want to achieve academically, professionally and personally, and write down the steps you need to realize your goals. Aim high, but be realistic and don't over-schedule. You'll enjoy a tremendous sense of accomplishment and self-worth as you progress toward your goal. Wellness Coaching, free to [employer] students, can help you develop goals and stay on track.       8. Break up the monotony: Although our routines make us more efficient and enhance our feelings of security and safety, a little change of pace can perk up a tedious schedule. Alter your jogging route, plan a road-trip, take a walk in a different park, hang some new pictures or try a new restaurant.      9. Avoid alcohol and other drugs: Keep alcohol use to a minimum and avoid other drugs. Sometimes people use alcohol and other drugs to \"self-medicate\" but in reality, alcohol and other drugs only aggravate problems.      10. Get help when you need it: Seeking help is a sign of strength -- not a weakness. And it is important to remember that treatment is effective. People who get appropriate care can recover from mental illness and addiction and lead full, rewarding lives.      People in my life that I can ask for help: parents, friends  -Spouse   -Friends   -Therapist    -Other Mental health Supportive Services      Your [facility] has a mental health crisis team you can call 24/7:    Phone: [facility] Crisis line:  [phone]     Other things that are important when I m in crisis for myself or others to do:     -Keep your " "voice calm   -Avoid overreacting   -Listen to the person   -Express support and concern   -Avoid continuous eye contact   -Ask how you can help   -Keep stimulation level low   -Move slowly   -Offer options instead of trying to take control   -Avoid touching the person unless you ask permission   -Be patient    -Gently announce actions before initiating them    -Give them space, don t make them feel  trapped   -Don t make judgmental comments   -Don t argue or try to reason with the person      Additional resources and information:       National [place] [employer] Mental [employer]ness (MARGOT) [place]: Connect for help, to navigate the mental health system, and for support and for resources. Call: 5-215-IPNE-Helps / 3-926-569-9830      Crisis Text Line: The 24/7 emergency service is available if you or someone you know is experiencing a psychiatric or mental health crisis. Text: \"[place]\" to 972448      [place] Warmline: Are you an adult needing support? Talk to a specialist who has firsthand experience living with a mental health condition. Call: 730.993.7858. Text: \"Support\" to 61043      National Suicide Prevention Lifeline: The 24/7 lifeline provides support when in distress, has prevention and crisis resources for you or your loved ones, and resources for professionals.  Call 7-184-578-TALK (4683)      Peer Support Connection Warmlines: Wbbp-ee-ngll telephone support that s safe and supportive. Open 5 p.m. to 9 a.m. Call or text: 1-831.513.2659       COVID [facility] Stress Phone Support Service. Any [place] experiencing stress can call 936-VPBD2MN (676-494-0742) for free telephone support from 9am to 9pm every day. The service is a collaboration with volunteers from the [place] [employer], the [place] Psychological Association, the [place] [employer], and Mental [facility] [place]. The free service is also accessible at [email or web address] where searchers can also find psychiatric and mental health services " availability and real-time Substance Use Disorder Treatment program openings.      Mental Health Apps      My3  [email or web address]      VirtualHopeBox  [email or web address]

## 2024-04-01 NOTE — PROGRESS NOTES
PHYSICAL THERAPY EVALUATION  Type of Visit: Evaluation    See electronic medical record for Abuse and Falls Screening details.    Subjective     Pt presents with pain with vaginal penetration. Pt states that she feels a lot of tension in legs throughout the day, L leg especially. Pt attributes this to having short legs that do not touch the floor in her desk chair. Pt feels tightness in her inner and back of thighs. Pt does not experience urinary leaking but feels like she may be close to leaking with coughing and sneezing. Pt feels the need to urinate during intercourse if patient does not have an empty bladder and patient is on top. Pt noticed leaking that was not associated with orgasm without feeling the need to urinate. Pt cannot do in and out intercourse, only grinding-type motion due to anterior wall sensitivity.        Presenting condition or subjective complaint: Painful intercourse, worried about peeing when coughing  Date of onset: 02/12/24    Relevant medical history: Depression; Smoking   Dates & types of surgery:      Prior diagnostic imaging/testing results:       Prior therapy history for the same diagnosis, illness or injury: No      Living Environment  Social support: With a significant other or spouse     Employment: Yes    Hobbies/Interests: Playing music, being outside    Patient goals for therapy: Not feel pain intercourse    Pain assessment: Pain present     Objective      PELVIC EVALUATION  ADDITIONAL HISTORY:  Sex assigned at birth: Female  Gender identity: Female    Pronouns: She/Her Hers      Bladder History:  Feels bladder filling: No  Triggers for feeling of inability to wait to go to the bathroom: No    How long can you wait to urinate: depends  Gets up at night to urinate: Yes 1x per night  Can stop the flow of urine when urinating: Yes  Volume of urine usually released: Medium   Other issues:   Increased frequency, 1x per hour.   Number of bladder infections in last 12 months:     Fluid intake per day: 60 6    Medications taken for bladder: No     Activities causing urine leak: Cough; Other activities During intercourse  Amount of urine typically leaked: Small amount  Pads used to help with leaking: No        Bowel History:  Frequency of bowel movement: 2-4x daily  Consistency of stool:    Type 4-5 are most often   Ignores the urge to defecate:   Yes  Other bowel issues:  Some occasional notes of blood in stool, tries not to strain.   Length of time spent trying to have a bowel movement:   Couple minutes.     Sexual Function History:  Sexual orientation:     Straight   Sexually active:   Yes,   Lubrication used:     Sometimes, does not feel   Pelvic pain:     Only during intercourse, pt feels pain with inserting and taking out NuvaRing. Pt has not had a very heavy cycle so not using tampons.   Pain or difficulty with orgasms/erection/ejaculation:    No pain.   State of menopause:     Hormone medications:             Discussed reason for referral regarding pelvic health needs and external/internal pelvic floor muscle examination with patient/guardian.  Opportunity provided to ask questions and verbal consent for assessment and intervention was given.    PAIN:   Pain Level with Use: 5/10  Pain Location: anterior wall of vagina   Pain Quality: sharp  Pain Frequency: always with penetration  POSTURE: Sitting Posture: Rounding shoulders, legs dangling   HIP SCREEN:  Strength:   Pain: - none + mild ++ moderate +++ severe  Strength Scale: 0-5/5 Left Right   Hip Flexion 5- 5-   Hip Extension 4+ 4+   Hip Abduction 5 5   Hip Internal Rotation 5 5   Hip External Rotation 4+ 5-   FLEXIBILITY: Decreased flexibility noted at hamstrings and adductors with tenderness    PELVIC EXAM  External Visual Inspection:  At rest: Normal  With voluntary pelvic floor contraction: Perineal elevation  Relaxation of PFM: Yes, Partial/delayed relaxation  With intra-abdominal pressure: Not assessed today.      Integumentary:   Introitus: Unremarkable    External Digital Palpation per Perineum:   Ischiocavernosis: Unremarkable  Bulbo cavernosis: Unremarkable  Transverse perineal: Tightness, Tenderness  Levator ani: Tightness    Internal Digital Palpation:  Per Vagina:  Tenderness  Myofascial Resistance to Palpation: Increased tension noted at superficial layer and levator ani/OI bilaterally L>R   Digital Muscle Performance: P (Power): 3/5  E (Endurance): 5 seconds  F (Fast Twitch): 10/10   Compensations: Breath holding   Relaxation Post-Contraction: Partial/delayed relaxation    ABDOMINAL ASSESSMENT    Fascial Tension/Restriction: WNL      Assessment & Plan   CLINICAL IMPRESSIONS  Medical Diagnosis: Myalgia of the pelvic floor    Treatment Diagnosis: Pelvic floor dysfunction   Impression/Assessment: Patient is a 34 year old female with pelvic floor dysfunction complaints.  The following significant findings have been identified: Pain, Decreased ROM/flexibility, Decreased strength, Impaired muscle performance, and Decreased activity tolerance. These impairments interfere with their ability to perform self care tasks, work tasks, and recreational activities as compared to previous level of function.     Clinical Decision Making (Complexity):  Clinical Presentation: Stable/Uncomplicated  Clinical Presentation Rationale: based on medical and personal factors listed in PT evaluation  Clinical Decision Making (Complexity): Low complexity    PLAN OF CARE  Treatment Interventions:  Interventions: Manual Therapy, Neuromuscular Re-education, Therapeutic Activity, Therapeutic Exercise    Long Term Goals     PT Goal 1  Goal Identifier: [place]  Goal Description: Pt will be able to have vaginal penetrative intercourse with 0/10 pain.  Rationale: to maximize safety and independence with performance of ADLs and functional tasks  Target Date: 06/30/24      Frequency of Treatment: 1x per week, tapering to 2x per month  Duration of  Treatment: 3 months    Education Assessment:   Learner/Method: Patient;No Barriers to Learning    Risks and benefits of evaluation/treatment have been explained.   Patient/Family/caregiver agrees with Plan of Care.     Evaluation Time:     PT Eval, Low Complexity Minutes (56057): 35     Signing Clinician: Indira Horner PT

## 2024-04-04 ENCOUNTER — MYC REFILL (OUTPATIENT)
Dept: FAMILY MEDICINE | Facility: CLINIC | Age: 34
End: 2024-04-04
Payer: COMMERCIAL

## 2024-04-04 RX ORDER — ETONOGESTREL AND ETHINYL ESTRADIOL .015; .12 MG/D; MG/D
INSERT, EXTENDED RELEASE VAGINAL
OUTPATIENT
Start: 2024-04-04

## 2024-04-04 NOTE — TELEPHONE ENCOUNTER
Attempt #1 to call patient.     RN left voicemail and requested return call to [facility] at [phone].     Lyndsey Pacheco RN, BSN  [facility]: [place]

## 2024-04-05 ENCOUNTER — MYC MEDICAL ADVICE (OUTPATIENT)
Dept: OBGYN | Facility: CLINIC | Age: 34
End: 2024-04-05
Payer: COMMERCIAL

## 2024-04-05 DIAGNOSIS — Z30.44 ENCOUNTER FOR SURVEILLANCE OF VAGINAL RING HORMONAL CONTRACEPTIVE DEVICE: Primary | ICD-10-CM

## 2024-04-05 RX ORDER — ETONOGESTREL AND ETHINYL ESTRADIOL VAGINAL RING .015; .12 MG/D; MG/D
RING VAGINAL
Qty: 3 EACH | Refills: 1 | Status: SHIPPED | OUTPATIENT
Start: 2024-04-05 | End: 2024-07-12

## 2024-04-05 NOTE — TELEPHONE ENCOUNTER
Pt stated that Dr. Barboza said she could possibly take over nuva ring rx that was originally prescribed by PP.  Pended medication  Routing to provider to advise.  Claire Owusu RN on 4/5/2024 at 10:21 AM

## 2024-04-08 ENCOUNTER — THERAPY VISIT (OUTPATIENT)
Dept: PHYSICAL THERAPY | Facility: CLINIC | Age: 34
End: 2024-04-08
Attending: OBSTETRICS & GYNECOLOGY
Payer: COMMERCIAL

## 2024-04-08 ENCOUNTER — VIRTUAL VISIT (OUTPATIENT)
Dept: BEHAVIORAL HEALTH | Facility: CLINIC | Age: 34
End: 2024-04-08
Payer: COMMERCIAL

## 2024-04-08 DIAGNOSIS — M79.18 MYALGIA OF PELVIC FLOOR: Primary | ICD-10-CM

## 2024-04-08 DIAGNOSIS — F41.1 GENERALIZED ANXIETY DISORDER: Primary | ICD-10-CM

## 2024-04-08 PROCEDURE — 97530 THERAPEUTIC ACTIVITIES: CPT | Mod: GP

## 2024-04-08 PROCEDURE — 97140 MANUAL THERAPY 1/> REGIONS: CPT | Mod: GP

## 2024-04-08 PROCEDURE — 97110 THERAPEUTIC EXERCISES: CPT | Mod: GP

## 2024-04-08 ASSESSMENT — COLUMBIA-SUICIDE SEVERITY RATING SCALE - C-SSRS
ATTEMPT SINCE LAST CONTACT: NO
2. HAVE YOU ACTUALLY HAD ANY THOUGHTS OF KILLING YOURSELF?: NO
1. SINCE LAST CONTACT, HAVE YOU WISHED YOU WERE DEAD OR WISHED YOU COULD GO TO SLEEP AND NOT WAKE UP?: NO
TOTAL  NUMBER OF INTERRUPTED ATTEMPTS SINCE LAST CONTACT: NO
TOTAL  NUMBER OF ABORTED OR SELF INTERRUPTED ATTEMPTS SINCE LAST CONTACT: NO
SUICIDE, SINCE LAST CONTACT: NO
6. HAVE YOU EVER DONE ANYTHING, STARTED TO DO ANYTHING, OR PREPARED TO DO ANYTHING TO END YOUR LIFE?: NO

## 2024-04-08 NOTE — PROGRESS NOTES
Transition Clinic Progress Note    Patient Name:   Jose Lizama Date: April 7, 2024          Service Type: Individual        VISIT TIME START: 0330pm      VISIT TIME END: 0415pm      Session Length:  TC Session Length: 45 (38-52 Minutes)    Session #:  7    Attendees: TC Attendees: Client alone    Service Modality:  Service Modality: Video Visit:      Provider verified identity through the following two step process.  Patient provided:  Patient address    Telemedicine Visit: The patient's condition can be safely assessed and treated via synchronous audio and visual telemedicine encounter.      Reason for Telemedicine Visit: Patient convenience (e.g. access to timely appointments / distance to available provider)    Originating Site (Patient Location): Patient's home    Distant Site (Provider Location): [facility]    Consent:  The patient/guardian has verbally consented to: the potential risks and benefits of telemedicine (video visit) versus in person care; bill my insurance or make self-payment for services provided; and responsibility for payment of non-covered services.     Patient would like the video invitation sent by:  My Chart    Mode of Communication:  [facility]    Distant Location (Provider):  Off-site    As the provider I attest to compliance with applicable laws and regulations related to telemedicine.    Informed Consent and Assessment Methods    This provider and patient discussed HIPAA, and the limits of confidentiality; including mandated reporting, the possibility of collaborative discussions with patient's primary care provider and the multidisciplinary team in the MH clinic during consultation.  Discussed the no show policy, and the benefits and possible unintended consequences of therapy.  Patient indicated understanding Transition Clinic services are short term, solution focused, until a referral can be made and  patient can bridge to long term therapy.  Patient verbally indicated understanding the informed consent.        DATA  Interactive Complexity: No  Crisis: No        Progress Since Last Session (Related to Symptoms / Goals / Homework):   Symptoms: No change pt reports some anxiety sxs associated with family situation.  Homework: Completed in session      Episode of Care Goals: Satisfactory progress - ACTION (Actively working towards change); Intervened by reinforcing change plan / affirming steps taken     Current / Ongoing Stressors and Concerns:   Pt reports anxiety sxs associated with a family stressor over the past week, she reports the stressor has decreased in severity recently.  Pt denies any excessive depression sxs.  Pt denies SI, HI, AH/VH. CSSR completed.  Pt reports she is hopeful, looking forward to weekend.  Pt reports the following protective factors: supportive family, willingness to engage in therapy, hope for the future, attached to family/friends, embedded in a protective network, demonstrates reliability to personal/professional life.    Pt largely processed this session related to family stressor over past week.  Writer actively listened, provided empathy and validation.  Pt and writer explored distress tolerance skills including DBT strategies and \IMPROV acronym with focus on improving the moment and distress tolerance.  Pt appeared engaged and receptive to the above interventions.      Plan:  4/15/24 @ 8:30am          Treatment Objective(s) Addressed in This Session:   Encourage identify three distraction and diversion activities and use those activities to decrease level of anxiety    Encourage self-care       Intervention:      Body Mind Centering (BMC) - Awareness of how the body responds to certain experiences informs developmental sphere. , Brief Psychotherapy - discussed and prioritizing the most efficient treatment., Cognitive Behavioral Therapy (CBT) - Discussed changing thoughts is the  path to changing behaviors and feelings., Dialectical Behavioral Therapy (DBT) - Helps to increase emotional and cognitive regulations by learning about the triggers that help to cope., Motivational Interviewing - helping to find the motivation to make positive behavior changes., and Person-Centered Therapy - Actualizes potential and moves towards increased awareness, spontaneity, trust in self and inner directedness.    Assessments completed prior to visit:    [place] Suicide Severity Rating Scale (Short Version)      2/12/2024     2:00 PM 2/26/2024     9:00 AM 3/4/2024     9:00 AM 3/11/2024     3:00 PM 3/18/2024     9:00 AM 4/1/2024     9:00 AM 4/8/2024     4:00 PM   [place] Suicide Severity Rating (Short Version)   1. Wish to be Dead (Since Last Contact) N N N N N N N   2. Non-Specific Active Suicidal Thoughts (Since Last Contact) N N N N N N N   Actual Attempt (Since Last Contact) N N N N N N N   Has subject engaged in non-suicidal self-injurious behavior? (Since Last Contact) N N N N N N N   Interrupted Attempts (Since Last Contact) N N N N N N N   Aborted or Self-Interrupted Attempt (Since Last Contact) N N N N N N N   Preparatory Acts or Behavior (Since Last Contact) N N N N N N N   Suicide (Since Last Contact) N N N N N N N   Calculated C-SSRS Risk Score (Since Last Contact) No Risk Indicated No Risk Indicated No Risk Indicated No Risk Indicated No Risk Indicated No Risk Indicated No Risk Indicated           ASSESSMENT: Current Emotional / Mental Status (status of significant symptoms):   Risk status (Self / Other harm or suicidal ideation)   Patient denies current fears or concerns for personal safety.   Patient denies current or recent suicidal ideation or behaviors.   Patient denies current or recent homicidal ideation or behaviors.   Patient denies current or recent self injurious behavior or ideation.   Patient denies other safety concerns.   Patient reports there has been no change in risk factors  since their last session.     Patient reports there has been no change in protective factors since their last session.     Recommended that patient call 911 or go to the local ED should there be a change in any of these risk factors.     Appearance:   Appropriate    Eye Contact:   Good    Psychomotor Behavior: Normal    Attitude:   Cooperative    Orientation:   All   Speech    Rate / Production: Normal     Volume:  Normal    Mood:    Normal   Affect:    Appropriate    Thought Content:  Clear    Thought Form:  Coherent  Logical    Insight:    Good      Medication Review:   No current psychiatric medications prescribed     Medication Compliance:   NA     Changes in Health Issues:   None reported     Chemical Use Review:   Substance Use: Chemical use reviewed, no active concerns identified      Tobacco Use: No current tobacco use.      Diagnoses:   300.02 (F41.1) Generalized Anxiety Disorder    Collateral Reports Completed:    Collateral: [facility] electronic medical records reviewed.    PLAN: (Patient Tasks / Therapist Tasks / Other)  Encourage distress tolerance coping skills  Encourage self-care    Plan:  4/15/24 8:30am    Procedure Code: Psychotherapy (with patient) - 45 [CPT 37095]    JOSE MIGUEL WARREN                                                         Safety plan reviewed with pt, pt agrees to follow, safety plan made available to pt, safety plan at the bottom of this note, and in pts active mychart.   ____________________________________________________________________________________________________________________________  Treatment Plan     Patient's Name: Jose Lizama                YOB: 1990  Date of Creation: 2/26/24  Date Treatment Plan Last Reviewed/Revised: Initial     DSM5 Diagnoses: 300.02 (F41.1) Generalized Anxiety Disorder  Psychosocial / Contextual Factors: Pt with reported situational stressors.  PROMIS (reviewed every 90 days):      Referral /  Collaboration:  N/A .     Anticipated number of session for this episode of care: 6-9 sessions  Anticipation frequency of session: Weekly  Anticipated Duration of each session: 38-52 minutes  Treatment plan will be reviewed in 90 days or when goals have been changed.         MeasurableTreatment Goal(s) related to diagnosis / functional impairment(s)  Goal 1: Patient will reduce overall frequency, intensity, and duration of the anxiety so that daily functioning is not impaired.       Objective #A                      Patient will describe situations, thoughts, feelings, and actions associated with anxieties and worries, their impact on functioning, and attempts to resolve them.      Intervention(s)      LMHP will focus on developing a level of trust with the patient; provide support and empathy to encourage the client to feel safe in expressing their symptoms.       LMHP will ask the patient to describe their past experiences of anxiety and their impact on functioning: assess the focus, excessiveness, and uncontrollability of the worry and the type, frequency, intensity, and duration of their anxiety symptoms.       Goal 2: Patient will learn and implement coping skills that result in a reduction of anxiety and worry and improved daily functioning.       Objective A                        Patient will learn and implement calming skills to reduce overall anxiety and manage anxiety symptoms      Objective B      Patient will verbalize understanding of the role that cognitive biases play in excessive irrational worry and persistent anxiety symptoms.       Intervention(s)      LMHP will teach the patient calming/relaxation skills and how to differentiate between relaxation and tension while teaching the patient how to implement these strategies in their daily life.       LMHP will assist the patient in analyzing their worries by examining cognitive distortions and the patient s ability to control their emotions  "surrounding cognitive distortions..        Patient has reviewed and agreed to the above plan.        MARIN FROST                February 25, 2024     ___________________________________________________________________________________________________________________________  Patient has no change in safety concerns. Committed to safety and agreed to follow previously developed safety plan.  Reviewed safety plan with pt, pt agreed to follow, safety plan made available to pt, safety plan at the bottom of this note and in pts active mychart.     Safety Plan       If I am feeling unsafe or I am in a crisis, I will:   -Contact my established care providers   -Call the National Suicide Prevention Lifeline: [phone]   -Go to the nearest emergency room   -Call 105      Warning signs that I or other people might notice when a crisis is developing for me:  increased \"big feelings\"  -Decreased appetite,    -Decreased sleep   -Restlessness   -Increased thoughts about wanting to die   -Increased irritability or agitation.       Things I am able to do on my own to cope or help me feel better:    -take a time-out. Practice yoga, listen to music, meditate, get a massage, or learn relaxation techniques. Stepping back from the problem helps clear your head.   -Eat well-balanced meals. Do not skip any meals. Do keep healthful, energy-boosting snacks on hand.   -Limit alcohol and caffeine   -Get enough sleep. When stressed, your body needs additional sleep and rest.    -Exercise daily to help you feel good and maintain your health.   -Accept that you cannot control everything. Put your stress in perspective: Is it really as bad as you think?    -Welcome humor. A good laugh goes a long way.      Things that I am able to do with others to cope or help me better:    -Listen and talk about concerns   -Continue to follow with outpatient care providers and case management    -Go for a walk   -Distract with going out to eat, to a movie " or a park.       Things I can use or do for distraction:    -Watch a TV show. If you don't have cable or a subscription service, many television networks offer free access, without a log-in, until you get closer to the most recent episodes.   -Watch a movie. Light-hearted comedy, drama to suck you in, or an old favorite - there are countless films to whisk you away for a bit.   -Sing. It doesn't matter if you're a professional vocalist or can't to carry a tune, singing engages a completely different part of your brain. Plus, the vibrations in your chest give great sensory feedback and the vocalization reminds you of your voice.   -Watch cute videos on [employer]. About as low-effort as it gets: puppy/kassy videos, laugh challenges, or Vine compilations - take your pick.  -Mindless doodles/finger painting/playing with marcial. This may be especially helpful to those with child parts (DID/OSDD) who need an activity of their own.   -Grab a snack.   -Drum on a surface. Like singing, the vibrations and bilateral stimulation of your hands thumping will engage different parts of your mind and bring your attention away from what's intruding on you.   -Play a game or use a fun evan on your phone. Even if you aren't a , search the evan store. You might find one that speaks to you. It can be a great escape to get lost in for a bit.   -Video games. Any console, any game!   -Tear out words/photos/etc for a collage. Ask a local doctor's office or hairdresser for their spare magazines. Mindlessly rip out photos and words that speak to you. (Bonus: you may get to put tabloids to good use for once! They often have the scathing, overdramatic words that happen to be great for a therapeutic collages. Shocking! Betrayal! You Won't Believe It!).   -Discover new music. [employer]api[employer], [place], -[place], so many ways to find new gems!   -Wash your face/hands or brush your teeth. A quick refresher can help you restart your day on a brand new page.    -Re-watch highlights from your favorite sport. It's easy to forget just how many epic, captivating moments there were once some time has passed. Relive your excitement. Plus, you already know how it ends, so you don't have to pay super close attention!   -Gratitude list. When your mind only wants to remind you of distressing things, focusing on 10+ things you're grateful for can really take you to a whole new atmosphere in your mind and heart.  -Imagery exercises. Containment exercises, healing pool/healing light, guided meditation, so many options!   -Play a board game with a friend. Something simple like Sorry!, challenging like chess, or silly like Cards Against Humanity, there are lots of options to distract you in the company of friends.   -Card games. Solo works, too, if there's no one around.      Changes I can make to support my mental health and wellness:      1. Value yourself: Treat yourself with kindness and respect, and avoid self-criticism. Make time for your hobbies and favorite projects, or broaden your horizons. Do a daily crossword puzzle, plant a garden, take dance lessons, learn to play an instrument or become fluent in another language.      2. Take care of your body: Taking care of yourself physically can improve your mental health. Be sure to:   -Eat nutritious meals   -Avoid smoking and vaping-  -Drink plenty of water   -Exercise, which helps decrease depression and anxiety and improve moods   -Get enough sleep. Researchers believe that lack of sleep contributes to a high rate of depression in college students.       3. Surround yourself with good people: People with strong family or social connections are generally healthier than those who lack a support network. Make plans with supportive family members and friends, or seek out activities where you can meet new people, such as a club, class or support group.      4. Give yourself: Volunteer your time and energy to help someone else.  "You'll feel good about doing something tangible to help someone in need -- and it's a great way to meet new people. See Fun and Cheap Things to do in [place] for ideas.      5. Learn how to deal with stress: Like it or not, stress is a part of life. Practice good coping skills: Try One-Minute Stress Strategies, do Rehan Chi, exercise, take a nature walk, play with your pet or try journal writing as a stress reducer. Also, remember to smile and see the humor in life. Research shows that laughter can boost your immune system, ease pain, relax your body and reduce stress.      6. Quiet your mind: Try meditating, Mindfulness and/or prayer. Relaxation exercises and prayer can improve your state of mind and outlook on life. In fact, research shows that meditation may help you feel calm and enhance the effects of therapy. To get connected, see spiritual resources on Personal Well-being for Students      7. Set realistic goals: Decide what you want to achieve academically, professionally and personally, and write down the steps you need to realize your goals. Aim high, but be realistic and don't over-schedule. You'll enjoy a tremendous sense of accomplishment and self-worth as you progress toward your goal. Wellness Coaching, free to - students, can help you develop goals and stay on track.       8. Break up the monotony: Although our routines make us more efficient and enhance our feelings of security and safety, a little change of pace can perk up a tedious schedule. Alter your jogging route, plan a road-trip, take a walk in a different park, hang some new pictures or try a new restaurant.      9. Avoid alcohol and other drugs: Keep alcohol use to a minimum and avoid other drugs. Sometimes people use alcohol and other drugs to \"self-medicate\" but in reality, alcohol and other drugs only aggravate problems.      10. Get help when you need it: Seeking help is a sign of strength -- not a weakness. And it is important to " remember that treatment is effective. People who get appropriate care can recover from mental illness and addiction and lead full, rewarding lives.      People in my life that I can ask for help: parents, friends  -Spouse   -Friends   -Therapist    -Other Mental health Supportive Services      Your [facility] has a mental health crisis team you can call 24/7:    Phone: [facility] Crisis line:  [phone]

## 2024-04-15 ENCOUNTER — VIRTUAL VISIT (OUTPATIENT)
Dept: BEHAVIORAL HEALTH | Facility: CLINIC | Age: 34
End: 2024-04-15
Payer: COMMERCIAL

## 2024-04-15 DIAGNOSIS — F41.1 GENERALIZED ANXIETY DISORDER: Primary | ICD-10-CM

## 2024-04-15 ASSESSMENT — COLUMBIA-SUICIDE SEVERITY RATING SCALE - C-SSRS
2. HAVE YOU ACTUALLY HAD ANY THOUGHTS OF KILLING YOURSELF?: NO
6. HAVE YOU EVER DONE ANYTHING, STARTED TO DO ANYTHING, OR PREPARED TO DO ANYTHING TO END YOUR LIFE?: NO
TOTAL  NUMBER OF ABORTED OR SELF INTERRUPTED ATTEMPTS SINCE LAST CONTACT: NO
1. SINCE LAST CONTACT, HAVE YOU WISHED YOU WERE DEAD OR WISHED YOU COULD GO TO SLEEP AND NOT WAKE UP?: NO
SUICIDE, SINCE LAST CONTACT: NO
TOTAL  NUMBER OF INTERRUPTED ATTEMPTS SINCE LAST CONTACT: NO
ATTEMPT SINCE LAST CONTACT: NO

## 2024-04-15 NOTE — PROGRESS NOTES
Transition Clinic Progress Note    Patient Name:   Jose Lizama Date: 2024          Service Type: Individual        VISIT TIME START: 830      VISIT TIME END: 915      Session Length:  TC Session Length: 45 (38-52 Minutes)    Session #:  8    Attendees: TC Attendees: Client alone    Service Modality:  Service Modality: Video Visit:      Provider verified identity through the following two step process.  Patient provided:  Patient  and Patient address    Telemedicine Visit: The patient's condition can be safely assessed and treated via synchronous audio and visual telemedicine encounter.      Reason for Telemedicine Visit: Patient convenience (e.g. access to timely appointments / distance to available provider)    Originating Site (Patient Location): Patient's home    Distant Site (Provider Location): [facility]    Consent:  The patient/guardian has verbally consented to: the potential risks and benefits of telemedicine (video visit) versus in person care; bill my insurance or make self-payment for services provided; and responsibility for payment of non-covered services.     Patient would like the video invitation sent by:  My Chart    Mode of Communication:  Video Conference via [facility]    Distant Location (Provider):  Off-site    As the provider I attest to compliance with applicable laws and regulations related to telemedicine.    Informed Consent and Assessment Methods    This provider and patient discussed HIPAA, and the limits of confidentiality; including mandated reporting, the possibility of collaborative discussions with patient's primary care provider and the multidisciplinary team in the MH clinic during consultation.  Discussed the no show policy, and the benefits and possible unintended consequences of therapy.  Patient indicated understanding Transition Clinic services are short term, solution focused, until a referral can be  made and patient can bridge to long term therapy.  Patient verbally indicated understanding the informed consent.        DATA  Interactive Complexity: No  Crisis: No        Progress Since Last Session (Related to Symptoms / Goals / Homework):   Symptoms: Improving    Homework: Completed in session      Episode of Care Goals: Satisfactory progress - ACTION (Actively working towards change); Intervened by reinforcing change plan / affirming steps taken     Current / Ongoing Stressors and Concerns:   Pt calma and pleasant, denies excessive anxiety or depression sxs today. States she is doing well.  Pt denies SI, HI, AH/VH.  Pt reports she is hopeful, looking forward to seeing partner. Pt reports she is hopeful, looking forward to weekend.  Pt reports the following protective factors: supportive family, willingness to engage in therapy, hope for the future, attached to family/friends, embedded in a protective network, demonstrates reliability to personal/professional life.       Pt processed largely in session regarding family, interpersonal, and stressors.  Writer actively listened, provided empathy and validation.  Reviewed tx plan today.   Pt and writer explored coping skill psychoeducation on [place] [employer] [place] Mindfulness Exercise, the 4 agreements, and stress management tips worksheet.  Pt appeared engaged and receptive to the above interventions.      Plan:  4/22 @ 8:30 am     Treatment Objective(s) Addressed in This Session:   Encourage identify three distraction and diversion activities and use those activities to decrease level of anxiety    Encourage self-care     Intervention:      Brief Psychotherapy - discussed and prioritizing the most efficient treatment., Cognitive Behavioral Therapy (CBT) - Discussed changing thoughts is the path to changing behaviors and feelings., Motivational Interviewing - helping to find the motivation to make positive behavior changes., and Person-Centered Therapy - Actualizes  potential and moves towards increased awareness, spontaneity, trust in self and inner directedness.    Assessments completed prior to visit:    [place] Suicide Severity Rating Scale (Short Version)      2/26/2024     9:00 AM 3/4/2024     9:00 AM 3/11/2024     3:00 PM 3/18/2024     9:00 AM 4/1/2024     9:00 AM 4/8/2024     4:00 PM 4/15/2024     9:00 AM   Crane Suicide Severity Rating (Short Version)   1. Wish to be Dead (Since Last Contact) N N N N N N N   2. Non-Specific Active Suicidal Thoughts (Since Last Contact) N N N N N N N   Actual Attempt (Since Last Contact) N N N N N N N   Has subject engaged in non-suicidal self-injurious behavior? (Since Last Contact) N N N N N N N   Interrupted Attempts (Since Last Contact) N N N N N N N   Aborted or Self-Interrupted Attempt (Since Last Contact) N N N N N N N   Preparatory Acts or Behavior (Since Last Contact) N N N N N N N   Suicide (Since Last Contact) N N N N N N N   Calculated C-SSRS Risk Score (Since Last Contact) No Risk Indicated No Risk Indicated No Risk Indicated No Risk Indicated No Risk Indicated No Risk Indicated No Risk Indicated          ASSESSMENT: Current Emotional / Mental Status (status of significant symptoms):   Risk status (Self / Other harm or suicidal ideation)   Patient denies current fears or concerns for personal safety.   Patient denies current or recent suicidal ideation or behaviors.   Patient denies current or recent homicidal ideation or behaviors.   Patient denies current or recent self injurious behavior or ideation.   Patient denies other safety concerns.   Patient reports there has been no change in risk factors since their last session.     Patient reports there has been no change in protective factors since their last session.     Recommended that patient call 911 or go to the local ED should there be a change in any of these risk factors.     Appearance:   Appropriate    Eye Contact:   Good    Psychomotor Behavior: Normal     Attitude:   Cooperative    Orientation:   All   Speech    Rate / Production: Normal     Volume:  Normal    Mood:    Normal   Affect:    Appropriate    Thought Content:  Clear    Thought Form:  Coherent  Logical    Insight:    Good      Medication Review:   No current psychiatric medications prescribed     Medication Compliance:   NA     Changes in Health Issues:   None reported     Chemical Use Review:   Substance Use: Chemical use reviewed, no active concerns identified      Tobacco Use: No current tobacco use.      Diagnoses:   300.02 (F41.1) Generalized Anxiety Disorder    Collateral Reports Completed:    Collateral: [facility] electronic medical records reviewed.    PLAN: (Patient Tasks / Therapist Tasks / Other)  Plan:  4/22/24 @ 8:30am    Procedure Code: Psychotherapy (with patient) - 45 [CPT 05336]    JOSE MIGUEL WARREN           Safety plan reviewed with pt, pt agrees to follow, safety plan made available to pt, safety plan at the bottom of this note, and in pts active mychart.   ____________________________________________________________________________________________________________________________  Treatment Plan     Patient's Name: Jose Lizama                YOB: 1990  Date of Creation: 2/26/24  Date Treatment Plan Last Reviewed/Revised: Initial     DSM5 Diagnoses: 300.02 (F41.1) Generalized Anxiety Disorder  Psychosocial / Contextual Factors: Pt with reported situational stressors.  PROMIS (reviewed every 90 days):      Referral / Collaboration:  N/A .     Anticipated number of session for this episode of care: 6-9 sessions  Anticipation frequency of session: Weekly  Anticipated Duration of each session: 38-52 minutes  Treatment plan will be reviewed in 90 days or when goals have been changed.         MeasurableTreatment Goal(s) related to diagnosis / functional impairment(s)  Goal 1: Patient will reduce overall frequency, intensity, and duration of the anxiety so that  daily functioning is not impaired.       Objective #A                      Patient will describe situations, thoughts, feelings, and actions associated with anxieties and worries, their impact on functioning, and attempts to resolve them.      Intervention(s)      LMHP will focus on developing a level of trust with the patient; provide support and empathy to encourage the client to feel safe in expressing their symptoms.       LMHP will ask the patient to describe their past experiences of anxiety and their impact on functioning: assess the focus, excessiveness, and uncontrollability of the worry and the type, frequency, intensity, and duration of their anxiety symptoms.       Goal 2: Patient will learn and implement coping skills that result in a reduction of anxiety and worry and improved daily functioning.       Objective A                        Patient will learn and implement calming skills to reduce overall anxiety and manage anxiety symptoms      Objective B      Patient will verbalize understanding of the role that cognitive biases play in excessive irrational worry and persistent anxiety symptoms.       Intervention(s)      LMHP will teach the patient calming/relaxation skills and how to differentiate between relaxation and tension while teaching the patient how to implement these strategies in their daily life.       LMHP will assist the patient in analyzing their worries by examining cognitive distortions and the patient s ability to control their emotions surrounding cognitive distortions..        Patient has reviewed and agreed to the above plan.        MARIN FROST                February 25, 2024     ___________________________________________________________________________________________________________________________  Patient has no change in safety concerns. Committed to safety and agreed to follow previously developed safety plan.  Reviewed safety plan with pt, pt agreed to follow, safety  "plan made available to pt, safety plan at the bottom of this note and in pts active juanahart.     Safety Plan       If I am feeling unsafe or I am in a crisis, I will:   -Contact my established care providers   -Call the National Suicide Prevention Lifeline: [phone]   -Go to the nearest emergency room   -Call 911      Warning signs that I or other people might notice when a crisis is developing for me:  increased \"big feelings\"  -Decreased appetite,    -Decreased sleep   -Restlessness   -Increased thoughts about wanting to die   -Increased irritability or agitation.       Things I am able to do on my own to cope or help me feel better:    -take a time-out. Practice yoga, listen to music, meditate, get a massage, or learn relaxation techniques. Stepping back from the problem helps clear your head.   -Eat well-balanced meals. Do not skip any meals. Do keep healthful, energy-boosting snacks on hand.   -Limit alcohol and caffeine   -Get enough sleep. When stressed, your body needs additional sleep and rest.    -Exercise daily to help you feel good and maintain your health.   -Accept that you cannot control everything. Put your stress in perspective: Is it really as bad as you think?    -Welcome humor. A good laugh goes a long way.      Things that I am able to do with others to cope or help me better:    -Listen and talk about concerns   -Continue to follow with outpatient care providers and case management    -Go for a walk   -Distract with going out to eat, to a movie or a park.       Things I can use or do for distraction:    -Watch a TV show. If you don't have cable or a subscription service, many television networks offer free access, without a log-in, until you get closer to the most recent episodes.   -Watch a movie. Light-hearted comedy, drama to suck you in, or an old favorite - there are countless films to whisk you away for a bit.   -Sing. It doesn't matter if you're a professional vocalist or can't to " carry a tune, singing engages a completely different part of your brain. Plus, the vibrations in your chest give great sensory feedback and the vocalization reminds you of your voice.   -Watch cute videos on Uni[employer]. About as low-effort as it gets: puppy/kassy videos, laugh challenges, or Vine compilations - take your pick.  -Mindless doodles/finger painting/playing with marcial. This may be especially helpful to those with child parts (DID/OSDD) who need an activity of their own.   -Grab a snack.   -Drum on a surface. Like singing, the vibrations and bilateral stimulation of your hands thumping will engage different parts of your mind and bring your attention away from what's intruding on you.   -Play a game or use a fun evan on your phone. Even if you aren't a , search the evan store. You might find one that speaks to you. It can be a great escape to get lost in for a bit.   -Video games. Any console, any game!   -Tear out words/photos/etc for a collage. Ask a local doctor's office or hairdresser for their spare magazines. Mindlessly rip out photos and words that speak to you. (Bonus: you may get to put tabloids to good use for once! They often have the scathing, overdramatic words that happen to be great for a therapeutic collages. Shocking! Betrayal! You Won't Believe It!).   -Discover new music. UniSmartube, Spotify, -[place], so many ways to find new gems!   -Wash your face/hands or brush your teeth. A quick refresher can help you restart your day on a brand new page.   -Re-watch highlights from your favorite sport. It's easy to forget just how many epic, captivating moments there were once some time has passed. Relive your excitement. Plus, you already know how it ends, so you don't have to pay super close attention!   -Gratitude list. When your mind only wants to remind you of distressing things, focusing on 10+ things you're grateful for can really take you to a whole new atmosphere in your mind and  heart.  -Imagery exercises. Containment exercises, healing pool/healing light, guided meditation, so many options!   -Play a board game with a friend. Something simple like Sorry!, challenging like chess, or silly like Cards Against Humanity, there are lots of options to distract you in the company of friends.   -Card games. Solo works, too, if there's no one around.      Changes I can make to support my mental health and wellness:      1. Value yourself: Treat yourself with kindness and respect, and avoid self-criticism. Make time for your hobbies and favorite projects, or broaden your horizons. Do a daily crossword puzzle, plant a garden, take dance lessons, learn to play an instrument or become fluent in another language.      2. Take care of your body: Taking care of yourself physically can improve your mental health. Be sure to:   -Eat nutritious meals   -Avoid smoking and vaping-  -Drink plenty of water   -Exercise, which helps decrease depression and anxiety and improve moods   -Get enough sleep. Researchers believe that lack of sleep contributes to a high rate of depression in college students.       3. Surround yourself with good people: People with strong family or social connections are generally healthier than those who lack a support network. Make plans with supportive family members and friends, or seek out activities where you can meet new people, such as a club, class or support group.      4. Give yourself: Volunteer your time and energy to help someone else. You'll feel good about doing something tangible to help someone in need -- and it's a great way to meet new people. See Fun and Cheap Things to do in [place] for ideas.      5. Learn how to deal with stress: Like it or not, stress is a part of life. Practice good coping skills: Try One-Minute Stress Strategies, do Rehan Chi, exercise, take a nature walk, play with your pet or try journal writing as a stress reducer. Also, remember to smile and  "see the humor in life. Research shows that laughter can boost your immune system, ease pain, relax your body and reduce stress.      6. Quiet your mind: Try meditating, Mindfulness and/or prayer. Relaxation exercises and prayer can improve your state of mind and outlook on life. In fact, research shows that meditation may help you feel calm and enhance the effects of therapy. To get connected, see spiritual resources on Personal Well-being for Students      7. Set realistic goals: Decide what you want to achieve academically, professionally and personally, and write down the steps you need to realize your goals. Aim high, but be realistic and don't over-schedule. You'll enjoy a tremendous sense of accomplishment and self-worth as you progress toward your goal. Wellness Coaching, free to - students, can help you develop goals and stay on track.       8. Break up the monotony: Although our routines make us more efficient and enhance our feelings of security and safety, a little change of pace can perk up a tedious schedule. Alter your jogging route, plan a road-trip, take a walk in a different park, hang some new pictures or try a new restaurant.      9. Avoid alcohol and other drugs: Keep alcohol use to a minimum and avoid other drugs. Sometimes people use alcohol and other drugs to \"self-medicate\" but in reality, alcohol and other drugs only aggravate problems.      10. Get help when you need it: Seeking help is a sign of strength -- not a weakness. And it is important to remember that treatment is effective. People who get appropriate care can recover from mental illness and addiction and lead full, rewarding lives.      People in my life that I can ask for help: parents, friends  -Spouse   -Friends   -Therapist    -Other Mental health Supportive Services      Your [facility] has a mental health crisis team you can call 24/7:    Phone: [facility] Crisis line:  [phone]  "

## 2024-04-17 ENCOUNTER — TELEPHONE (OUTPATIENT)
Dept: BEHAVIORAL HEALTH | Facility: CLINIC | Age: 34
End: 2024-04-17
Payer: COMMERCIAL

## 2024-04-17 NOTE — TELEPHONE ENCOUNTER
Writer spoke with patient on que and rescheduled 04/22/2024 to 7:30 am as requested by patient.    Lisa Conde  04/17/2024  941

## 2024-04-19 ENCOUNTER — OFFICE VISIT (OUTPATIENT)
Dept: DERMATOLOGY | Facility: CLINIC | Age: 34
End: 2024-04-19
Payer: COMMERCIAL

## 2024-04-19 DIAGNOSIS — L82.0 INFLAMED SEBORRHEIC KERATOSIS: Primary | ICD-10-CM

## 2024-04-19 DIAGNOSIS — L30.1 DYSHIDROTIC ECZEMA: ICD-10-CM

## 2024-04-19 DIAGNOSIS — L30.9 DERMATITIS: ICD-10-CM

## 2024-04-19 PROCEDURE — 17110 DESTRUCTION B9 LES UP TO 14: CPT | Performed by: PHYSICIAN ASSISTANT

## 2024-04-19 PROCEDURE — 99214 OFFICE O/P EST MOD 30 MIN: CPT | Mod: 25 | Performed by: PHYSICIAN ASSISTANT

## 2024-04-19 RX ORDER — TACROLIMUS 1 MG/G
OINTMENT TOPICAL 2 TIMES DAILY
Qty: 60 G | Refills: 4 | Status: SHIPPED | OUTPATIENT
Start: 2024-04-19

## 2024-04-19 NOTE — PATIENT INSTRUCTIONS
Patient Education        Proper skin care from [facility]:     -Eliminate harsh soaps as they strip the natural oils from the skin, often resulting in dry itchy skin ( i.e. Dial, Zest, [place])  -Use mild soaps such as Cetaphil or Dove Sensitive Skin in the shower. You do not need to use soap on arms, legs, and trunk every time you shower unless visibly soiled.   -Avoid hot or cold showers.  -After showering, lightly dry off and apply moisturizing within 2-3 minutes. This will help trap moisture in the skin.   -Aggressive use of a moisturizer at least 1-2 times a day to the entire body (including -Vanicream, Cetaphil, Aquaphor or Cerave) and moisturize hands after every washing.  -We recommend using moisturizers that come in a tub that needs to be scooped out, not a pump. This has more of an oil base. It will hold moisture in your skin much better than a water base moisturizer. The above recommended are non-pore clogging.        Wear a sunscreen with at least SPF 30 on your face, ears, neck and V of the chest daily. Wear sunscreen on other areas of the body if those areas are exposed to the sun throughout the day. Sunscreens can contain physical and/or chemical blockers. Physical blockers are less likely to clog pores, these include zinc oxide and titanium dioxide. Reapply every two hour and after swimming.      Sunscreen examples: https://www.ewg.org/sunscreen/     UV radiation  UVA radiation remains constant throughout the day and throughout the year. It is a longer wavelength than UVB and therefore penetrates deeper into the skin leading to immediate and delayed tanning, photoaging, and skin cancer. 70-80% of UVA and UVB radiation occurs between the hours of 10am-2pm.  UVB radiation  UVB radiation causes the most harmful effects and is more significant during the summer months. However, snow and ice can reflect UVB radiation leading to skin damage during the winter months as well. UVB radiation is  responsible for tanning, burning, inflammation, delayed erythema (pinkness), pigmentation (brown spots), and skin cancer.      I recommend self monthly full body exams and yearly full body exams with a dermatology provider. If you develop a new or changing lesion please follow up for examination. Most skin cancers are pink and scaly or pink and pearly. However, we do see blue/brown/black skin cancers.  Consider the ABCDEs of melanoma when giving yourself your monthly full body exam ( don't forget the groin, buttocks, feet, toes, etc). A-asymmetry, B-borders, C-color, D-diameter, E-elevation or evolving. If you see any of these changes please follow up in clinic. If you cannot see your back I recommend purchasing a hand held mirror to use with a larger wall mirror.       Checking for Skin Cancer  You can find cancer early by checking your skin each month. There are 3 kinds of skin cancer. They are melanoma, basal cell carcinoma, and squamous cell carcinoma. Doing monthly skin checks is the best way to find new marks or skin changes. Follow the instructions below for checking your skin.   The ABCDEs of checking moles for melanoma   Check your moles or growths for signs of melanoma using ABCDE:   Asymmetry: the sides of the mole or growth don t match  Border: the edges are ragged, notched, or blurred  Color: the color within the mole or growth varies  Diameter: the mole or growth is larger than 6 mm (size of a pencil eraser)  Evolving: the size, shape, or color of the mole or growth is changing (evolving is not shown in the images below)    Checking for other types of skin cancer  Basal cell carcinoma or squamous cell carcinoma have symptoms such as:      A spot or mole that looks different from all other marks on your skin  Changes in how an area feels, such as itching, tenderness, or pain  Changes in the skin's surface, such as oozing, bleeding, or scaliness  A sore that does not heal  New swelling or redness beyond  the border of a mole     Who s at risk?  Anyone can get skin cancer. But you are at greater risk if you have:   Fair skin, light-colored hair, or light-colored eyes  Many moles or abnormal moles on your skin  A history of sunburns from sunlight or tanning beds  A family history of skin cancer  A history of exposure to radiation or chemicals  A weakened immune system  If you have had skin cancer in the past, you are at risk for recurring skin cancer.   How to check your skin  Do your monthly skin checkups in front of a full-length mirror. Check all parts of your body, including your:   Head (ears, face, neck, and scalp)  Torso (front, back, and sides)  Arms (tops, undersides, upper, and lower armpits)  Hands (palms, backs, and fingers, including under the nails)  Buttocks and genitals  Legs (front, back, and sides)  Feet (tops, soles, toes, including under the nails, and between toes)  If you have a lot of moles, take digital photos of them each month. Make sure to take photos both up close and from a distance. These can help you see if any moles change over time.   Most skin changes are not cancer. But if you see any changes in your skin, call your doctor right away. Only he or she can diagnose a problem. If you have skin cancer, seeing your doctor can be the first step toward getting the treatment that could save your life.   [employer] last reviewed this educational content on 4/1/2019 2000-2020 [employer]. [address] [place] [address], [place] [address]. All rights reserved. This information is not intended as a substitute for professional medical care. Always follow your healthcare professional's instructions.        When should I call my doctor?  If you are worsening or not improving, please, contact us or seek urgent care as noted below.      Who should I call with questions (adults)?  [facility] (adult and pediatric): [phone]  [facility]  [place] (adult): [phone]  [facility] ([place], [place], [place] and [place]) [phone]  For urgent needs outside of business hours call the [facility] at [phone] and ask for the dermatology resident on call to be paged  If this is a medical emergency and you are unable to reach an ER, Call 911        If you need a prescription refill, please contact your pharmacy. Refills are approved or denied by our Physicians during normal business hours, Monday through Fridays  Per office policy, refills will not be granted if you have not been seen within the past year (or sooner depending on your child's condition)

## 2024-04-19 NOTE — PROGRESS NOTES
[facility] Dermatology Note  Encounter Date: Apr 19, 2024  Office Visit     Reviewed patients past medical history and pertinent chart review prior to patients visit today.     Dermatology Problem List:  # Dyshidrotic eczema   - triamcinolone 0.1% ointment, tacrolimus 0.1% ointment  # Inflamed seborrheic keratosis  - cryotherapy 4/19/2024      Social history: Works as an ID nurse    ____________________________________________    Assessment & Plan:     # Acute atopic dermatitis, right ankle  # Dyshidrotic eczema, hands     - For flares: Start triamcinolone 0.1% ointment twice daily for 2 weeks. Discussed risk of skin atrophy with long term chronic use. Not for face, armpits or groin.   - For maintenance: Start tacrolimus 0.1% ointment twice daily as needed.     - We discussed the importance of daily emollients. Options include Vanicream, CeraVe Cream, Cetaphil Cream, or Vaseline. Use non-scented soaps and detergents.     # Inflamed seborrheic keratoses x1  The benign nature of the skin lesion(s) was discussed with the patient.  Due to the inflamed and irritated nature of the lesions I recommend cryotherapy and the patient was agreeable.      Cryotherapy procedure note, location(s): left neck x1 After verbal consent and discussion of risks and benefits including, but not limited to, dyspigmentation/scar, blister, and pain, the lesion(s) was(were) treated with 1-2 mm freeze border for 1-2 cycles with liquid nitrogen. Post cryotherapy instructions were provided.      All risks, benefits and alternatives were discussed with patient.  Patient is in agreement and understands the assessment and plan.  All questions were answered.  Cherry Johnson PA-C  [facility]  _______________________________________    CC: RECHECK (Here to recheck eczema, thinks it looks better today. Has a mole of concern on left upper chest/neck. Also has lesion of concern on right ankle, itching)    HPI:  Ms.  Jose Lizama is a(n) 34 year old female who presents today as a return patient for eczema.  She reports this has improved with triamcinolone ointment, but not resolved completely. The patient also notes a longstanding area of itching involving the right ankle. She has not tried anything for this. Finally, she requests evaluation of a lesion involving the left neck.  The lesion has become more raised over time and is frequently itchy and irritated.Patient is otherwise feeling well, without additional skin concerns.      Physical Exam:  SKIN: Focused examination of left neck, hands, and right lateral ankle was performed.  - The base of the left 3rd finger, extending to the web spaces, and scattered on the palms are erythematous papules with fine scale. Pink plaque with fine scale involving the right lateral ankle.   - The left neck x1 demonstrates waxy papule(s) with an erythematous base, consistent with inflamed seborrheic keratoses.     - No other lesions of concern on areas examined.     Medications:  Current Outpatient Medications   Medication Sig Dispense Refill    albuterol (PROAIR HFA/PROVENTIL HFA/VENTOLIN HFA) 108 (90 Base) MCG/ACT inhaler Inhale 2 puffs into the lungs every 6 hours as needed for shortness of breath, wheezing or cough 18 g 0    chlorhexidine (PERIDEX) 0.12 % solution Swish and spit 15 mLs in mouth 2 times daily 118 mL 0    cyclobenzaprine (FLEXERIL) 10 MG tablet Take 1 tablet (10 mg) by mouth nightly as needed for muscle spasms 30 tablet 0    etonogestrel-ethinyl estradiol (NUVARING) 0.12-0.015 MG/24HR vaginal ring Insert one (1) ring vaginally and leave in place for 3 consecutive weeks (21 days), then remove for 1 week. 3 each 1    HALOETTE 0.12-0.015 MG/24HR vaginal ring INSERT 1 RING VAGINALLY FOR 3 WEEKS THEN REMOVE FOR 1 WEEK      triamcinolone (KENALOG) 0.1 % external ointment Apply to rash twice daily for 2-4 weeks, then stop. Restart if rash flares in the future. Not for groin,  underarms, or face. 30 g 4     No current facility-administered medications for this visit.      Past Medical History:   Patient Active Problem List   Diagnosis    Moderate major depression (H)    CARDIOVASCULAR SCREENING; LDL GOAL LESS THAN 160    Anxiety    Fibroadenoma of breast, right    Marijuana smoker     Past Medical History:   Diagnosis Date    Depressive disorder        CC JEFFERY Mtz  [facility] [place] [address] on close of this encounter.

## 2024-04-19 NOTE — NURSING NOTE
Chief Complaint   Patient presents with    RECHECK     Here to recheck eczema, thinks it looks better today. Has a mole of concern on left upper chest/neck. Also has lesion of concern on right ankle, itching     Kaley MASON RN-BSN  Dermatology Surgery  [phone]

## 2024-04-19 NOTE — LETTER
4/19/2024       RE: Jose Lizama  [address]  [place] [address]     Dear Colleague,    Thank you for referring your patient, Jose Lizama, to the [facility] [place] [facility]. Please see a copy of my visit note below.    [facility] Dermatology Note  Encounter Date: Apr 19, 2024  Office Visit     Reviewed patients past medical history and pertinent chart review prior to patients visit today.     Dermatology Problem List:  # Dyshidrotic eczema   - triamcinolone 0.1% ointment, tacrolimus 0.1% ointment  # Inflamed seborrheic keratosis  - cryotherapy 4/19/2024      Social history: Works as an ID nurse    ____________________________________________    Assessment & Plan:     # Acute atopic dermatitis, right ankle  # Dyshidrotic eczema, hands     - For flares: Start triamcinolone 0.1% ointment twice daily for 2 weeks. Discussed risk of skin atrophy with long term chronic use. Not for face, armpits or groin.   - For maintenance: Start tacrolimus 0.1% ointment twice daily as needed.     - We discussed the importance of daily emollients. Options include Vanicream, CeraVe Cream, Cetaphil Cream, or Vaseline. Use non-scented soaps and detergents.     # Inflamed seborrheic keratoses x1  The benign nature of the skin lesion(s) was discussed with the patient.  Due to the inflamed and irritated nature of the lesions I recommend cryotherapy and the patient was agreeable.      Cryotherapy procedure note, location(s): left neck x1 After verbal consent and discussion of risks and benefits including, but not limited to, dyspigmentation/scar, blister, and pain, the lesion(s) was(were) treated with 1-2 mm freeze border for 1-2 cycles with liquid nitrogen. Post cryotherapy instructions were provided.      All risks, benefits and alternatives were discussed with patient.  Patient is in agreement and understands the  assessment and plan.  All questions were answered.  Cherry Johnson PA-C  [facility]  _______________________________________    CC: RECHECK (Here to recheck eczema, thinks it looks better today. Has a mole of concern on left upper chest/neck. Also has lesion of concern on right ankle, itching)    HPI:  Ms. Jose Lizama is a(n) 34 year old female who presents today as a return patient for eczema.  She reports this has improved with triamcinolone ointment, but not resolved completely. The patient also notes a longstanding area of itching involving the right ankle. She has not tried anything for this. Finally, she requests evaluation of a lesion involving the left neck.  The lesion has become more raised over time and is frequently itchy and irritated.Patient is otherwise feeling well, without additional skin concerns.      Physical Exam:  SKIN: Focused examination of left neck, hands, and right lateral ankle was performed.  - The base of the left 3rd finger, extending to the web spaces, and scattered on the palms are erythematous papules with fine scale. Pink plaque with fine scale involving the right lateral ankle.   - The left neck x1 demonstrates waxy papule(s) with an erythematous base, consistent with inflamed seborrheic keratoses.     - No other lesions of concern on areas examined.     Medications:  Current Outpatient Medications   Medication Sig Dispense Refill    albuterol (PROAIR HFA/PROVENTIL HFA/VENTOLIN HFA) 108 (90 Base) MCG/ACT inhaler Inhale 2 puffs into the lungs every 6 hours as needed for shortness of breath, wheezing or cough 18 g 0    chlorhexidine (PERIDEX) 0.12 % solution Swish and spit 15 mLs in mouth 2 times daily 118 mL 0    cyclobenzaprine (FLEXERIL) 10 MG tablet Take 1 tablet (10 mg) by mouth nightly as needed for muscle spasms 30 tablet 0    etonogestrel-ethinyl estradiol (NUVARING) 0.12-0.015 MG/24HR vaginal ring Insert one (1) ring vaginally and leave in place for  3 consecutive weeks (21 days), then remove for 1 week. 3 each 1    HALOETTE 0.12-0.015 MG/24HR vaginal ring INSERT 1 RING VAGINALLY FOR 3 WEEKS THEN REMOVE FOR 1 WEEK      triamcinolone (KENALOG) 0.1 % external ointment Apply to rash twice daily for 2-4 weeks, then stop. Restart if rash flares in the future. Not for groin, underarms, or face. 30 g 4     No current facility-administered medications for this visit.      Past Medical History:   Patient Active Problem List   Diagnosis    Moderate major depression (H)    CARDIOVASCULAR SCREENING; LDL GOAL LESS THAN 160    Anxiety    Fibroadenoma of breast, right    Marijuana smoker     Past Medical History:   Diagnosis Date    Depressive disorder        CC Adenike Bull, PA  [address] [place] [address] on close of this encounter.

## 2024-04-22 ENCOUNTER — VIRTUAL VISIT (OUTPATIENT)
Dept: BEHAVIORAL HEALTH | Facility: CLINIC | Age: 34
End: 2024-04-22
Payer: COMMERCIAL

## 2024-04-22 DIAGNOSIS — F41.1 GENERALIZED ANXIETY DISORDER: Primary | ICD-10-CM

## 2024-04-22 ASSESSMENT — COLUMBIA-SUICIDE SEVERITY RATING SCALE - C-SSRS
1. SINCE LAST CONTACT, HAVE YOU WISHED YOU WERE DEAD OR WISHED YOU COULD GO TO SLEEP AND NOT WAKE UP?: NO
TOTAL  NUMBER OF ABORTED OR SELF INTERRUPTED ATTEMPTS SINCE LAST CONTACT: NO
2. HAVE YOU ACTUALLY HAD ANY THOUGHTS OF KILLING YOURSELF?: NO
ATTEMPT SINCE LAST CONTACT: NO
TOTAL  NUMBER OF INTERRUPTED ATTEMPTS SINCE LAST CONTACT: NO
SUICIDE, SINCE LAST CONTACT: NO
6. HAVE YOU EVER DONE ANYTHING, STARTED TO DO ANYTHING, OR PREPARED TO DO ANYTHING TO END YOUR LIFE?: NO

## 2024-04-22 ASSESSMENT — PATIENT HEALTH QUESTIONNAIRE - PHQ9
SUM OF ALL RESPONSES TO PHQ QUESTIONS 1-9: 7
10. IF YOU CHECKED OFF ANY PROBLEMS, HOW DIFFICULT HAVE THESE PROBLEMS MADE IT FOR YOU TO DO YOUR WORK, TAKE CARE OF THINGS AT HOME, OR GET ALONG WITH OTHER PEOPLE: SOMEWHAT DIFFICULT
SUM OF ALL RESPONSES TO PHQ QUESTIONS 1-9: 7

## 2024-04-22 NOTE — PROGRESS NOTES
Transition Clinic Progress Note    Patient Name:   Jose Lizama Date: 2024          Service Type: Individual        VISIT TIME START: 730      VISIT TIME END: 815      Session Length:  TC Session Length: 45 (38-52 Minutes)    Session #:  9    Attendees: TC Attendees: Client alone    Service Modality:  Service Modality: Video Visit:      Provider verified identity through the following two step process.  Patient provided:  Patient  and Patient address    Telemedicine Visit: The patient's condition can be safely assessed and treated via synchronous audio and visual telemedicine encounter.      Reason for Telemedicine Visit: Patient convenience (e.g. access to timely appointments / distance to available provider)    Originating Site (Patient Location): Patient's home    Distant Site (Provider Location): [facility]    Consent:  The patient/guardian has verbally consented to: the potential risks and benefits of telemedicine (video visit) versus in person care; bill my insurance or make self-payment for services provided; and responsibility for payment of non-covered services.     Patient would like the video invitation sent by:  My Chart    Mode of Communication:  Video Conference via [facility]    Distant Location (Provider):  Off-site    As the provider I attest to compliance with applicable laws and regulations related to telemedicine.    Informed Consent and Assessment Methods    This provider and patient discussed HIPAA, and the limits of confidentiality; including mandated reporting, the possibility of collaborative discussions with patient's primary care provider and the multidisciplinary team in the MH clinic during consultation.  Discussed the no show policy, and the benefits and possible unintended consequences of therapy.  Patient indicated understanding Transition Clinic services are short term, solution focused, until a referral can be  made and patient can bridge to long term therapy.  Patient verbally indicated understanding the informed consent.        DATA  Interactive Complexity: No  Crisis: No        Progress Since Last Session (Related to Symptoms / Goals / Homework):   Symptoms: No change    Homework: Completed in session      Episode of Care Goals: Satisfactory progress - ACTION (Actively working towards change); Intervened by reinforcing change plan / affirming steps taken     Current / Ongoing Stressors and Concerns:   Pt reports some stressors over the past week associated with her family and interpersonal life.  Pt is insightful, reflective, pt processed regarding stressors.  Writer actively listened, provided empathy and validation.  Pt attempting to step back and conserve positive energy in her relationships.  Pt denies SI, HI, AH/VH, at this point in time.  Pt reports she is hopeful looking forward to the warmer weather.  Pt reports the following protective factors: supportive family, willingness to engage in therapy, hope for the future, attached to family/friends, embedded in a protective network, demonstrates reliability to personal/professional life.    Pt processed much of session regarding family and interpersonal.  Pt talked about alanon and forgiveness.  Pt and writer explored challenging negative thoughts, and locus of control.  Pt appeared engaged and receptive to the above interventions    Plan:  4/29/24 @ 8:30am.     Treatment Objective(s) Addressed in This Session:   Encourage identify three distraction and diversion activities and use those activities to decrease level of anxiety                Encourage self care and self-compassion-attempting to challenge negative thoughts     Intervention:      Cognitive Behavioral Therapy (CBT) - Discussed changing thoughts is the path to changing behaviors and feelings., Mindfulness Therapy - Cultivation of present-oriented, non-judgmental attitude. It helps nurtures great  awareness, clarity, and acceptance of reality., Motivational Interviewing - helping to find the motivation to make positive behavior changes., and Person-Centered Therapy - Actualizes potential and moves towards increased awareness, spontaneity, trust in self and inner directedness.    Assessments completed prior to visit:  The following assessments were completed by patient for this visit:  PHQ9:       4/16/2014     2:00 PM 1/29/2024     7:38 AM 2/5/2024    12:00 PM 2/12/2024    10:06 AM 2/12/2024    10:15 AM 3/18/2024     8:22 AM 4/22/2024     7:24 AM   PHQ-9 SCORE   PHQ-9 Total Score 9         PHQ-9 Total Score MyChart  8 (Mild depression) 11 (Moderate depression)  7 (Mild depression) 6 (Mild depression) 7 (Mild depression)   PHQ-9 Total Score  8 11 7 7 6 7     GAD7:       4/16/2014     2:56 PM 2/5/2024    12:01 PM 2/12/2024    10:06 AM 3/11/2024     2:29 PM   YAZMIN-7 SCORE   Total Score 11      Total Score  8 (mild anxiety)  6 (mild anxiety)   Total Score  8 8 6     Wilkes Suicide Severity Rating Scale (Short Version)      3/4/2024     9:00 AM 3/11/2024     3:00 PM 3/18/2024     9:00 AM 4/1/2024     9:00 AM 4/8/2024     4:00 PM 4/15/2024     9:00 AM 4/22/2024     8:00 AM   Wilkes Suicide Severity Rating (Short Version)   1. Wish to be Dead (Since Last Contact) N N N N N N N   2. Non-Specific Active Suicidal Thoughts (Since Last Contact) N N N N N N N   Actual Attempt (Since Last Contact) N N N N N N N   Has subject engaged in non-suicidal self-injurious behavior? (Since Last Contact) N N N N N N N   Interrupted Attempts (Since Last Contact) N N N N N N N   Aborted or Self-Interrupted Attempt (Since Last Contact) N N N N N N N   Preparatory Acts or Behavior (Since Last Contact) N N N N N N N   Suicide (Since Last Contact) N N N N N N N   Calculated C-SSRS Risk Score (Since Last Contact) No Risk Indicated No Risk Indicated No Risk Indicated No Risk Indicated No Risk Indicated No Risk Indicated No Risk Indicated            ASSESSMENT: Current Emotional / Mental Status (status of significant symptoms):   Risk status (Self / Other harm or suicidal ideation)   Patient denies current fears or concerns for personal safety.   Patient denies current or recent suicidal ideation or behaviors.   Patient denies current or recent homicidal ideation or behaviors.   Patient denies current or recent self injurious behavior or ideation.   Patient denies other safety concerns.   Patient reports there has been no change in risk factors since their last session.     Patient reports there has been no change in protective factors since their last session.     Recommended that patient call 911 or go to the local ED should there be a change in any of these risk factors.     Appearance:   Appropriate    Eye Contact:   Good    Psychomotor Behavior: Normal    Attitude:   Cooperative    Orientation:   All   Speech    Rate / Production: Normal     Volume:  Normal    Mood:    Normal   Affect:    Appropriate    Thought Content:  Clear    Thought Form:  Coherent  Logical    Insight:    Good      Medication Review:   No current psychiatric medications prescribed     Medication Compliance:   NA     Changes in Health Issues:   None reported     Chemical Use Review:   Substance Use: Chemical use reviewed, no active concerns identified      Tobacco Use: No current tobacco use.      Diagnoses:   300.02 (F41.1) Generalized Anxiety Disorder    Collateral Reports Completed:    Collateral: [facility] electronic medical records reviewed.    PLAN: (Patient Tasks / Therapist Tasks / Other)  Encourage self-care and compassion    Plan 4/29/24 @ 8:30 am    Procedure Code: Psychotherapy (with patient) - 45 [CPT 62701]    JOSE MIGUEL WARREN       Safety plan reviewed with pt, pt agrees to follow, safety plan made available to pt, safety plan at the bottom of this note, and in pts active mychart.    ____________________________________________________________________________________________________________________________  Treatment Plan     Patient's Name: Jose Lizama                YOB: 1990  Date of Creation: 2/26/24  Date Treatment Plan Last Reviewed/Revised: Initial     DSM5 Diagnoses: 300.02 (F41.1) Generalized Anxiety Disorder  Psychosocial / Contextual Factors: Pt with reported situational stressors.  PROMIS (reviewed every 90 days):      Referral / Collaboration:  N/A .     Anticipated number of session for this episode of care: 6-9 sessions  Anticipation frequency of session: Weekly  Anticipated Duration of each session: 38-52 minutes  Treatment plan will be reviewed in 90 days or when goals have been changed.         MeasurableTreatment Goal(s) related to diagnosis / functional impairment(s)  Goal 1: Patient will reduce overall frequency, intensity, and duration of the anxiety so that daily functioning is not impaired.       Objective #A                      Patient will describe situations, thoughts, feelings, and actions associated with anxieties and worries, their impact on functioning, and attempts to resolve them.      Intervention(s)      LMHP will focus on developing a level of trust with the patient; provide support and empathy to encourage the client to feel safe in expressing their symptoms.       LMHP will ask the patient to describe their past experiences of anxiety and their impact on functioning: assess the focus, excessiveness, and uncontrollability of the worry and the type, frequency, intensity, and duration of their anxiety symptoms.       Goal 2: Patient will learn and implement coping skills that result in a reduction of anxiety and worry and improved daily functioning.       Objective A                        Patient will learn and implement calming skills to reduce overall anxiety and manage anxiety symptoms      Objective B      Patient will verbalize  "understanding of the role that cognitive biases play in excessive irrational worry and persistent anxiety symptoms.       Intervention(s)      LMHP will teach the patient calming/relaxation skills and how to differentiate between relaxation and tension while teaching the patient how to implement these strategies in their daily life.       LMHP will assist the patient in analyzing their worries by examining cognitive distortions and the patient s ability to control their emotions surrounding cognitive distortions..        Patient has reviewed and agreed to the above plan.        MARIN FROST                February 25, 2024     ___________________________________________________________________________________________________________________________  Patient has no change in safety concerns. Committed to safety and agreed to follow previously developed safety plan.  Reviewed safety plan with pt, pt agreed to follow, safety plan made available to pt, safety plan at the bottom of this note and in pts active mychart.     Safety Plan       If I am feeling unsafe or I am in a crisis, I will:   -Contact my established care providers   -Call the National Suicide Prevention Lifeline: [phone]   -Go to the nearest emergency room   -Call 911      Warning signs that I or other people might notice when a crisis is developing for me:  increased \"big feelings\"  -Decreased appetite,    -Decreased sleep   -Restlessness   -Increased thoughts about wanting to die   -Increased irritability or agitation.       Things I am able to do on my own to cope or help me feel better:    -take a time-out. Practice yoga, listen to music, meditate, get a massage, or learn relaxation techniques. Stepping back from the problem helps clear your head.   -Eat well-balanced meals. Do not skip any meals. Do keep healthful, energy-boosting snacks on hand.   -Limit alcohol and caffeine   -Get enough sleep. When stressed, your body needs additional " sleep and rest.    -Exercise daily to help you feel good and maintain your health.   -Accept that you cannot control everything. Put your stress in perspective: Is it really as bad as you think?    -Welcome humor. A good laugh goes a long way.      Things that I am able to do with others to cope or help me better:    -Listen and talk about concerns   -Continue to follow with outpatient care providers and case management    -Go for a walk   -Distract with going out to eat, to a movie or a park.       Things I can use or do for distraction:    -Watch a TV show. If you don't have cable or a subscription service, many television networks offer free access, without a log-in, until you get closer to the most recent episodes.   -Watch a movie. Light-hearted comedy, drama to suck you in, or an old favorite - there are countless films to whisk you away for a bit.   -Sing. It doesn't matter if you're a professional vocalist or can't to carry a tune, singing engages a completely different part of your brain. Plus, the vibrations in your chest give great sensory feedback and the vocalization reminds you of your voice.   -Watch cute videos on [employer]. About as low-effort as it gets: puppy/kassy videos, laugh challenges, or Vine compilations - take your pick.  -Mindless doodles/finger painting/playing with marcial. This may be especially helpful to those with child parts (DID/OSDD) who need an activity of their own.   -Grab a snack.   -Drum on a surface. Like singing, the vibrations and bilateral stimulation of your hands thumping will engage different parts of your mind and bring your attention away from what's intruding on you.   -Play a game or use a fun evan on your phone. Even if you aren't a , search the evan store. You might find one that speaks to you. It can be a great escape to get lost in for a bit.   -Video games. Any console, any game!   -Tear out words/photos/etc for a collage. Ask a local doctor's office or  hairdresser for their spare magazines. Mindlessly rip out photos and words that speak to you. (Bonus: you may get to put tabloids to good use for once! They often have the scathing, overdramatic words that happen to be great for a therapeutic collages. Shocking! Betrayal! You Won't Believe It!).   -Discover new music. [facility], [employer], -[place], so many ways to find new gems!   -Wash your face/hands or brush your teeth. A quick refresher can help you restart your day on a brand new page.   -Re-watch highlights from your favorite sport. It's easy to forget just how many epic, captivating moments there were once some time has passed. Relive your excitement. Plus, you already know how it ends, so you don't have to pay super close attention!   -Gratitude list. When your mind only wants to remind you of distressing things, focusing on 10+ things you're grateful for can really take you to a whole new atmosphere in your mind and heart.  -Imagery exercises. Containment exercises, healing pool/healing light, guided meditation, so many options!   -Play a board game with a friend. Something simple like Sorry!, challenging like chess, or silly like Cards Against Humanity, there are lots of options to distract you in the company of friends.   -Card games. Solo works, too, if there's no one around.      Changes I can make to support my mental health and wellness:      1. Value yourself: Treat yourself with kindness and respect, and avoid self-criticism. Make time for your hobbies and favorite projects, or broaden your horizons. Do a daily crossword puzzle, plant a garden, take dance lessons, learn to play an instrument or become fluent in another language.      2. Take care of your body: Taking care of yourself physically can improve your mental health. Be sure to:   -Eat nutritious meals   -Avoid smoking and vaping-  -Drink plenty of water   -Exercise, which helps decrease depression and anxiety and improve moods   -Get enough  sleep. Researchers believe that lack of sleep contributes to a high rate of depression in college students.       3. Surround yourself with good people: People with strong family or social connections are generally healthier than those who lack a support network. Make plans with supportive family members and friends, or seek out activities where you can meet new people, such as a club, class or support group.      4. Give yourself: Volunteer your time and energy to help someone else. You'll feel good about doing something tangible to help someone in need -- and it's a great way to meet new people. See Fun and Cheap Things to do in [place] for ideas.      5. Learn how to deal with stress: Like it or not, stress is a part of life. Practice good coping skills: Try One-Minute Stress Strategies, do Rehan Chi, exercise, take a nature walk, play with your pet or try journal writing as a stress reducer. Also, remember to smile and see the humor in life. Research shows that laughter can boost your immune system, ease pain, relax your body and reduce stress.      6. Quiet your mind: Try meditating, Mindfulness and/or prayer. Relaxation exercises and prayer can improve your state of mind and outlook on life. In fact, research shows that meditation may help you feel calm and enhance the effects of therapy. To get connected, see spiritual resources on Personal Well-being for Students      7. Set realistic goals: Decide what you want to achieve academically, professionally and personally, and write down the steps you need to realize your goals. Aim high, but be realistic and don't over-schedule. You'll enjoy a tremendous sense of accomplishment and self-worth as you progress toward your goal. Wellness Coaching, free to [employer] students, can help you develop goals and stay on track.       8. Break up the monotony: Although our routines make us more efficient and enhance our feelings of security and safety, a little change of pace can  "perk up a tedious schedule. Alter your jogging route, plan a road-trip, take a walk in a different park, hang some new pictures or try a new restaurant.      9. Avoid alcohol and other drugs: Keep alcohol use to a minimum and avoid other drugs. Sometimes people use alcohol and other drugs to \"self-medicate\" but in reality, alcohol and other drugs only aggravate problems.      10. Get help when you need it: Seeking help is a sign of strength -- not a weakness. And it is important to remember that treatment is effective. People who get appropriate care can recover from mental illness and addiction and lead full, rewarding lives.      People in my life that I can ask for help: parents, friends  -Spouse   -Friends   -Therapist    -Other Mental health Supportive Services      Your [facility] has a mental health crisis team you can call 24/7:    Phone: [facility] Crisis line:  [phone]  "

## 2024-05-06 ENCOUNTER — VIRTUAL VISIT (OUTPATIENT)
Dept: BEHAVIORAL HEALTH | Facility: CLINIC | Age: 34
End: 2024-05-06
Payer: COMMERCIAL

## 2024-05-06 DIAGNOSIS — F41.1 GENERALIZED ANXIETY DISORDER: Primary | ICD-10-CM

## 2024-05-06 ASSESSMENT — COLUMBIA-SUICIDE SEVERITY RATING SCALE - C-SSRS
TOTAL  NUMBER OF INTERRUPTED ATTEMPTS SINCE LAST CONTACT: NO
2. HAVE YOU ACTUALLY HAD ANY THOUGHTS OF KILLING YOURSELF?: NO
SUICIDE, SINCE LAST CONTACT: NO
TOTAL  NUMBER OF ABORTED OR SELF INTERRUPTED ATTEMPTS SINCE LAST CONTACT: NO
ATTEMPT SINCE LAST CONTACT: NO
1. SINCE LAST CONTACT, HAVE YOU WISHED YOU WERE DEAD OR WISHED YOU COULD GO TO SLEEP AND NOT WAKE UP?: NO
6. HAVE YOU EVER DONE ANYTHING, STARTED TO DO ANYTHING, OR PREPARED TO DO ANYTHING TO END YOUR LIFE?: NO

## 2024-05-06 NOTE — PROGRESS NOTES
Transition Clinic Progress Note    Patient Name:   Jose Lizama Date: May 6, 2024          Service Type: Individual        VISIT TIME START: 1230pm      VISIT TIME END: 0115pm      Session Length:  TC Session Length: 45 (38-52 Minutes)    Session #:  10+    Attendees: TC Attendees: Client alone    Service Modality:  Service Modality: Video Visit:      Provider verified identity through the following two step process.  Patient provided:  Patient  and Patient address    Telemedicine Visit: The patient's condition can be safely assessed and treated via synchronous audio and visual telemedicine encounter.      Reason for Telemedicine Visit: Patient convenience (e.g. access to timely appointments / distance to available provider)    Originating Site (Patient Location): Patient's home    Distant Site (Provider Location): [facility]    Consent:  The patient/guardian has verbally consented to: the potential risks and benefits of telemedicine (video visit) versus in person care; bill my insurance or make self-payment for services provided; and responsibility for payment of non-covered services.     Patient would like the video invitation sent by:  My Chart    Mode of Communication:  Video Conference via [facility]    Distant Location (Provider):  Off-site    As the provider I attest to compliance with applicable laws and regulations related to telemedicine.    Informed Consent and Assessment Methods    This provider and patient discussed HIPAA, and the limits of confidentiality; including mandated reporting, the possibility of collaborative discussions with patient's primary care provider and the multidisciplinary team in the MH clinic during consultation.  Discussed the no show policy, and the benefits and possible unintended consequences of therapy.  Patient indicated understanding Transition Clinic services are short term, solution focused, until a referral can  be made and patient can bridge to long term therapy.  Patient verbally indicated understanding the informed consent.        DATA  Interactive Complexity: No  Crisis: No        Progress Since Last Session (Related to Symptoms / Goals / Homework):   Symptoms: Improving    Homework: Completed in session      Episode of Care Goals: Satisfactory progress - ACTION (Actively working towards change); Intervened by reinforcing change plan / affirming steps taken     Current / Ongoing Stressors and Concerns:   Pt reports improved sxs today, reports things going well with family and relationship at this point in time.  Pt denies excessive depression or anxiety sxs at this point in time.  Pt denies SI, HI, AH/VH at this point in time.  CSSR completed.  Pt reports she went to a music show and her partners graduation ceremony and had a good time.  Pt reports interest in a new skill, song writing, reports she wrote one and is connecting with family through this as well.  Pt insightful.  Pt reports she is hopeful, looking forward to walk with her pet today, reports its her pets birthday.  Pt reports the following protective factors: supportive family, willingness to engage in therapy, hope for the future, attached to family/friends, embedded in a protective network, demonstrates reliability to personal/professional life.     Pt processed most of session today regarding family, relationship and mild stressor.  Writer actively listened, provided , and validation.  Pt and writer reviewed LOCUS of control and challenging negative thoughts.  Pt and writer explored ACT skills including psychological flexibility, associated with adapting and pts values, focusing on detachment, acceptance, mindfulness, and resilience.  Pt appeared engaged and receptive to the above interventions.      Plan:  5/20/24 @ 8:30am        Treatment Objective(s) Addressed in This Session:   identify three distraction and diversion activities and use those  activities to decrease level of anxiety    Encourage LOCUS of control  Encourage Self-care     Intervention:      Brief Psychotherapy - discussed and prioritizing the most efficient treatment., Cognitive Behavioral Therapy (CBT) - Discussed changing thoughts is the path to changing behaviors and feelings., Mindfulness Therapy - Cultivation of present-oriented, non-judgmental attitude. It helps nurtures great awareness, clarity, and acceptance of reality., Motivational Interviewing - helping to find the motivation to make positive behavior changes., and Person-Centered Therapy - Actualizes potential and moves towards increased awareness, spontaneity, trust in self and inner directedness.    Assessments completed prior to visit:     [place] Suicide Severity Rating Scale (Short Version)      3/11/2024     3:00 PM 3/18/2024     9:00 AM 4/1/2024     9:00 AM 4/8/2024     4:00 PM 4/15/2024     9:00 AM 4/22/2024     8:00 AM 5/6/2024     1:00 PM   [place] Suicide Severity Rating (Short Version)   1. Wish to be Dead (Since Last Contact) N N N N N N N   2. Non-Specific Active Suicidal Thoughts (Since Last Contact) N N N N N N N   Actual Attempt (Since Last Contact) N N N N N N N   Has subject engaged in non-suicidal self-injurious behavior? (Since Last Contact) N N N N N N N   Interrupted Attempts (Since Last Contact) N N N N N N N   Aborted or Self-Interrupted Attempt (Since Last Contact) N N N N N N N   Preparatory Acts or Behavior (Since Last Contact) N N N N N N N   Suicide (Since Last Contact) N N N N N N N   Calculated C-SSRS Risk Score (Since Last Contact) No Risk Indicated No Risk Indicated No Risk Indicated No Risk Indicated No Risk Indicated No Risk Indicated No Risk Indicated          ASSESSMENT: Current Emotional / Mental Status (status of significant symptoms):   Risk status (Self / Other harm or suicidal ideation)   Patient denies current fears or concerns for personal safety.   Patient denies current or recent  suicidal ideation or behaviors.   Patient denies current or recent homicidal ideation or behaviors.   Patient denies current or recent self injurious behavior or ideation.   Patient denies other safety concerns.   Patient reports there has been no change in risk factors since their last session.     Patient reports there has been no change in protective factors since their last session.     Recommended that patient call 911 or go to the local ED should there be a change in any of these risk factors.     Appearance:   Appropriate    Eye Contact:   Good    Psychomotor Behavior: Normal    Attitude:   Cooperative    Orientation:   All   Speech    Rate / Production: Normal     Volume:  Normal    Mood:    Normal   Affect:    Appropriate    Thought Content:  Clear    Thought Form:  Coherent  Logical    Insight:    Good      Medication Review:   No changes to current psychiatric medication(s)     Medication Compliance:   NA     Changes in Health Issues:   None reported     Chemical Use Review:   Substance Use: Chemical use reviewed, no active concerns identified      Tobacco Use: No current tobacco use.      Diagnoses:   300.02 (F41.1) Generalized Anxiety Disorder    Collateral Reports Completed:    Collateral: [facility] electronic medical records reviewed.    PLAN: (Patient Tasks / Therapist Tasks / Other)  Encourage challenge negative thoughts, replace with alternative  Encourage self-care    Plan:  5/20/24 @ 8;30am    Procedure Code: Psychotherapy (with patient) - 45 [CPT 65101]    MARIN FROST, [facility]                                                         ______________________________________________________________________  Safety plan reviewed with pt, pt agrees to follow, safety plan made available to pt, safety plan at the bottom of this note, and in pts active mychart.   ____________________________________________________________________________________________________________________________  Treatment  Plan     Patient's Name: Jose Lizama                YOB: 1990  Date of Creation: 2/26/24  Date Treatment Plan Last Reviewed/Revised: Initial     DSM5 Diagnoses: 300.02 (F41.1) Generalized Anxiety Disorder  Psychosocial / Contextual Factors: Pt with reported situational stressors.  PROMIS (reviewed every 90 days):      Referral / Collaboration:  N/A .     Anticipated number of session for this episode of care: 6-9 sessions  Anticipation frequency of session: Weekly  Anticipated Duration of each session: 38-52 minutes  Treatment plan will be reviewed in 90 days or when goals have been changed.         MeasurableTreatment Goal(s) related to diagnosis / functional impairment(s)  Goal 1: Patient will reduce overall frequency, intensity, and duration of the anxiety so that daily functioning is not impaired.       Objective #A                      Patient will describe situations, thoughts, feelings, and actions associated with anxieties and worries, their impact on functioning, and attempts to resolve them.      Intervention(s)      LMHP will focus on developing a level of trust with the patient; provide support and empathy to encourage the client to feel safe in expressing their symptoms.       LMHP will ask the patient to describe their past experiences of anxiety and their impact on functioning: assess the focus, excessiveness, and uncontrollability of the worry and the type, frequency, intensity, and duration of their anxiety symptoms.       Goal 2: Patient will learn and implement coping skills that result in a reduction of anxiety and worry and improved daily functioning.       Objective A                        Patient will learn and implement calming skills to reduce overall anxiety and manage anxiety symptoms      Objective B      Patient will verbalize understanding of the role that cognitive biases play in excessive irrational worry and persistent anxiety symptoms.       Intervention(s)     "  LMHP will teach the patient calming/relaxation skills and how to differentiate between relaxation and tension while teaching the patient how to implement these strategies in their daily life.       LMHP will assist the patient in analyzing their worries by examining cognitive distortions and the patient s ability to control their emotions surrounding cognitive distortions..        Patient has reviewed and agreed to the above plan.        MARIN FROST                February 25, 2024     ___________________________________________________________________________________________________________________________  Patient has no change in safety concerns. Committed to safety and agreed to follow previously developed safety plan.  Reviewed safety plan with pt, pt agreed to follow, safety plan made available to pt, safety plan at the bottom of this note and in pts active mychart.     Safety Plan       If I am feeling unsafe or I am in a crisis, I will:   -Contact my established care providers   -Call the National Suicide Prevention Lifeline: [phone]   -Go to the nearest emergency room   -Call 911      Warning signs that I or other people might notice when a crisis is developing for me:  increased \"big feelings\"  -Decreased appetite,    -Decreased sleep   -Restlessness   -Increased thoughts about wanting to die   -Increased irritability or agitation.       Things I am able to do on my own to cope or help me feel better:    -take a time-out. Practice yoga, listen to music, meditate, get a massage, or learn relaxation techniques. Stepping back from the problem helps clear your head.   -Eat well-balanced meals. Do not skip any meals. Do keep healthful, energy-boosting snacks on hand.   -Limit alcohol and caffeine   -Get enough sleep. When stressed, your body needs additional sleep and rest.    -Exercise daily to help you feel good and maintain your health.   -Accept that you cannot control everything. Put your stress " in perspective: Is it really as bad as you think?    -Welcome humor. A good laugh goes a long way.      Things that I am able to do with others to cope or help me better:    -Listen and talk about concerns   -Continue to follow with outpatient care providers and case management    -Go for a walk   -Distract with going out to eat, to a movie or a park.       Things I can use or do for distraction:    -Watch a TV show. If you don't have cable or a subscription service, many television networks offer free access, without a log-in, until you get closer to the most recent episodes.   -Watch a movie. Light-hearted comedy, drama to suck you in, or an old favorite - there are countless films to whisk you away for a bit.   -Sing. It doesn't matter if you're a professional vocalist or can't to carry a tune, singing engages a completely different part of your brain. Plus, the vibrations in your chest give great sensory feedback and the vocalization reminds you of your voice.   -Watch cute videos on [employer]. About as low-effort as it gets: puppy/kassy videos, laugh challenges, or Vine compilations - take your pick.  -Mindless doodles/finger painting/playing with marcial. This may be especially helpful to those with child parts (DID/OSDD) who need an activity of their own.   -Grab a snack.   -Drum on a surface. Like singing, the vibrations and bilateral stimulation of your hands thumping will engage different parts of your mind and bring your attention away from what's intruding on you.   -Play a game or use a fun evan on your phone. Even if you aren't a , search the eavn store. You might find one that speaks to you. It can be a great escape to get lost in for a bit.   -Video games. Any console, any game!   -Tear out words/photos/etc for a collage. Ask a local doctor's office or hairdresser for their spare magazines. Mindlessly rip out photos and words that speak to you. (Bonus: you may get to put tabloids to good use for once!  They often have the scathing, overdramatic words that happen to be great for a therapeutic collages. Shocking! Betrayal! You Won't Believe It!).   -Discover new music. YouTube, Spotify, -[place], so many ways to find new gems!   -Wash your face/hands or brush your teeth. A quick refresher can help you restart your day on a brand new page.   -Re-watch highlights from your favorite sport. It's easy to forget just how many epic, captivating moments there were once some time has passed. Relive your excitement. Plus, you already know how it ends, so you don't have to pay super close attention!   -Gratitude list. When your mind only wants to remind you of distressing things, focusing on 10+ things you're grateful for can really take you to a whole new atmosphere in your mind and heart.  -Imagery exercises. Containment exercises, healing pool/healing light, guided meditation, so many options!   -Play a board game with a friend. Something simple like Sorry!, challenging like chess, or silly like Cards Against Humanity, there are lots of options to distract you in the company of friends.   -Card games. Solo works, too, if there's no one around.      Changes I can make to support my mental health and wellness:      1. Value yourself: Treat yourself with kindness and respect, and avoid self-criticism. Make time for your hobbies and favorite projects, or broaden your horizons. Do a daily crossword puzzle, plant a garden, take dance lessons, learn to play an instrument or become fluent in another language.      2. Take care of your body: Taking care of yourself physically can improve your mental health. Be sure to:   -Eat nutritious meals   -Avoid smoking and vaping-  -Drink plenty of water   -Exercise, which helps decrease depression and anxiety and improve moods   -Get enough sleep. Researchers believe that lack of sleep contributes to a high rate of depression in college students.       3. Surround yourself with good people:  People with strong family or social connections are generally healthier than those who lack a support network. Make plans with supportive family members and friends, or seek out activities where you can meet new people, such as a club, class or support group.      4. Give yourself: Volunteer your time and energy to help someone else. You'll feel good about doing something tangible to help someone in need -- and it's a great way to meet new people. See Fun and Cheap Things to do in [place] for ideas.      5. Learn how to deal with stress: Like it or not, stress is a part of life. Practice good coping skills: Try One-Minute Stress Strategies, do Rehan Chi, exercise, take a nature walk, play with your pet or try journal writing as a stress reducer. Also, remember to smile and see the humor in life. Research shows that laughter can boost your immune system, ease pain, relax your body and reduce stress.      6. Quiet your mind: Try meditating, Mindfulness and/or prayer. Relaxation exercises and prayer can improve your state of mind and outlook on life. In fact, research shows that meditation may help you feel calm and enhance the effects of therapy. To get connected, see spiritual resources on Personal Well-being for Students      7. Set realistic goals: Decide what you want to achieve academically, professionally and personally, and write down the steps you need to realize your goals. Aim high, but be realistic and don't over-schedule. You'll enjoy a tremendous sense of accomplishment and self-worth as you progress toward your goal. Wellness Coaching, free to [employer] students, can help you develop goals and stay on track.       8. Break up the monotony: Although our routines make us more efficient and enhance our feelings of security and safety, a little change of pace can perk up a tedious schedule. Alter your jogging route, plan a road-trip, take a walk in a different park, hang some new pictures or try a new restaurant.  "     9. Avoid alcohol and other drugs: Keep alcohol use to a minimum and avoid other drugs. Sometimes people use alcohol and other drugs to \"self-medicate\" but in reality, alcohol and other drugs only aggravate problems.      10. Get help when you need it: Seeking help is a sign of strength -- not a weakness. And it is important to remember that treatment is effective. People who get appropriate care can recover from mental illness and addiction and lead full, rewarding lives.      People in my life that I can ask for help: parents, friends  -Spouse   -Friends   -Therapist    -Other Mental health Supportive Services      Your [facility] has a mental health crisis team you can call 24/7:    Phone: [facility] Crisis line:  [phone]    "

## 2024-05-20 ENCOUNTER — THERAPY VISIT (OUTPATIENT)
Dept: PHYSICAL THERAPY | Facility: CLINIC | Age: 34
End: 2024-05-20
Payer: COMMERCIAL

## 2024-05-20 ENCOUNTER — VIRTUAL VISIT (OUTPATIENT)
Dept: BEHAVIORAL HEALTH | Facility: CLINIC | Age: 34
End: 2024-05-20
Payer: COMMERCIAL

## 2024-05-20 DIAGNOSIS — M79.18 MYALGIA OF PELVIC FLOOR: Primary | ICD-10-CM

## 2024-05-20 DIAGNOSIS — F41.1 GENERALIZED ANXIETY DISORDER: Primary | ICD-10-CM

## 2024-05-20 PROCEDURE — 97530 THERAPEUTIC ACTIVITIES: CPT | Mod: GP | Performed by: PHYSICAL THERAPIST

## 2024-05-20 PROCEDURE — 97110 THERAPEUTIC EXERCISES: CPT | Mod: GP | Performed by: PHYSICAL THERAPIST

## 2024-05-20 ASSESSMENT — COLUMBIA-SUICIDE SEVERITY RATING SCALE - C-SSRS
ATTEMPT SINCE LAST CONTACT: NO
2. HAVE YOU ACTUALLY HAD ANY THOUGHTS OF KILLING YOURSELF?: NO
TOTAL  NUMBER OF ABORTED OR SELF INTERRUPTED ATTEMPTS SINCE LAST CONTACT: NO
TOTAL  NUMBER OF INTERRUPTED ATTEMPTS SINCE LAST CONTACT: NO
SUICIDE, SINCE LAST CONTACT: NO
6. HAVE YOU EVER DONE ANYTHING, STARTED TO DO ANYTHING, OR PREPARED TO DO ANYTHING TO END YOUR LIFE?: NO
1. SINCE LAST CONTACT, HAVE YOU WISHED YOU WERE DEAD OR WISHED YOU COULD GO TO SLEEP AND NOT WAKE UP?: NO

## 2024-05-20 ASSESSMENT — ANXIETY QUESTIONNAIRES
4. TROUBLE RELAXING: SEVERAL DAYS
GAD7 TOTAL SCORE: 7
3. WORRYING TOO MUCH ABOUT DIFFERENT THINGS: SEVERAL DAYS
IF YOU CHECKED OFF ANY PROBLEMS ON THIS QUESTIONNAIRE, HOW DIFFICULT HAVE THESE PROBLEMS MADE IT FOR YOU TO DO YOUR WORK, TAKE CARE OF THINGS AT HOME, OR GET ALONG WITH OTHER PEOPLE: SOMEWHAT DIFFICULT
GAD7 TOTAL SCORE: 7
8. IF YOU CHECKED OFF ANY PROBLEMS, HOW DIFFICULT HAVE THESE MADE IT FOR YOU TO DO YOUR WORK, TAKE CARE OF THINGS AT HOME, OR GET ALONG WITH OTHER PEOPLE?: SOMEWHAT DIFFICULT
1. FEELING NERVOUS, ANXIOUS, OR ON EDGE: SEVERAL DAYS
7. FEELING AFRAID AS IF SOMETHING AWFUL MIGHT HAPPEN: SEVERAL DAYS
6. BECOMING EASILY ANNOYED OR IRRITABLE: SEVERAL DAYS
GAD7 TOTAL SCORE: 7
7. FEELING AFRAID AS IF SOMETHING AWFUL MIGHT HAPPEN: SEVERAL DAYS
5. BEING SO RESTLESS THAT IT IS HARD TO SIT STILL: SEVERAL DAYS
2. NOT BEING ABLE TO STOP OR CONTROL WORRYING: SEVERAL DAYS

## 2024-05-20 ASSESSMENT — PATIENT HEALTH QUESTIONNAIRE - PHQ9
10. IF YOU CHECKED OFF ANY PROBLEMS, HOW DIFFICULT HAVE THESE PROBLEMS MADE IT FOR YOU TO DO YOUR WORK, TAKE CARE OF THINGS AT HOME, OR GET ALONG WITH OTHER PEOPLE: SOMEWHAT DIFFICULT
SUM OF ALL RESPONSES TO PHQ QUESTIONS 1-9: 5
SUM OF ALL RESPONSES TO PHQ QUESTIONS 1-9: 5

## 2024-05-20 NOTE — PROGRESS NOTES
Transition Clinic Progress Note    Patient Name:   Jose Lizama Date: May 20, 2024          Service Type: Individual        VISIT TIME START: 900 start       VISIT TIME END: 930 End     Session Length:  TC Session Length: 30 (16-37 Minutes)    Session #:  10+    Attendees: TC Attendees: Client alone    Service Modality:  Service Modality: Video Visit:      Provider verified identity through the following two step process.  Patient provided:  Patient  and Patient address    Telemedicine Visit: The patient's condition can be safely assessed and treated via synchronous audio and visual telemedicine encounter.      Reason for Telemedicine Visit: Patient convenience (e.g. access to timely appointments / distance to available provider)    Originating Site (Patient Location): Patient's home    Distant Site (Provider Location): [facility]    Consent:  The patient/guardian has verbally consented to: the potential risks and benefits of telemedicine (video visit) versus in person care; bill my insurance or make self-payment for services provided; and responsibility for payment of non-covered services.     Patient would like the video invitation sent by:  My Chart    Mode of Communication:  Video Conference via [facility]    Distant Location (Provider):  Off-site    As the provider I attest to compliance with applicable laws and regulations related to telemedicine.    Informed Consent and Assessment Methods    This provider and patient discussed HIPAA, and the limits of confidentiality; including mandated reporting, the possibility of collaborative discussions with patient's primary care provider and the multidisciplinary team in the MH clinic during consultation.  Discussed the no show policy, and the benefits and possible unintended consequences of therapy.  Patient indicated understanding Transition Clinic services are short term, solution focused, until a  referral can be made and patient can bridge to long term therapy.  Patient verbally indicated understanding the informed consent.        DATA  Interactive Complexity: No  Crisis: No        Progress Since Last Session (Related to Symptoms / Goals / Homework):   Symptoms: Improving pt reports minimal sxs today, screeners appear congruent.  Homework: Completed in session      Episode of Care Goals: Satisfactory progress - ACTION (Actively working towards change); Intervened by reinforcing change plan / affirming steps taken     Current / Ongoing Stressors and Concerns:   Pt calm and pleasant today.  Pt reports minimal sxs today, screeners appear congruent.  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  Pt reports things with her family as stable.  Pt reports consistent communication with her mom.  Pt denies current SI, plan, intent, SIB, AH/VH, and/or jeovany sxs at this point in time.  Pt reports adequate supports, medication compliance.  Pt reports she is hopeful and future-oriented, looking forward to seeing her mother for mothers day present.  Pt reports the following protective factors: supportive family, willingness to engage in therapy, hope for the future, attached to family/friends, embedded in a protective network, demonstrates reliability to personal/professional life.     Pt processed much of session today regarding family, relationships, social leisure.  Writer actively listened, provided empathy and validation.  Pt reports she wrote a song as well as picked up floral painting.  Pt and writer explored locus of control, challenging negative thoughts, and self-care.  Pt appeared and receptive to the above interventions.      Plan:  6/3/24 @ 8:30am     Treatment Objective(s) Addressed in This Session:   identify three distraction and diversion activities and use those activities to decrease level of anxiety    Encourage use of coping skill log and  self care.     Intervention:      Cognitive Behavioral Therapy (CBT) - Discussed changing thoughts is the path to changing behaviors and feelings., Mindfulness Therapy - Cultivation of present-oriented, non-judgmental attitude. It helps nurtures great awareness, clarity, and acceptance of reality., Motivational Interviewing - helping to find the motivation to make positive behavior changes., and Person-Centered Therapy - Actualizes potential and moves towards increased awareness, spontaneity, trust in self and inner directedness.    Assessments completed prior to visit:  The following assessments were completed by patient for this visit:  PHQ9:       1/29/2024     7:38 AM 2/5/2024    12:00 PM 2/12/2024    10:06 AM 2/12/2024    10:15 AM 3/18/2024     8:22 AM 4/22/2024     7:24 AM 5/20/2024     8:24 AM   PHQ-9 SCORE   PHQ-9 Total Score MyChart 8 (Mild depression) 11 (Moderate depression)  7 (Mild depression) 6 (Mild depression) 7 (Mild depression) 5 (Mild depression)   PHQ-9 Total Score 8 11 7 7 6 7 5     GAD7:       4/16/2014     2:56 PM 2/5/2024    12:01 PM 2/12/2024    10:06 AM 3/11/2024     2:29 PM 5/20/2024     8:25 AM   YAZMIN-7 SCORE   Total Score 11       Total Score  8 (mild anxiety)  6 (mild anxiety) 7 (mild anxiety)   Total Score  8 8 6 7      [place] Suicide Severity Rating Scale (Short Version)      3/18/2024     9:00 AM 4/1/2024     9:00 AM 4/8/2024     4:00 PM 4/15/2024     9:00 AM 4/22/2024     8:00 AM 5/6/2024     1:00 PM 5/20/2024     9:00 AM   [place] Suicide Severity Rating (Short Version)   1. Wish to be Dead (Since Last Contact) N N N N N N N   2. Non-Specific Active Suicidal Thoughts (Since Last Contact) N N N N N N N   Actual Attempt (Since Last Contact) N N N N N N N   Has subject engaged in non-suicidal self-injurious behavior? (Since Last Contact) N N N N N N N   Interrupted Attempts (Since Last Contact) N N N N N N N   Aborted or Self-Interrupted Attempt (Since Last Contact) N N N N N N N    Preparatory Acts or Behavior (Since Last Contact) N N N N N N N   Suicide (Since Last Contact) N N N N N N N   Calculated C-SSRS Risk Score (Since Last Contact) No Risk Indicated No Risk Indicated No Risk Indicated No Risk Indicated No Risk Indicated No Risk Indicated No Risk Indicated          ASSESSMENT: Current Emotional / Mental Status (status of significant symptoms):   Risk status (Self / Other harm or suicidal ideation)   Patient denies current fears or concerns for personal safety.   Patient denies current or recent suicidal ideation or behaviors.   Patient denies current or recent homicidal ideation or behaviors.   Patient denies current or recent self injurious behavior or ideation.   Patient denies other safety concerns.   Patient reports there has been no change in risk factors since their last session.     Patient reports there has been no change in protective factors since their last session.     Recommended that patient call 911 or go to the local ED should there be a change in any of these risk factors.     Appearance:   Appropriate    Eye Contact:   Good    Psychomotor Behavior: Normal    Attitude:   Cooperative    Orientation:   All   Speech    Rate / Production: Normal     Volume:  Normal    Mood:    Normal   Affect:    Appropriate    Thought Content:  Clear    Thought Form:  Coherent  Logical    Insight:    Good      Medication Review:   No changes to current psychiatric medication(s)     Medication Compliance:   Yes     Changes in Health Issues:   None reported     Chemical Use Review:   Substance Use: Chemical use reviewed, no active concerns identified      Tobacco Use: No current tobacco use.      Diagnoses:   300.02 (F41.1) Generalized Anxiety Disorder    Collateral Reports Completed:    Collateral: [facility] electronic medical records reviewed.    PLAN: (Patient Tasks / Therapist Tasks / Other)  Plan:  6/3/24 @ 8:30am    Is this the patient's last discharge?: No    Procedure  Code: Psychotherapy (with patient) - 30  [CPT 50385]    MARIN FROSTJOSE MIGUEL     Safety plan reviewed with pt, pt agrees to follow, safety plan made available to pt, safety plan at the bottom of this note, and in pts active mychart.   ____________________________________________________________________________________________________________________________  Treatment Plan     Patient's Name: Jose Lizama                YOB: 1990  Date of Creation: 2/26/24  Date Treatment Plan Last Reviewed/Revised: Initial     DSM5 Diagnoses: 300.02 (F41.1) Generalized Anxiety Disorder  Psychosocial / Contextual Factors: Pt with reported situational stressors.  PROMIS (reviewed every 90 days):      Referral / Collaboration:  N/A .     Anticipated number of session for this episode of care: 6-9 sessions  Anticipation frequency of session: Weekly  Anticipated Duration of each session: 38-52 minutes  Treatment plan will be reviewed in 90 days or when goals have been changed.         MeasurableTreatment Goal(s) related to diagnosis / functional impairment(s)  Goal 1: Patient will reduce overall frequency, intensity, and duration of the anxiety so that daily functioning is not impaired.       Objective #A                      Patient will describe situations, thoughts, feelings, and actions associated with anxieties and worries, their impact on functioning, and attempts to resolve them.      Intervention(s)      LMHP will focus on developing a level of trust with the patient; provide support and empathy to encourage the client to feel safe in expressing their symptoms.       LMHP will ask the patient to describe their past experiences of anxiety and their impact on functioning: assess the focus, excessiveness, and uncontrollability of the worry and the type, frequency, intensity, and duration of their anxiety symptoms.       Goal 2: Patient will learn and implement coping skills that result in a reduction of anxiety  "and worry and improved daily functioning.       Objective A                        Patient will learn and implement calming skills to reduce overall anxiety and manage anxiety symptoms      Objective B      Patient will verbalize understanding of the role that cognitive biases play in excessive irrational worry and persistent anxiety symptoms.       Intervention(s)      LMHP will teach the patient calming/relaxation skills and how to differentiate between relaxation and tension while teaching the patient how to implement these strategies in their daily life.       LMHP will assist the patient in analyzing their worries by examining cognitive distortions and the patient s ability to control their emotions surrounding cognitive distortions..        Patient has reviewed and agreed to the above plan.        MARIN HUNTING                February 25, 2024     ___________________________________________________________________________________________________________________________  Patient has no change in safety concerns. Committed to safety and agreed to follow previously developed safety plan.  Reviewed safety plan with pt, pt agreed to follow, safety plan made available to pt, safety plan at the bottom of this note and in pts active mychart.     Safety Plan       If I am feeling unsafe or I am in a crisis, I will:   -Contact my established care providers   -Call the National Suicide Prevention Lifeline: [phone]   -Go to the nearest emergency room   -Call [phone]      Warning signs that I or other people might notice when a crisis is developing for me:  increased \"big feelings\"  -Decreased appetite,    -Decreased sleep   -Restlessness   -Increased thoughts about wanting to die   -Increased irritability or agitation.       Things I am able to do on my own to cope or help me feel better:    -take a time-out. Practice yoga, listen to music, meditate, get a massage, or learn relaxation techniques. Stepping back from the " problem helps clear your head.   -Eat well-balanced meals. Do not skip any meals. Do keep healthful, energy-boosting snacks on hand.   -Limit alcohol and caffeine   -Get enough sleep. When stressed, your body needs additional sleep and rest.    -Exercise daily to help you feel good and maintain your health.   -Accept that you cannot control everything. Put your stress in perspective: Is it really as bad as you think?    -Welcome humor. A good laugh goes a long way.      Things that I am able to do with others to cope or help me better:    -Listen and talk about concerns   -Continue to follow with outpatient care providers and case management    -Go for a walk   -Distract with going out to eat, to a movie or a park.       Things I can use or do for distraction:    -Watch a TV show. If you don't have cable or a subscription service, many television networks offer free access, without a log-in, until you get closer to the most recent episodes.   -Watch a movie. Light-hearted comedy, drama to suck you in, or an old favorite - there are countless films to whisk you away for a bit.   -Sing. It doesn't matter if you're a professional vocalist or can't to carry a tune, singing engages a completely different part of your brain. Plus, the vibrations in your chest give great sensory feedback and the vocalization reminds you of your voice.   -Watch cute videos on [employer]. About as low-effort as it gets: puppy/kassy videos, laugh challenges, or Vine compilations - take your pick.  -Mindless doodles/finger painting/playing with marcial. This may be especially helpful to those with child parts (DID/OSDD) who need an activity of their own.   -Grab a snack.   -Drum on a surface. Like singing, the vibrations and bilateral stimulation of your hands thumping will engage different parts of your mind and bring your attention away from what's intruding on you.   -Play a game or use a fun evan on your phone. Even if you aren't a , search  the evan store. You might find one that speaks to you. It can be a great escape to get lost in for a bit.   -Video games. Any console, any game!   -Tear out words/photos/etc for a collage. Ask a local doctor's office or hairdresser for their spare magazines. Mindlessly rip out photos and words that speak to you. (Bonus: you may get to put tabloids to good use for once! They often have the scathing, overdramatic words that happen to be great for a therapeutic collages. Shocking! Betrayal! You Won't Believe It!).   -Discover new music. [address], [employer], -[employer], so many ways to find new gems!   -Wash your face/hands or brush your teeth. A quick refresher can help you restart your day on a brand new page.   -Re-watch highlights from your favorite sport. It's easy to forget just how many epic, captivating moments there were once some time has passed. Relive your excitement. Plus, you already know how it ends, so you don't have to pay super close attention!   -Gratitude list. When your mind only wants to remind you of distressing things, focusing on 10+ things you're grateful for can really take you to a whole new atmosphere in your mind and heart.  -Imagery exercises. Containment exercises, healing pool/healing light, guided meditation, so many options!   -Play a board game with a friend. Something simple like Sorry!, challenging like chess, or silly like Cards Against Humanity, there are lots of options to distract you in the company of friends.   -Card games. Solo works, too, if there's no one around.      Changes I can make to support my mental health and wellness:      1. Value yourself: Treat yourself with kindness and respect, and avoid self-criticism. Make time for your hobbies and favorite projects, or broaden your horizons. Do a daily crossword puzzle, plant a garden, take dance lessons, learn to play an instrument or become fluent in another language.      2. Take care of your body: Taking care of yourself  physically can improve your mental health. Be sure to:   -Eat nutritious meals   -Avoid smoking and vaping-  -Drink plenty of water   -Exercise, which helps decrease depression and anxiety and improve moods   -Get enough sleep. Researchers believe that lack of sleep contributes to a high rate of depression in college students.       3. Surround yourself with good people: People with strong family or social connections are generally healthier than those who lack a support network. Make plans with supportive family members and friends, or seek out activities where you can meet new people, such as a club, class or support group.      4. Give yourself: Volunteer your time and energy to help someone else. You'll feel good about doing something tangible to help someone in need -- and it's a great way to meet new people. See Fun and Cheap Things to do in [place] for ideas.      5. Learn how to deal with stress: Like it or not, stress is a part of life. Practice good coping skills: Try One-Minute Stress Strategies, do Rehan Chi, exercise, take a nature walk, play with your pet or try journal writing as a stress reducer. Also, remember to smile and see the humor in life. Research shows that laughter can boost your immune system, ease pain, relax your body and reduce stress.      6. Quiet your mind: Try meditating, Mindfulness and/or prayer. Relaxation exercises and prayer can improve your state of mind and outlook on life. In fact, research shows that meditation may help you feel calm and enhance the effects of therapy. To get connected, see spiritual resources on Personal Well-being for Students      7. Set realistic goals: Decide what you want to achieve academically, professionally and personally, and write down the steps you need to realize your goals. Aim high, but be realistic and don't over-schedule. You'll enjoy a tremendous sense of accomplishment and self-worth as you progress toward your goal. Wellness Coaching,  "free to [employer] students, can help you develop goals and stay on track.       8. Break up the monotony: Although our routines make us more efficient and enhance our feelings of security and safety, a little change of pace can perk up a tedious schedule. Alter your jogging route, plan a road-trip, take a walk in a different park, hang some new pictures or try a new restaurant.      9. Avoid alcohol and other drugs: Keep alcohol use to a minimum and avoid other drugs. Sometimes people use alcohol and other drugs to \"self-medicate\" but in reality, alcohol and other drugs only aggravate problems.      10. Get help when you need it: Seeking help is a sign of strength -- not a weakness. And it is important to remember that treatment is effective. People who get appropriate care can recover from mental illness and addiction and lead full, rewarding lives.      People in my life that I can ask for help: parents, friends  -Spouse   -Friends   -Therapist    -Other Mental health Supportive Services   "

## 2024-05-24 DIAGNOSIS — L30.9 CHRONIC DERMATITIS OF HANDS: Primary | ICD-10-CM

## 2024-05-28 DIAGNOSIS — L30.9 CHRONIC DERMATITIS OF HANDS: Primary | ICD-10-CM

## 2024-05-28 DIAGNOSIS — L30.9 FOOT DERMATITIS: ICD-10-CM

## 2024-06-02 ENCOUNTER — HEALTH MAINTENANCE LETTER (OUTPATIENT)
Age: 34
End: 2024-06-02

## 2024-06-03 ENCOUNTER — VIRTUAL VISIT (OUTPATIENT)
Dept: BEHAVIORAL HEALTH | Facility: CLINIC | Age: 34
End: 2024-06-03
Payer: COMMERCIAL

## 2024-06-03 DIAGNOSIS — F41.1 GENERALIZED ANXIETY DISORDER: Primary | ICD-10-CM

## 2024-06-03 PROCEDURE — 99207 PR DROP WITH A PROCEDURE: CPT | Mod: 95

## 2024-06-03 ASSESSMENT — COLUMBIA-SUICIDE SEVERITY RATING SCALE - C-SSRS
SUICIDE, SINCE LAST CONTACT: NO
TOTAL  NUMBER OF INTERRUPTED ATTEMPTS SINCE LAST CONTACT: NO
1. SINCE LAST CONTACT, HAVE YOU WISHED YOU WERE DEAD OR WISHED YOU COULD GO TO SLEEP AND NOT WAKE UP?: NO
2. HAVE YOU ACTUALLY HAD ANY THOUGHTS OF KILLING YOURSELF?: NO
6. HAVE YOU EVER DONE ANYTHING, STARTED TO DO ANYTHING, OR PREPARED TO DO ANYTHING TO END YOUR LIFE?: NO
ATTEMPT SINCE LAST CONTACT: NO
TOTAL  NUMBER OF ABORTED OR SELF INTERRUPTED ATTEMPTS SINCE LAST CONTACT: NO

## 2024-06-03 ASSESSMENT — ANXIETY QUESTIONNAIRES
IF YOU CHECKED OFF ANY PROBLEMS ON THIS QUESTIONNAIRE, HOW DIFFICULT HAVE THESE PROBLEMS MADE IT FOR YOU TO DO YOUR WORK, TAKE CARE OF THINGS AT HOME, OR GET ALONG WITH OTHER PEOPLE: SOMEWHAT DIFFICULT
7. FEELING AFRAID AS IF SOMETHING AWFUL MIGHT HAPPEN: NOT AT ALL
4. TROUBLE RELAXING: SEVERAL DAYS
7. FEELING AFRAID AS IF SOMETHING AWFUL MIGHT HAPPEN: NOT AT ALL
3. WORRYING TOO MUCH ABOUT DIFFERENT THINGS: SEVERAL DAYS
GAD7 TOTAL SCORE: 4
GAD7 TOTAL SCORE: 4
6. BECOMING EASILY ANNOYED OR IRRITABLE: SEVERAL DAYS
2. NOT BEING ABLE TO STOP OR CONTROL WORRYING: NOT AT ALL
5. BEING SO RESTLESS THAT IT IS HARD TO SIT STILL: NOT AT ALL
8. IF YOU CHECKED OFF ANY PROBLEMS, HOW DIFFICULT HAVE THESE MADE IT FOR YOU TO DO YOUR WORK, TAKE CARE OF THINGS AT HOME, OR GET ALONG WITH OTHER PEOPLE?: SOMEWHAT DIFFICULT
1. FEELING NERVOUS, ANXIOUS, OR ON EDGE: SEVERAL DAYS
GAD7 TOTAL SCORE: 4

## 2024-06-03 ASSESSMENT — PATIENT HEALTH QUESTIONNAIRE - PHQ9
10. IF YOU CHECKED OFF ANY PROBLEMS, HOW DIFFICULT HAVE THESE PROBLEMS MADE IT FOR YOU TO DO YOUR WORK, TAKE CARE OF THINGS AT HOME, OR GET ALONG WITH OTHER PEOPLE: SOMEWHAT DIFFICULT
SUM OF ALL RESPONSES TO PHQ QUESTIONS 1-9: 4
SUM OF ALL RESPONSES TO PHQ QUESTIONS 1-9: 4

## 2024-06-03 NOTE — PROGRESS NOTES
Transition Clinic Progress Note    Patient Name:   Jose Lizama Date: Miladis 3, 2024          Service Type: Individual        VISIT TIME START: 0830am      VISIT TIME END: 0915am      Session Length:  TC Session Length: 45 (38-52 Minutes)    Session #:  10+    Attendees: TC Attendees: Client alone    Service Modality:  Service Modality: Video Visit:      Provider verified identity through the following two step process.  Patient provided:  Patient  and Patient address    Telemedicine Visit: The patient's condition can be safely assessed and treated via synchronous audio and visual telemedicine encounter.      Reason for Telemedicine Visit: Patient convenience (e.g. access to timely appointments / distance to available provider)    Originating Site (Patient Location): Patient's home    Distant Site (Provider Location): [facility]    Consent:  The patient/guardian has verbally consented to: the potential risks and benefits of telemedicine (video visit) versus in person care; bill my insurance or make self-payment for services provided; and responsibility for payment of non-covered services.     Patient would like the video invitation sent by:  My Chart    Mode of Communication:  Video Conference via [facility]    Distant Location (Provider):  Off-site    As the provider I attest to compliance with applicable laws and regulations related to telemedicine.    Informed Consent and Assessment Methods    This provider and patient discussed HIPAA, and the limits of confidentiality; including mandated reporting, the possibility of collaborative discussions with patient's primary care provider and the multidisciplinary team in the MH clinic during consultation.  Discussed the no show policy, and the benefits and possible unintended consequences of therapy.  Patient indicated understanding Transition Clinic services are short term, solution focused, until a referral can  be made and patient can bridge to long term therapy.  Patient verbally indicated understanding the informed consent.        DATA  Interactive Complexity: No  Crisis: No        Progress Since Last Session (Related to Symptoms / Goals / Homework):   Symptoms: Improving pt reports improved sxs, GAD7 and PHQ9 screenrs appear congruent improving since February 2024, around initial session.  Homework: Completed in session      Episode of Care Goals: Satisfactory progress - ACTION (Actively working towards change); Intervened by reinforcing change plan / affirming steps taken     Current / Ongoing Stressors and Concerns:   Pt reports improved sxs this session, screeners appear minimal.  Pt denies current SI, HI, AH/VH at this point in time.  CSSR completed. Pt reports adequate supports, medication compliance.  Pt reports she is hopeful and future-oriented, looking forward to spending time with her friend .  Pt reports the following protective factors: supportive family, willingness to engage in therapy, hope for the future, attached to family/friends, embedded in a protective network, demonstrates reliability to personal/professional life.      Pt processed regarding a cat that she is caring for and may keep, reports some nervousness around it.  Pt talks about how well her relationships are going currently.  Pt reports improved medical condition, decreasing stressor.  Pt talks about using LOCUS of control skills with cat and what she can do within her power.  Writer actively listened, praised, provided empathy and validation.  Pt and writer reviewed mindfulness, LOCUS and challenging negative thoughts.  Pt appeared engaged and receptive to the above interventions.      Plan:  6/17/24 @ 8:30am.        Treatment Objective(s) Addressed in This Session:   use distraction each time intrusive worry surfaces  Encourage challenging negative thoughts  Encourage self-compassion and self-care       Intervention:      Brief  Psychotherapy - discussed and prioritizing the most efficient treatment., Cognitive Behavioral Therapy (CBT) - Discussed changing thoughts is the path to changing behaviors and feelings., Motivational Interviewing - helping to find the motivation to make positive behavior changes., and Person-Centered Therapy - Actualizes potential and moves towards increased awareness, spontaneity, trust in self and inner directedness.    Assessments completed prior to visit:  The following assessments were completed by patient for this visit:  PHQ9:       2/5/2024    12:00 PM 2/12/2024    10:06 AM 2/12/2024    10:15 AM 3/18/2024     8:22 AM 4/22/2024     7:24 AM 5/20/2024     8:24 AM 6/3/2024     8:22 AM   PHQ-9 SCORE   PHQ-9 Total Score MyChart 11 (Moderate depression)  7 (Mild depression) 6 (Mild depression) 7 (Mild depression) 5 (Mild depression) 4 (Minimal depression)   PHQ-9 Total Score 11 7 7 6 7 5 4     GAD7:       4/16/2014     2:56 PM 2/5/2024    12:01 PM 2/12/2024    10:06 AM 3/11/2024     2:29 PM 5/20/2024     8:25 AM 6/3/2024     8:22 AM   YAZMIN-7 SCORE   Total Score 11        Total Score  8 (mild anxiety)  6 (mild anxiety) 7 (mild anxiety) 4 (minimal anxiety)   Total Score  8 8 6 7 4   [place] Suicide Severity Rating Scale (Short Version)      4/1/2024     9:00 AM 4/8/2024     4:00 PM 4/15/2024     9:00 AM 4/22/2024     8:00 AM 5/6/2024     1:00 PM 5/20/2024     9:00 AM 6/3/2024     9:00 AM   [place] Suicide Severity Rating (Short Version)   1. Wish to be Dead (Since Last Contact) N N N N N N N   2. Non-Specific Active Suicidal Thoughts (Since Last Contact) N N N N N N N   Actual Attempt (Since Last Contact) N N N N N N N   Has subject engaged in non-suicidal self-injurious behavior? (Since Last Contact) N N N N N N N   Interrupted Attempts (Since Last Contact) N N N N N N N   Aborted or Self-Interrupted Attempt (Since Last Contact) N N N N N N N   Preparatory Acts or Behavior (Since Last Contact) N N N N N N N    Suicide (Since Last Contact) N N N N N N N   Calculated C-SSRS Risk Score (Since Last Contact) No Risk Indicated No Risk Indicated No Risk Indicated No Risk Indicated No Risk Indicated No Risk Indicated No Risk Indicated           ASSESSMENT: Current Emotional / Mental Status (status of significant symptoms):   Risk status (Self / Other harm or suicidal ideation)   Patient denies current fears or concerns for personal safety.   Patient denies current or recent suicidal ideation or behaviors.   Patient denies current or recent homicidal ideation or behaviors.   Patient denies current or recent self injurious behavior or ideation.   Patient denies other safety concerns.   Patient reports there has been no change in risk factors since their last session.     Patient reports there has been no change in protective factors since their last session.     Recommended that patient call 911 or go to the local ED should there be a change in any of these risk factors.     Appearance:   Appropriate    Eye Contact:   Good    Psychomotor Behavior: Normal    Attitude:   Cooperative    Orientation:   All   Speech    Rate / Production: Normal/ Responsive Normal     Volume:  Normal    Mood:    Normal   Affect:    Appropriate    Thought Content:  Clear    Thought Form:  Coherent  Logical    Insight:    Good      Medication Review:   No changes to current psychiatric medication(s)     Medication Compliance:   Yes     Changes in Health Issues:   None reported     Chemical Use Review:   Substance Use: Chemical use reviewed, no active concerns identified      Tobacco Use: No current tobacco use.      Diagnoses:   300.02 (F41.1) Generalized Anxiety Disorder    Collateral Reports Completed:    Collateral: [facility] electronic medical records reviewed.    PLAN: (Patient Tasks / Therapist Tasks / Other)  Plan: 6/17/24 @ 830am    Is this the patient's last discharge?: No    Procedure Code: Psychotherapy (with patient) - 45 [CPT  47216]    MARIN FROST, LGSW                                                         ______________________________________________________________________   Safety plan reviewed with pt, pt agrees to follow, safety plan made available to pt, safety plan at the bottom of this note, and in pts active mychart.   ____________________________________________________________________________________________________________________________  Treatment Plan     Patient's Name: Jose Lizama                YOB: 1990  Date of Creation: 2/26/24  Date Treatment Plan Last Reviewed/Revised: Initial     DSM5 Diagnoses: 300.02 (F41.1) Generalized Anxiety Disorder  Psychosocial / Contextual Factors: Pt with reported situational stressors.  PROMIS (reviewed every 90 days):      Referral / Collaboration:  N/A .     Anticipated number of session for this episode of care: 6-9 sessions  Anticipation frequency of session: Weekly  Anticipated Duration of each session: 38-52 minutes  Treatment plan will be reviewed in 90 days or when goals have been changed.         MeasurableTreatment Goal(s) related to diagnosis / functional impairment(s)  Goal 1: Patient will reduce overall frequency, intensity, and duration of the anxiety so that daily functioning is not impaired.       Objective #A                      Patient will describe situations, thoughts, feelings, and actions associated with anxieties and worries, their impact on functioning, and attempts to resolve them.      Intervention(s)      LMHP will focus on developing a level of trust with the patient; provide support and empathy to encourage the client to feel safe in expressing their symptoms.       LMHP will ask the patient to describe their past experiences of anxiety and their impact on functioning: assess the focus, excessiveness, and uncontrollability of the worry and the type, frequency, intensity, and duration of their anxiety symptoms.       Goal 2: Patient  "will learn and implement coping skills that result in a reduction of anxiety and worry and improved daily functioning.       Objective A                        Patient will learn and implement calming skills to reduce overall anxiety and manage anxiety symptoms      Objective B      Patient will verbalize understanding of the role that cognitive biases play in excessive irrational worry and persistent anxiety symptoms.       Intervention(s)      LMHP will teach the patient calming/relaxation skills and how to differentiate between relaxation and tension while teaching the patient how to implement these strategies in their daily life.       LMHP will assist the patient in analyzing their worries by examining cognitive distortions and the patient s ability to control their emotions surrounding cognitive distortions..        Patient has reviewed and agreed to the above plan.        MARIN HUNTING                February 25, 2024     ___________________________________________________________________________________________________________________________  Patient has no change in safety concerns. Committed to safety and agreed to follow previously developed safety plan.  Reviewed safety plan with pt, pt agreed to follow, safety plan made available to pt, safety plan at the bottom of this note and in pts active mychart.     Safety Plan       If I am feeling unsafe or I am in a crisis, I will:   -Contact my established care providers   -Call the National Suicide Prevention Lifeline: [phone]   -Go to the nearest emergency room   -Call [phone]      Warning signs that I or other people might notice when a crisis is developing for me:  increased \"big feelings\"  -Decreased appetite,    -Decreased sleep   -Restlessness   -Increased thoughts about wanting to die   -Increased irritability or agitation.       Things I am able to do on my own to cope or help me feel better:    -take a time-out. Practice yoga, listen to music, " meditate, get a massage, or learn relaxation techniques. Stepping back from the problem helps clear your head.   -Eat well-balanced meals. Do not skip any meals. Do keep healthful, energy-boosting snacks on hand.   -Limit alcohol and caffeine   -Get enough sleep. When stressed, your body needs additional sleep and rest.    -Exercise daily to help you feel good and maintain your health.   -Accept that you cannot control everything. Put your stress in perspective: Is it really as bad as you think?    -Welcome humor. A good laugh goes a long way.      Things that I am able to do with others to cope or help me better:    -Listen and talk about concerns   -Continue to follow with outpatient care providers and case management    -Go for a walk   -Distract with going out to eat, to a movie or a park.       Things I can use or do for distraction:    -Watch a TV show. If you don't have cable or a subscription service, many television networks offer free access, without a log-in, until you get closer to the most recent episodes.   -Watch a movie. Light-hearted comedy, drama to suck you in, or an old favorite - there are countless films to whisk you away for a bit.   -Sing. It doesn't matter if you're a professional vocalist or can't to carry a tune, singing engages a completely different part of your brain. Plus, the vibrations in your chest give great sensory feedback and the vocalization reminds you of your voice.   -Watch cute videos on [employer]. About as low-effort as it gets: puppy/kassy videos, laugh challenges, or Vine compilations - take your pick.  -Mindless doodles/finger painting/playing with marcial. This may be especially helpful to those with child parts (DID/OSDD) who need an activity of their own.   -Grab a snack.   -Drum on a surface. Like singing, the vibrations and bilateral stimulation of your hands thumping will engage different parts of your mind and bring your attention away from what's intruding on you.    -Play a game or use a fun evan on your phone. Even if you aren't a , search the evan store. You might find one that speaks to you. It can be a great escape to get lost in for a bit.   -Video games. Any console, any game!   -Tear out words/photos/etc for a collage. Ask a local doctor's office or hairdresser for their spare magazines. Mindlessly rip out photos and words that speak to you. (Bonus: you may get to put tabloids to good use for once! They often have the scathing, overdramatic words that happen to be great for a therapeutic collages. Shocking! Betrayal! You Won't Believe It!).   -Discover new music. [employer], Trendslide, -Carlock, so many ways to find new gems!   -Wash your face/hands or brush your teeth. A quick refresher can help you restart your day on a brand new page.   -Re-watch highlights from your favorite sport. It's easy to forget just how many epic, captivating moments there were once some time has passed. Relive your excitement. Plus, you already know how it ends, so you don't have to pay super close attention!   -Gratitude list. When your mind only wants to remind you of distressing things, focusing on 10+ things you're grateful for can really take you to a whole new atmosphere in your mind and heart.  -Imagery exercises. Containment exercises, healing pool/healing light, guided meditation, so many options!   -Play a board game with a friend. Something simple like Sorry!, challenging like chess, or silly like Cards Against Humanity, there are lots of options to distract you in the company of friends.   -Card games. Solo works, too, if there's no one around.      Changes I can make to support my mental health and wellness:      1. Value yourself: Treat yourself with kindness and respect, and avoid self-criticism. Make time for your hobbies and favorite projects, or broaden your horizons. Do a daily crossword puzzle, plant a garden, take dance lessons, learn to play an instrument or become  fluent in another language.      2. Take care of your body: Taking care of yourself physically can improve your mental health. Be sure to:   -Eat nutritious meals   -Avoid smoking and vaping-  -Drink plenty of water   -Exercise, which helps decrease depression and anxiety and improve moods   -Get enough sleep. Researchers believe that lack of sleep contributes to a high rate of depression in college students.       3. Surround yourself with good people: People with strong family or social connections are generally healthier than those who lack a support network. Make plans with supportive family members and friends, or seek out activities where you can meet new people, such as a club, class or support group.      4. Give yourself: Volunteer your time and energy to help someone else. You'll feel good about doing something tangible to help someone in need -- and it's a great way to meet new people. See Fun and Cheap Things to do in [place] for ideas.      5. Learn how to deal with stress: Like it or not, stress is a part of life. Practice good coping skills: Try One-Minute Stress Strategies, do Rehan Chi, exercise, take a nature walk, play with your pet or try journal writing as a stress reducer. Also, remember to smile and see the humor in life. Research shows that laughter can boost your immune system, ease pain, relax your body and reduce stress.      6. Quiet your mind: Try meditating, Mindfulness and/or prayer. Relaxation exercises and prayer can improve your state of mind and outlook on life. In fact, research shows that meditation may help you feel calm and enhance the effects of therapy. To get connected, see spiritual resources on Personal Well-being for Students      7. Set realistic goals: Decide what you want to achieve academically, professionally and personally, and write down the steps you need to realize your goals. Aim high, but be realistic and don't over-schedule. You'll enjoy a tremendous sense of  "accomplishment and self-worth as you progress toward your goal. Wellness Coaching, free to [employer] students, can help you develop goals and stay on track.       8. Break up the monotony: Although our routines make us more efficient and enhance our feelings of security and safety, a little change of pace can perk up a tedious schedule. Alter your jogging route, plan a road-trip, take a walk in a different park, hang some new pictures or try a new restaurant.      9. Avoid alcohol and other drugs: Keep alcohol use to a minimum and avoid other drugs. Sometimes people use alcohol and other drugs to \"self-medicate\" but in reality, alcohol and other drugs only aggravate problems.      10. Get help when you need it: Seeking help is a sign of strength -- not a weakness. And it is important to remember that treatment is effective. People who get appropriate care can recover from mental illness and addiction and lead full, rewarding lives.      People in my life that I can ask for help: parents, friends  -Spouse   -Friends   -Therapist    -Other Mental health Supportive Services   "

## 2024-06-16 NOTE — PROGRESS NOTES
Transition Clinic Progress Note    Patient Name:   Jose Lizama Date: 2024          Service Type: Individual        VISIT TIME START: 830      VISIT TIME END: 910      Session Length:  TC Session Length: 45 (38-52 Minutes)    Session #:  10+    Attendees: TC Attendees: Client alone    Service Modality:  Service Modality: Video Visit:      Provider verified identity through the following two step process.  Patient provided:  Patient  and Patient address    Telemedicine Visit: The patient's condition can be safely assessed and treated via synchronous audio and visual telemedicine encounter.      Reason for Telemedicine Visit: Patient convenience (e.g. access to timely appointments / distance to available provider)    Originating Site (Patient Location): Patient's home    Distant Site (Provider Location): [facility]    Consent:  The patient/guardian has verbally consented to: the potential risks and benefits of telemedicine (video visit) versus in person care; bill my insurance or make self-payment for services provided; and responsibility for payment of non-covered services.     Patient would like the video invitation sent by:  My Chart    Mode of Communication:  Video Conference via [facility]    Distant Location (Provider):  Off-site    As the provider I attest to compliance with applicable laws and regulations related to telemedicine.    Informed Consent and Assessment Methods    This provider and patient discussed HIPAA, and the limits of confidentiality; including mandated reporting, the possibility of collaborative discussions with patient's primary care provider and the multidisciplinary team in the MH clinic during consultation.  Discussed the no show policy, and the benefits and possible unintended consequences of therapy.  Patient indicated understanding Transition Clinic services are short term, solution focused, until a referral can be  made and patient can bridge to long term therapy.  Patient verbally indicated understanding the informed consent.        DATA  Interactive Complexity: No  Crisis: No        Progress Since Last Session (Related to Symptoms / Goals / Homework):   Symptoms: Improving    Homework: Completed in session      Episode of Care Goals: Satisfactory progress - ACTION (Actively working towards change); Intervened by reinforcing change plan / affirming steps taken     Current / Ongoing Stressors and Concerns:   Pt reports she did end up re-homing her foster cat last Friday, pt talks about feelings.  Pt reports she feels good about her decision and is just hoping for re-homing soon.  Pt denies excessive sxs, PHQ9 and GAD7 screeners appear congruent with 3/4  mild indicators.  Pt denies current SI, HI, AH/VH, and/or jeovany sxs, at this point in time.  Pt reports medication compliance and adequate supports.  Pt reports she is hopeful and future-oriented, looking forward to weekend and cat getting rehomed  .  Pt reports the following protective factors: supportive family, willingness to engage in therapy, hope for the future, attached to family/friends, embedded in a protective network, demonstrates reliability to personal/professional life.     Pt largely processed today regarding feelings and foster cat, work, family, and relationship.  Writer actively listened, provided empathy and validation.  Pt and writer explored coping skills including use of the 10 minute pause, DBT and responding from emotional mind and optimal, challenging negative thoughts, and self-care.  Pt also talked about next session and transition to next [facility] 7/15/24.  Pt appeared engaged and receptive to the above interventions.    Plan:  7/1/24  @ 8:30am     Treatment Objective(s) Addressed in This Session:   Encourage identify three distraction and diversion activities and use those activities to decrease level of anxiety    Encourage challenging negative  thoughts and replacing with alternative  Encourage self-care     Intervention:      Brief Psychotherapy - discussed and prioritizing the most efficient treatment., Cognitive Behavioral Therapy (CBT) - Discussed changing thoughts is the path to changing behaviors and feelings., Mindfulness Therapy - Cultivation of present-oriented, non-judgmental attitude. It helps nurtures great awareness, clarity, and acceptance of reality., Motivational Interviewing - helping to find the motivation to make positive behavior changes., and Person-Centered Therapy - Actualizes potential and moves towards increased awareness, spontaneity, trust in self and inner directedness.    Assessments completed prior to visit:  The following assessments were completed by patient for this visit:  PHQ9:       2/12/2024    10:06 AM 2/12/2024    10:15 AM 3/18/2024     8:22 AM 4/22/2024     7:24 AM 5/20/2024     8:24 AM 6/3/2024     8:22 AM 6/17/2024     8:26 AM   PHQ-9 SCORE   PHQ-9 Total Score MyChart  7 (Mild depression) 6 (Mild depression) 7 (Mild depression) 5 (Mild depression) 4 (Minimal depression) 3 (Minimal depression)   PHQ-9 Total Score 7 7 6 7 5 4 3     GAD7:       4/16/2014     2:56 PM 2/5/2024    12:01 PM 2/12/2024    10:06 AM 3/11/2024     2:29 PM 5/20/2024     8:25 AM 6/3/2024     8:22 AM 6/17/2024     8:26 AM   YAZMIN-7 SCORE   Total Score 11         Total Score  8 (mild anxiety)  6 (mild anxiety) 7 (mild anxiety) 4 (minimal anxiety) 4 (minimal anxiety)   Total Score  8 8 6 7 4 4     PROMIS 10-Global Health (only subscores and total score):       2/5/2024    12:17 PM 5/6/2024    12:24 PM   PROMIS-10 Scores Only   Global Mental Health Score 12 13   Global Physical Health Score 14 17   PROMIS TOTAL - SUBSCORES 26 30     [place] Suicide Severity Rating Scale (Short Version)      4/8/2024     4:00 PM 4/15/2024     9:00 AM 4/22/2024     8:00 AM 5/6/2024     1:00 PM 5/20/2024     9:00 AM 6/3/2024     9:00 AM 6/17/2024     9:00 AM   [place]  Suicide Severity Rating (Short Version)   1. Wish to be Dead (Since Last Contact) N N N N N N N   2. Non-Specific Active Suicidal Thoughts (Since Last Contact) N N N N N N N   Actual Attempt (Since Last Contact) N N N N N N N   Has subject engaged in non-suicidal self-injurious behavior? (Since Last Contact) N N N N N N N   Interrupted Attempts (Since Last Contact) N N N N N N N   Aborted or Self-Interrupted Attempt (Since Last Contact) N N N N N N N   Preparatory Acts or Behavior (Since Last Contact) N N N N N N N   Suicide (Since Last Contact) N N N N N N N   Calculated C-SSRS Risk Score (Since Last Contact) No Risk Indicated No Risk Indicated No Risk Indicated No Risk Indicated No Risk Indicated No Risk Indicated No Risk Indicated           ASSESSMENT: Current Emotional / Mental Status (status of significant symptoms):   Risk status (Self / Other harm or suicidal ideation)   Patient denies current fears or concerns for personal safety.   Patient denies current or recent suicidal ideation or behaviors.   Patient denies current or recent homicidal ideation or behaviors.   Patient denies current or recent self injurious behavior or ideation.   Patient denies other safety concerns.   Patient reports there has been no change in risk factors since their last session.     Patient reports there has been no change in protective factors since their last session.     Recommended that patient call 911 or go to the local ED should there be a change in any of these risk factors.     Appearance:   Appropriate    Eye Contact:   Good    Psychomotor Behavior: Normal    Attitude:   Cooperative    Orientation:   All   Speech    Rate / Production: Normal     Volume:  Normal    Mood:    Normal   Affect:    Appropriate    Thought Content:  Clear    Thought Form:  Coherent  Logical    Insight:    Good      Medication Review:   No changes to current psychiatric medication(s)     Medication Compliance:   Yes     Changes in Health  Issues:   None reported     Chemical Use Review:   Substance Use: Chemical use reviewed, no active concerns identified      Tobacco Use: No current tobacco use.      Diagnoses:   300.02 (F41.1) Generalized Anxiety Disorder    Collateral Reports Completed:   TC Collateral: [facility] electronic [facility] records reviewed.    PLAN: (Patient Tasks / Therapist Tasks / Other)  Plan for discontinue to next LOC:  [facility] session 7/1/24.  [facility] appt 7/15/24    Is this the patient's last discharge?: No    Procedure Code: Psychotherapy (with patient) - 45 [CPT 13766]    JOSE MIGUEL WARREN        Safety plan reviewed with pt, pt agrees to follow, safety plan made available to pt, safety plan at the bottom of this note, and in pts active mychart.   ____________________________________________________________________________________________________________________________  Treatment Plan     Patient's Name: Jose Lizama                YOB: 1990  Date of Creation: 2/26/24  Date Treatment Plan Last Reviewed/Revised: Initial     DSM5 Diagnoses: 300.02 (F41.1) Generalized Anxiety Disorder  Psychosocial / Contextual Factors: Pt with reported situational stressors.  PROMIS (reviewed every 90 days):      Referral / Collaboration:  N/A .     Anticipated number of session for this episode of care: 6-9 sessions  Anticipation frequency of session: Weekly  Anticipated Duration of each session: 38-52 minutes  Treatment plan will be reviewed in 90 days or when goals have been changed.         MeasurableTreatment Goal(s) related to diagnosis / functional impairment(s)  Goal 1: Patient will reduce overall frequency, intensity, and duration of the anxiety so that daily functioning is not impaired.       Objective #A                      Patient will describe situations, thoughts, feelings, and actions associated with anxieties and worries, their impact on functioning, and attempts to resolve them.      Intervention(s)       LMHP will focus on developing a level of trust with the patient; provide support and empathy to encourage the client to feel safe in expressing their symptoms.       LMHP will ask the patient to describe their past experiences of anxiety and their impact on functioning: assess the focus, excessiveness, and uncontrollability of the worry and the type, frequency, intensity, and duration of their anxiety symptoms.       Goal 2: Patient will learn and implement coping skills that result in a reduction of anxiety and worry and improved daily functioning.       Objective A                        Patient will learn and implement calming skills to reduce overall anxiety and manage anxiety symptoms      Objective B      Patient will verbalize understanding of the role that cognitive biases play in excessive irrational worry and persistent anxiety symptoms.       Intervention(s)      LMHP will teach the patient calming/relaxation skills and how to differentiate between relaxation and tension while teaching the patient how to implement these strategies in their daily life.       LMHP will assist the patient in analyzing their worries by examining cognitive distortions and the patient s ability to control their emotions surrounding cognitive distortions..        Patient has reviewed and agreed to the above plan.        MARIN FROST                February 25, 2024     ___________________________________________________________________________________________________________________________  Patient has no change in safety concerns. Committed to safety and agreed to follow previously developed safety plan.  Reviewed safety plan with pt, pt agreed to follow, safety plan made available to pt, safety plan at the bottom of this note and in pts active mychart.     Safety Plan       If I am feeling unsafe or I am in a crisis, I will:   -Contact my established care providers   -Call the National Suicide Prevention Lifeline:  "[phone]   -Go to the nearest emergency room   -Call 911      Warning signs that I or other people might notice when a crisis is developing for me:  increased \"big feelings\"  -Decreased appetite,    -Decreased sleep   -Restlessness   -Increased thoughts about wanting to die   -Increased irritability or agitation.       Things I am able to do on my own to cope or help me feel better:    -take a time-out. Practice yoga, listen to music, meditate, get a massage, or learn relaxation techniques. Stepping back from the problem helps clear your head.   -Eat well-balanced meals. Do not skip any meals. Do keep healthful, energy-boosting snacks on hand.   -Limit alcohol and caffeine   -Get enough sleep. When stressed, your body needs additional sleep and rest.    -Exercise daily to help you feel good and maintain your health.   -Accept that you cannot control everything. Put your stress in perspective: Is it really as bad as you think?    -Welcome humor. A good laugh goes a long way.      Things that I am able to do with others to cope or help me better:    -Listen and talk about concerns   -Continue to follow with outpatient care providers and case management    -Go for a walk   -Distract with going out to eat, to a movie or a park.       Things I can use or do for distraction:    -Watch a TV show. If you don't have cable or a subscription service, many television networks offer free access, without a log-in, until you get closer to the most recent episodes.   -Watch a movie. Light-hearted comedy, drama to suck you in, or an old favorite - there are countless films to whisk you away for a bit.   -Sing. It doesn't matter if you're a professional vocalist or can't to carry a tune, singing engages a completely different part of your brain. Plus, the vibrations in your chest give great sensory feedback and the vocalization reminds you of your voice.   -Watch cute videos on [employer]. About as low-effort as it gets: puppy/kassy " videos, laugh challenges, or Vine compilations - take your pick.  -Mindless doodles/finger painting/playing with marcial. This may be especially helpful to those with child parts (DID/OSDD) who need an activity of their own.   -Grab a snack.   -Drum on a surface. Like singing, the vibrations and bilateral stimulation of your hands thumping will engage different parts of your mind and bring your attention away from what's intruding on you.   -Play a game or use a fun evan on your phone. Even if you aren't a , search the evan store. You might find one that speaks to you. It can be a great escape to get lost in for a bit.   -Video games. Any console, any game!   -Tear out words/photos/etc for a collage. Ask a local doctor's office or hairdresser for their spare magazines. Mindlessly rip out photos and words that speak to you. (Bonus: you may get to put tabloids to good use for once! They often have the scathing, overdramatic words that happen to be great for a therapeutic collages. Shocking! Betrayal! You Won't Believe It!).   -Discover new music. [employer], [employer], -[place], so many ways to find new gems!   -Wash your face/hands or brush your teeth. A quick refresher can help you restart your day on a brand new page.   -Re-watch highlights from your favorite sport. It's easy to forget just how many epic, captivating moments there were once some time has passed. Relive your excitement. Plus, you already know how it ends, so you don't have to pay super close attention!   -Gratitude list. When your mind only wants to remind you of distressing things, focusing on 10+ things you're grateful for can really take you to a whole new atmosphere in your mind and heart.  -Imagery exercises. Containment exercises, healing pool/healing light, guided meditation, so many options!   -Play a board game with a friend. Something simple like Sorry!, challenging like chess, or silly like Cards Against Humanity, there are lots of options to  distract you in the company of friends.   -Card games. Solo works, too, if there's no one around.      Changes I can make to support my mental health and wellness:      1. Value yourself: Treat yourself with kindness and respect, and avoid self-criticism. Make time for your hobbies and favorite projects, or broaden your horizons. Do a daily crossword puzzle, plant a garden, take dance lessons, learn to play an instrument or become fluent in another language.      2. Take care of your body: Taking care of yourself physically can improve your mental health. Be sure to:   -Eat nutritious meals   -Avoid smoking and vaping-  -Drink plenty of water   -Exercise, which helps decrease depression and anxiety and improve moods   -Get enough sleep. Researchers believe that lack of sleep contributes to a high rate of depression in college students.       3. Surround yourself with good people: People with strong family or social connections are generally healthier than those who lack a support network. Make plans with supportive family members and friends, or seek out activities where you can meet new people, such as a club, class or support group.      4. Give yourself: Volunteer your time and energy to help someone else. You'll feel good about doing something tangible to help someone in need -- and it's a great way to meet new people. See Fun and Cheap Things to do in [place] for ideas.      5. Learn how to deal with stress: Like it or not, stress is a part of life. Practice good coping skills: Try One-Minute Stress Strategies, do Rehan Chi, exercise, take a nature walk, play with your pet or try journal writing as a stress reducer. Also, remember to smile and see the humor in life. Research shows that laughter can boost your immune system, ease pain, relax your body and reduce stress.      6. Quiet your mind: Try meditating, Mindfulness and/or prayer. Relaxation exercises and prayer can improve your state of mind and outlook  "on life. In fact, research shows that meditation may help you feel calm and enhance the effects of therapy. To get connected, see spiritual resources on Personal Well-being for Students      7. Set realistic goals: Decide what you want to achieve academically, professionally and personally, and write down the steps you need to realize your goals. Aim high, but be realistic and don't over-schedule. You'll enjoy a tremendous sense of accomplishment and self-worth as you progress toward your goal. Wellness Coaching, free to [employer] students, can help you develop goals and stay on track.       8. Break up the monotony: Although our routines make us more efficient and enhance our feelings of security and safety, a little change of pace can perk up a tedious schedule. Alter your jogging route, plan a road-trip, take a walk in a different park, hang some new pictures or try a new restaurant.      9. Avoid alcohol and other drugs: Keep alcohol use to a minimum and avoid other drugs. Sometimes people use alcohol and other drugs to \"self-medicate\" but in reality, alcohol and other drugs only aggravate problems.      10. Get help when you need it: Seeking help is a sign of strength -- not a weakness. And it is important to remember that treatment is effective. People who get appropriate care can recover from mental illness and addiction and lead full, rewarding lives.      People in my life that I can ask for help: parents, friends  -Spouse   -Friends   -Therapist    -Other Mental health Supportive Services   "

## 2024-06-17 ENCOUNTER — VIRTUAL VISIT (OUTPATIENT)
Dept: BEHAVIORAL HEALTH | Facility: CLINIC | Age: 34
End: 2024-06-17
Payer: COMMERCIAL

## 2024-06-17 DIAGNOSIS — F41.1 GENERALIZED ANXIETY DISORDER: Primary | ICD-10-CM

## 2024-06-17 PROCEDURE — 99207 PR NO BILLABLE SERVICE THIS VISIT: CPT | Mod: 25

## 2024-06-17 ASSESSMENT — ANXIETY QUESTIONNAIRES
1. FEELING NERVOUS, ANXIOUS, OR ON EDGE: SEVERAL DAYS
3. WORRYING TOO MUCH ABOUT DIFFERENT THINGS: SEVERAL DAYS
7. FEELING AFRAID AS IF SOMETHING AWFUL MIGHT HAPPEN: NOT AT ALL
2. NOT BEING ABLE TO STOP OR CONTROL WORRYING: SEVERAL DAYS
6. BECOMING EASILY ANNOYED OR IRRITABLE: SEVERAL DAYS
GAD7 TOTAL SCORE: 4
GAD7 TOTAL SCORE: 4
4. TROUBLE RELAXING: NOT AT ALL
8. IF YOU CHECKED OFF ANY PROBLEMS, HOW DIFFICULT HAVE THESE MADE IT FOR YOU TO DO YOUR WORK, TAKE CARE OF THINGS AT HOME, OR GET ALONG WITH OTHER PEOPLE?: SOMEWHAT DIFFICULT
GAD7 TOTAL SCORE: 4
7. FEELING AFRAID AS IF SOMETHING AWFUL MIGHT HAPPEN: NOT AT ALL
5. BEING SO RESTLESS THAT IT IS HARD TO SIT STILL: NOT AT ALL
IF YOU CHECKED OFF ANY PROBLEMS ON THIS QUESTIONNAIRE, HOW DIFFICULT HAVE THESE PROBLEMS MADE IT FOR YOU TO DO YOUR WORK, TAKE CARE OF THINGS AT HOME, OR GET ALONG WITH OTHER PEOPLE: SOMEWHAT DIFFICULT

## 2024-06-17 ASSESSMENT — PATIENT HEALTH QUESTIONNAIRE - PHQ9
10. IF YOU CHECKED OFF ANY PROBLEMS, HOW DIFFICULT HAVE THESE PROBLEMS MADE IT FOR YOU TO DO YOUR WORK, TAKE CARE OF THINGS AT HOME, OR GET ALONG WITH OTHER PEOPLE: SOMEWHAT DIFFICULT
SUM OF ALL RESPONSES TO PHQ QUESTIONS 1-9: 3
SUM OF ALL RESPONSES TO PHQ QUESTIONS 1-9: 3

## 2024-06-17 ASSESSMENT — COLUMBIA-SUICIDE SEVERITY RATING SCALE - C-SSRS
2. HAVE YOU ACTUALLY HAD ANY THOUGHTS OF KILLING YOURSELF?: NO
TOTAL  NUMBER OF ABORTED OR SELF INTERRUPTED ATTEMPTS SINCE LAST CONTACT: NO
1. SINCE LAST CONTACT, HAVE YOU WISHED YOU WERE DEAD OR WISHED YOU COULD GO TO SLEEP AND NOT WAKE UP?: NO
6. HAVE YOU EVER DONE ANYTHING, STARTED TO DO ANYTHING, OR PREPARED TO DO ANYTHING TO END YOUR LIFE?: NO
TOTAL  NUMBER OF INTERRUPTED ATTEMPTS SINCE LAST CONTACT: NO
ATTEMPT SINCE LAST CONTACT: NO
SUICIDE, SINCE LAST CONTACT: NO

## 2024-07-01 ENCOUNTER — VIRTUAL VISIT (OUTPATIENT)
Dept: BEHAVIORAL HEALTH | Facility: CLINIC | Age: 34
End: 2024-07-01
Payer: COMMERCIAL

## 2024-07-01 ENCOUNTER — THERAPY VISIT (OUTPATIENT)
Dept: PHYSICAL THERAPY | Facility: CLINIC | Age: 34
End: 2024-07-01
Payer: COMMERCIAL

## 2024-07-01 DIAGNOSIS — M79.18 MYALGIA OF PELVIC FLOOR: Primary | ICD-10-CM

## 2024-07-01 DIAGNOSIS — F41.1 GENERALIZED ANXIETY DISORDER: Primary | ICD-10-CM

## 2024-07-01 PROCEDURE — 97530 THERAPEUTIC ACTIVITIES: CPT | Mod: GP | Performed by: PHYSICAL THERAPIST

## 2024-07-01 PROCEDURE — 99207 PR NO BILLABLE SERVICE THIS VISIT: CPT | Mod: 25

## 2024-07-01 ASSESSMENT — COLUMBIA-SUICIDE SEVERITY RATING SCALE - C-SSRS
TOTAL  NUMBER OF INTERRUPTED ATTEMPTS SINCE LAST CONTACT: NO
SUICIDE, SINCE LAST CONTACT: NO
6. HAVE YOU EVER DONE ANYTHING, STARTED TO DO ANYTHING, OR PREPARED TO DO ANYTHING TO END YOUR LIFE?: NO
2. HAVE YOU ACTUALLY HAD ANY THOUGHTS OF KILLING YOURSELF?: NO
TOTAL  NUMBER OF ABORTED OR SELF INTERRUPTED ATTEMPTS SINCE LAST CONTACT: NO
1. SINCE LAST CONTACT, HAVE YOU WISHED YOU WERE DEAD OR WISHED YOU COULD GO TO SLEEP AND NOT WAKE UP?: NO
ATTEMPT SINCE LAST CONTACT: NO

## 2024-07-01 ASSESSMENT — ANXIETY QUESTIONNAIRES
4. TROUBLE RELAXING: SEVERAL DAYS
7. FEELING AFRAID AS IF SOMETHING AWFUL MIGHT HAPPEN: SEVERAL DAYS
GAD7 TOTAL SCORE: 6
GAD7 TOTAL SCORE: 6
8. IF YOU CHECKED OFF ANY PROBLEMS, HOW DIFFICULT HAVE THESE MADE IT FOR YOU TO DO YOUR WORK, TAKE CARE OF THINGS AT HOME, OR GET ALONG WITH OTHER PEOPLE?: SOMEWHAT DIFFICULT
2. NOT BEING ABLE TO STOP OR CONTROL WORRYING: SEVERAL DAYS
IF YOU CHECKED OFF ANY PROBLEMS ON THIS QUESTIONNAIRE, HOW DIFFICULT HAVE THESE PROBLEMS MADE IT FOR YOU TO DO YOUR WORK, TAKE CARE OF THINGS AT HOME, OR GET ALONG WITH OTHER PEOPLE: SOMEWHAT DIFFICULT
3. WORRYING TOO MUCH ABOUT DIFFERENT THINGS: SEVERAL DAYS
7. FEELING AFRAID AS IF SOMETHING AWFUL MIGHT HAPPEN: SEVERAL DAYS
1. FEELING NERVOUS, ANXIOUS, OR ON EDGE: SEVERAL DAYS
GAD7 TOTAL SCORE: 6
6. BECOMING EASILY ANNOYED OR IRRITABLE: SEVERAL DAYS
5. BEING SO RESTLESS THAT IT IS HARD TO SIT STILL: NOT AT ALL

## 2024-07-01 NOTE — PROGRESS NOTES
Transition Clinic Progress Note    Patient Name:   Jose Lizama Date: 2024          Service Type: Individual        VISIT TIME START: 830      VISIT TIME END: 900      Session Length:  TC Session Length: 30 (16-37 Minutes)    Session #:  10+    Attendees: TC Attendees: Client alone    Service Modality:  Service Modality: Video Visit:      Provider verified identity through the following two step process.  Patient provided:  Patient  and Patient address    Telemedicine Visit: The patient's condition can be safely assessed and treated via synchronous audio and visual telemedicine encounter.      Reason for Telemedicine Visit: Patient convenience (e.g. access to timely appointments / distance to available provider)    Originating Site (Patient Location): Patient's home    Distant Site (Provider Location): [facility]    Consent:  The patient/guardian has verbally consented to: the potential risks and benefits of telemedicine (video visit) versus in person care; bill my insurance or make self-payment for services provided; and responsibility for payment of non-covered services.     Patient would like the video invitation sent by:  My Chart    Mode of Communication:  Video Conference via [facility]    Distant Location (Provider):  Off-site    As the provider I attest to compliance with applicable laws and regulations related to telemedicine.    Informed Consent and Assessment Methods    This provider and patient discussed HIPAA, and the limits of confidentiality; including mandated reporting, the possibility of collaborative discussions with patient's primary care provider and the multidisciplinary team in the MH clinic during consultation.  Discussed the no show policy, and the benefits and possible unintended consequences of therapy.  Patient indicated understanding Transition Clinic services are short term, solution focused, until a referral can be  made and patient can bridge to long term therapy.  Patient verbally indicated understanding the informed consent.        DATA  Interactive Complexity: No  Crisis: No        Progress Since Last Session (Related to Symptoms / Goals / Homework):   Symptoms: Improving    Homework: Completed in session      Episode of Care Goals: Satisfactory progress - ACTION (Actively working towards change); Intervened by reinforcing change plan / affirming steps taken     Current / Ongoing Stressors and Concerns:   Pt reports improved sxs today.  Pt denies excessive anxiety of depression sxs, PHQ and YAZMIN screeners appear congruent mild.  Pt denies current SI, plan intent, HI, AH/VH, and/or jeovany sxs, at this point in time. CSSR completed.  Pt reports she is hopeful looking forward to summer music festivals with friends and partner.  Pt reports medication compliance and adequate supports.  Pt reports she is hopeful and future-oriented, looking forward to spending time outside this summer at music festivals.  Pt reports the following protective factors: supportive family, willingness to engage in therapy, hope for the future, attached to family/friends, embedded in a protective network, demonstrates reliability to personal/professional life.     Pt processed today regarding therapy discharge/termination today with TC clinic as successfully transitioning to next LOC [facility].  Writer actively listened, provided empathy, and validation, praised.  Pt reports excitement and eagerness to continue the therapy process.  Pt and writer discussed therapy gains including focus on pride in new skills, LOCUS of control, compartmentalization, urge surfing, mindfulness.  Pt and writer discussed go to coping skills, social supports and discussed the work she would like to continue with next LOC, including family dynamics.  Pt expressed gratitude and thankfulness.  Pt motivated and insightful.  Pt appeared engaged and receptive to the above  interventions.    Discharge to [facility] next LOC.         Treatment Objective(s) Addressed in This Session:   identify three distraction and diversion activities and use those activities to decrease level of anxiety    Encourage LOCUS of control  Encourage challenging negative thoughts and replacing with alternative  Encourage self-care     Intervention:   Solution Focused Brief Therapy   Brief Psychotherapy - discussed and prioritizing the most efficient treatment., Cognitive Behavioral Therapy (CBT) - Discussed changing thoughts is the path to changing behaviors and feelings., Mindfulness Therapy - Cultivation of present-oriented, non-judgmental attitude. It helps nurtures great awareness, clarity, and acceptance of reality., Motivational Interviewing - helping to find the motivation to make positive behavior changes., and Person-Centered Therapy - Actualizes potential and moves towards increased awareness, spontaneity, trust in self and inner directedness.    Assessments completed prior to visit:  The following assessments were completed by patient for this visit:  PHQ9:       2/12/2024    10:15 AM 3/18/2024     8:22 AM 4/22/2024     7:24 AM 5/20/2024     8:24 AM 6/3/2024     8:22 AM 6/17/2024     8:26 AM 7/1/2024     8:27 AM   PHQ-9 SCORE   PHQ-9 Total Score MyChart 7 (Mild depression) 6 (Mild depression) 7 (Mild depression) 5 (Mild depression) 4 (Minimal depression) 3 (Minimal depression) 3 (Minimal depression)   PHQ-9 Total Score 7 6 7 5 4 3 3     GAD7:       2/5/2024    12:01 PM 2/12/2024    10:06 AM 3/11/2024     2:29 PM 5/20/2024     8:25 AM 6/3/2024     8:22 AM 6/17/2024     8:26 AM 7/1/2024     8:27 AM   YAZMIN-7 SCORE   Total Score 8 (mild anxiety)  6 (mild anxiety) 7 (mild anxiety) 4 (minimal anxiety) 4 (minimal anxiety) 6 (mild anxiety)   Total Score 8 8 6 7 4 4 6     PROMIS 10-Global Health (only subscores and total score):       2/5/2024    12:17 PM 5/6/2024    12:24 PM   PROMIS-10 Scores Only   Global  Mental Health Score 12 13   Global Physical Health Score 14 17   PROMIS TOTAL - SUBSCORES 26 30      [place] Suicide Severity Rating Scale (Short Version)      4/15/2024     9:00 AM 4/22/2024     8:00 AM 5/6/2024     1:00 PM 5/20/2024     9:00 AM 6/3/2024     9:00 AM 6/17/2024     9:00 AM 7/1/2024     9:00 AM   [place] Suicide Severity Rating (Short Version)   1. Wish to be Dead (Since Last Contact) N N N N N N N   2. Non-Specific Active Suicidal Thoughts (Since Last Contact) N N N N N N N   Actual Attempt (Since Last Contact) N N N N N N N   Has subject engaged in non-suicidal self-injurious behavior? (Since Last Contact) N N N N N N N   Interrupted Attempts (Since Last Contact) N N N N N N N   Aborted or Self-Interrupted Attempt (Since Last Contact) N N N N N N N   Preparatory Acts or Behavior (Since Last Contact) N N N N N N N   Suicide (Since Last Contact) N N N N N N N   Calculated C-SSRS Risk Score (Since Last Contact) No Risk Indicated No Risk Indicated No Risk Indicated No Risk Indicated No Risk Indicated No Risk Indicated No Risk Indicated          ASSESSMENT: Current Emotional / Mental Status (status of significant symptoms):   Risk status (Self / Other harm or suicidal ideation)   Patient denies current fears or concerns for personal safety.   Patient denies current or recent suicidal ideation or behaviors.   Patient denies current or recent homicidal ideation or behaviors.   Patient denies current or recent self injurious behavior or ideation.   Patient denies other safety concerns.   Patient reports there has been no change in risk factors since their last session.     Patient reports there has been no change in protective factors since their last session.     Recommended that patient call 911 or go to the local ED should there be a change in any of these risk factors.     Appearance:   Appropriate    Eye Contact:   Good    Psychomotor Behavior: Normal    Attitude:   Cooperative     Orientation:   All   Speech    Rate / Production: Normal     Volume:  Normal    Mood:    Normal   Affect:    Appropriate    Thought Content:  Clear    Thought Form:  Coherent  Logical    Insight:    Good      Medication Review:   No changes to current psychiatric medication(s)     Medication Compliance:   Yes     Changes in Health Issues:   None reported     Chemical Use Review:   Substance Use: Chemical use reviewed, no active concerns identified      Tobacco Use: No current tobacco use.      Diagnoses:   300.02 (F41.1) Generalized Anxiety Disorder    Collateral Reports Completed:   TC Collateral: [facility] electronic medical records reviewed.    PLAN: (Patient Tasks / Therapist Tasks / Other)  Pt with multiple successful sessions at Transition Clinic, today with successful transition/discharge to next LOC:  [facility]    Is this the patient's last discharge?: Yes. Reason for Discharge Successfully connected with next level of care Level of care: Outpatient Counseling / Psychiatry    Procedure Code: Psychotherapy (with patient) - 30  [CPT 90590]    JOSE MIGUEL WARREN                                                          Safety plan reviewed with pt, pt agrees to follow, safety plan made available to pt, safety plan at the bottom of this note, and in pts active mychart.   ____________________________________________________________________________________________________________________________  Treatment Plan     Patient's Name: Jose Lizama                YOB: 1990  Date of Creation: 2/26/24  Date Treatment Plan Last Reviewed/Revised: Initial     DSM5 Diagnoses: 300.02 (F41.1) Generalized Anxiety Disorder  Psychosocial / Contextual Factors: Pt with reported situational stressors.  PROMIS (reviewed every 90 days):      Referral / Collaboration:  N/A .     Anticipated number of session for this episode of care: 6-9 sessions  Anticipation frequency of session:  Weekly  Anticipated Duration of each session: 38-52 minutes  Treatment plan will be reviewed in 90 days or when goals have been changed.         MeasurableTreatment Goal(s) related to diagnosis / functional impairment(s)  Goal 1: Patient will reduce overall frequency, intensity, and duration of the anxiety so that daily functioning is not impaired.       Objective #A                      Patient will describe situations, thoughts, feelings, and actions associated with anxieties and worries, their impact on functioning, and attempts to resolve them.      Intervention(s)      LMHP will focus on developing a level of trust with the patient; provide support and empathy to encourage the client to feel safe in expressing their symptoms.       LMHP will ask the patient to describe their past experiences of anxiety and their impact on functioning: assess the focus, excessiveness, and uncontrollability of the worry and the type, frequency, intensity, and duration of their anxiety symptoms.       Goal 2: Patient will learn and implement coping skills that result in a reduction of anxiety and worry and improved daily functioning.       Objective A                        Patient will learn and implement calming skills to reduce overall anxiety and manage anxiety symptoms      Objective B      Patient will verbalize understanding of the role that cognitive biases play in excessive irrational worry and persistent anxiety symptoms.       Intervention(s)      LMHP will teach the patient calming/relaxation skills and how to differentiate between relaxation and tension while teaching the patient how to implement these strategies in their daily life.       LMHP will assist the patient in analyzing their worries by examining cognitive distortions and the patient s ability to control their emotions surrounding cognitive distortions..        Patient has reviewed and agreed to the above plan.        MARIN FROST                February  "25, 2024     ___________________________________________________________________________________________________________________________  Patient has no change in safety concerns. Committed to safety and agreed to follow previously developed safety plan.  Reviewed safety plan with pt, pt agreed to follow, safety plan made available to pt, safety plan at the bottom of this note and in pts active mychart.     Safety Plan       If I am feeling unsafe or I am in a crisis, I will:   -Contact my established care providers   -Call the National Suicide Prevention Lifeline: [phone]   -Go to the nearest emergency room   -Call 325      Warning signs that I or other people might notice when a crisis is developing for me:  increased \"big feelings\"  -Decreased appetite,    -Decreased sleep   -Restlessness   -Increased thoughts about wanting to die   -Increased irritability or agitation.       Things I am able to do on my own to cope or help me feel better:    -take a time-out. Practice yoga, listen to music, meditate, get a massage, or learn relaxation techniques. Stepping back from the problem helps clear your head.   -Eat well-balanced meals. Do not skip any meals. Do keep healthful, energy-boosting snacks on hand.   -Limit alcohol and caffeine   -Get enough sleep. When stressed, your body needs additional sleep and rest.    -Exercise daily to help you feel good and maintain your health.   -Accept that you cannot control everything. Put your stress in perspective: Is it really as bad as you think?    -Welcome humor. A good laugh goes a long way.      Things that I am able to do with others to cope or help me better:    -Listen and talk about concerns   -Continue to follow with outpatient care providers and case management    -Go for a walk   -Distract with going out to eat, to a movie or a park.       Things I can use or do for distraction:    -Watch a TV show. If you don't have cable or a subscription service, many " television networks offer free access, without a log-in, until you get closer to the most recent episodes.   -Watch a movie. Light-hearted comedy, drama to suck you in, or an old favorite - there are countless films to whisk you away for a bit.   -Sing. It doesn't matter if you're a professional vocalist or can't to carry a tune, singing engages a completely different part of your brain. Plus, the vibrations in your chest give great sensory feedback and the vocalization reminds you of your voice.   -Watch cute videos on [employer]. About as low-effort as it gets: puppy/kassy videos, laugh challenges, or Vine compilations - take your pick.  -Mindless doodles/finger painting/playing with marcial. This may be especially helpful to those with child parts (DID/OSDD) who need an activity of their own.   -Grab a snack.   -Drum on a surface. Like singing, the vibrations and bilateral stimulation of your hands thumping will engage different parts of your mind and bring your attention away from what's intruding on you.   -Play a game or use a fun evan on your phone. Even if you aren't a , search the evan store. You might find one that speaks to you. It can be a great escape to get lost in for a bit.   -Video games. Any console, any game!   -Tear out words/photos/etc for a collage. Ask a local doctor's office or hairdresser for their spare magazines. Mindlessly rip out photos and words that speak to you. (Bonus: you may get to put tabloids to good use for once! They often have the scathing, overdramatic words that happen to be great for a therapeutic collages. Shocking! Betrayal! You Won't Believe It!).   -Discover new music. [employer]fon, [employer] -[place], so many ways to find new gems!   -Wash your face/hands or brush your teeth. A quick refresher can help you restart your day on a brand new page.   -Re-watch highlights from your favorite sport. It's easy to forget just how many epic, captivating moments there were once some time  has passed. Relive your excitement. Plus, you already know how it ends, so you don't have to pay super close attention!   -Gratitude list. When your mind only wants to remind you of distressing things, focusing on 10+ things you're grateful for can really take you to a whole new atmosphere in your mind and heart.  -Imagery exercises. Containment exercises, healing pool/healing light, guided meditation, so many options!   -Play a board game with a friend. Something simple like Sorry!, challenging like chess, or silly like Cards Against Humanity, there are lots of options to distract you in the company of friends.   -Card games. Solo works, too, if there's no one around.      Changes I can make to support my mental health and wellness:      1. Value yourself: Treat yourself with kindness and respect, and avoid self-criticism. Make time for your hobbies and favorite projects, or broaden your horizons. Do a daily crossword puzzle, plant a garden, take dance lessons, learn to play an instrument or become fluent in another language.      2. Take care of your body: Taking care of yourself physically can improve your mental health. Be sure to:   -Eat nutritious meals   -Avoid smoking and vaping-  -Drink plenty of water   -Exercise, which helps decrease depression and anxiety and improve moods   -Get enough sleep. Researchers believe that lack of sleep contributes to a high rate of depression in college students.       3. Surround yourself with good people: People with strong family or social connections are generally healthier than those who lack a support network. Make plans with supportive family members and friends, or seek out activities where you can meet new people, such as a club, class or support group.      4. Give yourself: Volunteer your time and energy to help someone else. You'll feel good about doing something tangible to help someone in need -- and it's a great way to meet new people. See Fun and Cheap Things  "to do in [place] for ideas.      5. Learn how to deal with stress: Like it or not, stress is a part of life. Practice good coping skills: Try One-Minute Stress Strategies, do Rehan Chi, exercise, take a nature walk, play with your pet or try journal writing as a stress reducer. Also, remember to smile and see the humor in life. Research shows that laughter can boost your immune system, ease pain, relax your body and reduce stress.      6. Quiet your mind: Try meditating, Mindfulness and/or prayer. Relaxation exercises and prayer can improve your state of mind and outlook on life. In fact, research shows that meditation may help you feel calm and enhance the effects of therapy. To get connected, see spiritual resources on Personal Well-being for Students      7. Set realistic goals: Decide what you want to achieve academically, professionally and personally, and write down the steps you need to realize your goals. Aim high, but be realistic and don't over-schedule. You'll enjoy a tremendous sense of accomplishment and self-worth as you progress toward your goal. Wellness Coaching, free to - students, can help you develop goals and stay on track.       8. Break up the monotony: Although our routines make us more efficient and enhance our feelings of security and safety, a little change of pace can perk up a tedious schedule. Alter your jogging route, plan a road-trip, take a walk in a different park, hang some new pictures or try a new restaurant.      9. Avoid alcohol and other drugs: Keep alcohol use to a minimum and avoid other drugs. Sometimes people use alcohol and other drugs to \"self-medicate\" but in reality, alcohol and other drugs only aggravate problems.      10. Get help when you need it: Seeking help is a sign of strength -- not a weakness. And it is important to remember that treatment is effective. People who get appropriate care can recover from mental illness and addiction and lead full, rewarding " lives.      People in my life that I can ask for help: parents, friends  -Spouse   -Friends   -Therapist    -Other Mental health Supportive Services

## 2024-07-02 NOTE — PROGRESS NOTES
07/01/24 0500   Appointment Info   Signing clinician's name / credentials Indira Horner DPT   Total/Authorized Visits 8 per plan   Visits Used 4   Medical Diagnosis Myalgia of the pelvic floor   PT Tx Diagnosis Pelvic floor dysfunction   Progress Note/Certification   Onset of illness/injury or Date of Surgery 02/12/24   Therapy Frequency 1x per week, tapering to 2x per month   Predicted Duration 3 months   Progress Note Due Date 06/30/24   Progress Note Completed Date 04/01/24   PT Goal 1   Goal Identifier [place]   Goal Description Pt will be able to have vaginal penetrative intercourse with 0/10 pain.   Rationale to maximize safety and independence with performance of ADLs and functional tasks   Goal Progress pt having 2-3/10p   Target Date 06/30/24   Subjective Report   Subjective Report She has been successfully using condoms and has not used her Nuva ring since 5/20/24. She was having irritation above her pubic bone/urethra region where Nuva ring was resting. She has had sex 3ish times and it was painful. Lubrication did help one of the times when she had pain. She notes her pain feels tender and when she inserts about 1 knuckle in vaginally that will reproduce her symptoms. Her pain is about a 2-3/10 with intercourse. She does not push into pain with intercourse. Her pain is more with removal. She denies any signs of an infection. Her symptoms have been going on for about 2 years. She notes some days she will urinate more frequent with large amounts of urine each time she voids. She does feel as though she empties fully with voiding. Denies any urinary incontinence.   Objective Measures   Objective Measures Objective Measure 1   Objective Measure 1   Objective Measure internal vaginal PFM   Details reproduction of pain with palpaiton of urethra, and introitus bilaterally, slight tension of levator ani on left side that reduced tenderness to palpation after kegels. No change in pain to palpaiton  of introitus after kegels. Observation of vulva: unremarkable, strength: 4/5, good relaxation, quick flicks: 10/10   Treatment Interventions (PT)   Interventions Therapeutic Activity;Therapeutic Procedure/Exercise   Therapeutic Activity   Therapeutic Activities: dynamic activities to improve functional performance minutes (50103) 34   Ther Act 1 Instructed pt use dilator every other day with lubricaiton and inserting for 10 minutes to see if it helps with reducing irritaiton.   Ther Act 1 - Details Writer will contact MD due to increase in tissue sensitivity near vulva that seems to be unrelated to pelvic floor muscle tension.   PTRx Ther Act 1 Increased time assessing PFM function and pt's symptoms.   Skilled Intervention Education see above.   Patient Response/Progress Pt understood and had no further questions.   Education   Learner/Method Patient;No Barriers to Learning   Plan   Home program PTrx on phone   Updates to plan of care trial of dilators   Plan for next session check in with MD?   Total Session Time   Timed Code Treatment Minutes 34   Total Treatment Time (sum of timed and untimed services) 34

## 2024-07-08 ENCOUNTER — TELEPHONE (OUTPATIENT)
Dept: DERMATOLOGY | Facility: CLINIC | Age: 34
End: 2024-07-08

## 2024-07-08 ENCOUNTER — MYC MEDICAL ADVICE (OUTPATIENT)
Dept: DERMATOLOGY | Facility: CLINIC | Age: 34
End: 2024-07-08
Payer: COMMERCIAL

## 2024-07-08 NOTE — TELEPHONE ENCOUNTER
Agueda Pennington44 minutes ago (11:27 AM)     DL  Hello,  I checked the patients insurance & NO prior auth is required for the [facility] services.  Coverage is based on medical necessity.  Thank you,  Agueda

## 2024-07-09 ENCOUNTER — TELEPHONE (OUTPATIENT)
Dept: DERMATOLOGY | Facility: CLINIC | Age: 34
End: 2024-07-09

## 2024-07-09 NOTE — TELEPHONE ENCOUNTER
Writer spoke with patient on this date.     RE: Hand and Foot Box not working.    Patient would like to be notified when machine is working.    Gayle Coker LPN

## 2024-07-12 ENCOUNTER — OFFICE VISIT (OUTPATIENT)
Dept: FAMILY MEDICINE | Facility: CLINIC | Age: 34
End: 2024-07-12
Payer: COMMERCIAL

## 2024-07-12 ENCOUNTER — NURSE TRIAGE (OUTPATIENT)
Dept: FAMILY MEDICINE | Facility: CLINIC | Age: 34
End: 2024-07-12
Payer: COMMERCIAL

## 2024-07-12 VITALS
BODY MASS INDEX: 26.96 KG/M2 | HEART RATE: 77 BPM | WEIGHT: 142.8 LBS | SYSTOLIC BLOOD PRESSURE: 129 MMHG | RESPIRATION RATE: 22 BRPM | HEIGHT: 61 IN | TEMPERATURE: 98.5 F | OXYGEN SATURATION: 98 % | DIASTOLIC BLOOD PRESSURE: 75 MMHG

## 2024-07-12 DIAGNOSIS — Z11.4 SCREENING FOR HIV (HUMAN IMMUNODEFICIENCY VIRUS): Primary | ICD-10-CM

## 2024-07-12 DIAGNOSIS — R10.2 PELVIC PAIN IN FEMALE: ICD-10-CM

## 2024-07-12 DIAGNOSIS — N83.8 OVARIAN MASS: ICD-10-CM

## 2024-07-12 DIAGNOSIS — Z11.59 NEED FOR HEPATITIS C SCREENING TEST: ICD-10-CM

## 2024-07-12 LAB
ALBUMIN UR-MCNC: NEGATIVE MG/DL
APPEARANCE UR: CLEAR
BILIRUB UR QL STRIP: NEGATIVE
COLOR UR AUTO: YELLOW
GLUCOSE UR STRIP-MCNC: NEGATIVE MG/DL
HCG UR QL: NEGATIVE
HGB UR QL STRIP: NEGATIVE
KETONES UR STRIP-MCNC: NEGATIVE MG/DL
LEUKOCYTE ESTERASE UR QL STRIP: NEGATIVE
NITRATE UR QL: NEGATIVE
PH UR STRIP: 6 [PH] (ref 5–7)
SP GR UR STRIP: <=1.005 (ref 1–1.03)
UROBILINOGEN UR STRIP-ACNC: 0.2 E.U./DL

## 2024-07-12 PROCEDURE — 99213 OFFICE O/P EST LOW 20 MIN: CPT | Performed by: INTERNAL MEDICINE

## 2024-07-12 PROCEDURE — 81003 URINALYSIS AUTO W/O SCOPE: CPT | Performed by: INTERNAL MEDICINE

## 2024-07-12 PROCEDURE — 81025 URINE PREGNANCY TEST: CPT | Performed by: INTERNAL MEDICINE

## 2024-07-12 ASSESSMENT — PAIN SCALES - GENERAL: PAINLEVEL: MODERATE PAIN (5)

## 2024-07-12 NOTE — PROGRESS NOTES
"  Assessment & Plan   Problem List Items Addressed This Visit    None  Visit Diagnoses       Screening for HIV (human immunodeficiency virus)    -  Primary    Need for hepatitis C screening test        Pelvic pain in female        Relevant Orders    UA Macroscopic with reflex to Microscopic and Culture - Lab Collect (Completed)    HCG qualitative urine (Completed)    US Pelvic Complete with Transvaginal                  BMI  Estimated body mass index is 26.79 kg/m  as calculated from the following:    Height as of this encounter: 1.555 m (5' 1.22\").    Weight as of this encounter: 64.8 kg (142 lb 12.8 oz).   Weight management plan: Discussed healthy diet and exercise guidelines    Work on weight loss  Regular exercise      Vita Garcia is a 34 year old, presenting for the following health issues:  Abdominal Pain and Health Maintenance        7/12/2024    10:35 AM   Additional Questions   Roomed by ivon patterson         7/12/2024   Forms   Any forms needing to be completed Yes        History of Present Illness       Reason for visit:  Pelvic or ovarian pain on right side  Symptom onset:  1-3 days ago  Symptom intensity:  Moderate  Symptom progression:  Worsening  Had these symptoms before:  No  What makes it worse:  Walking  What makes it better:  Sitting    She eats 2-3 servings of fruits and vegetables daily.She consumes 0 sweetened beverage(s) daily.She exercises with enough effort to increase her heart rate 10 to 19 minutes per day.  She exercises with enough effort to increase her heart rate 3 or less days per week.   She is taking medications regularly.       Cysts.   Pain when walking started yesterday. Currently 2-3/10 and 5-6/10 for its worse.   No appendicitis in the past.   No fever.   Uti in the past.   Off OCp  No concern over pregnacy or change in sxual practice  Some eczema    Pelvic floor   In addition - cannabis - no nicotine.  Night time                 Review of Systems  Constitutional, HEENT, " "cardiovascular, pulmonary, gi and gu systems are negative, except as otherwise noted.      Objective    /75 (BP Location: Left arm, Patient Position: Chair, Cuff Size: Adult Regular)   Pulse 77   Temp 98.5  F (36.9  C) (Oral)   Resp 22   Ht 1.555 m (5' 1.22\")   Wt 64.8 kg (142 lb 12.8 oz)   LMP 06/19/2024 (Exact Date)   SpO2 98%   BMI 26.79 kg/m    Body mass index is 26.79 kg/m .  Physical Exam   GENERAL: alert and no distress  EYES: Eyes grossly normal to inspection, PERRL and conjunctivae and sclerae normal  HENT: ear canals and TM's normal, nose and mouth without ulcers or lesions  NECK: no adenopathy, no asymmetry, masses, or scars  RESP: lungs clear to auscultation - no rales, rhonchi or wheezes  CV: regular rate and rhythm, normal S1 S2, no S3 or S4, no murmur, click or rub, no peripheral edema  ABDOMEN: pain right lower quadrant no rebound or guardsing    MS: no gross musculoskeletal defects noted, no edema  SKIN: no suspicious lesions or rashes  NEURO: Normal strength and tone, mentation intact and speech normal  BACK: no CVA tenderness, no paralumbar tenderness  PSYCH: mentation appears normal, affect normal/bright  LYMPH: no cervical, supraclavicular, axillary, or inguinal adenopathy    Results for orders placed or performed in visit on 07/12/24   UA Macroscopic with reflex to Microscopic and Culture - Lab Collect     Status: Normal    Specimen: Urine, Midstream   Result Value Ref Range    Color Urine Yellow Colorless, Straw, Light Yellow, Yellow    Appearance Urine Clear Clear    Glucose Urine Negative Negative mg/dL    Bilirubin Urine Negative Negative    Ketones Urine Negative Negative mg/dL    Specific Gravity Urine <=1.005 1.003 - 1.035    Blood Urine Negative Negative    pH Urine 6.0 5.0 - 7.0    Protein Albumin Urine Negative Negative mg/dL    Urobilinogen Urine 0.2 0.2, 1.0 E.U./dL    Nitrite Urine Negative Negative    Leukocyte Esterase Urine Negative Negative    Narrative    " Microscopic not indicated   HCG qualitative urine     Status: Normal   Result Value Ref Range    hCG Urine Qualitative Negative Negative           Signed Electronically by: Cecilio Burks MD

## 2024-07-12 NOTE — TELEPHONE ENCOUNTER
Spoke with patient. Patient calling to further discuss symptoms and requesting appointment.    Patient explains she is suspecting potential right-sided ovarian cyst or PCOS symptoms. She explains this morning she had felt pressure and sharpness on her right side, and at times she notices pain when walking.    Patient is at a 3/10 for pain currently, however at a 4/10 when walking.     Patient declines any additional symptoms, and denies fever. Patient still able to walk and do normal activities.     Patient had palpated area of pain, and felt slight tenderness as well as swelling.    Patient explains she will be out of town for the rest of the weekend starting tomorrow, and will be working today. Patient requesting appointment for 7/16 as this is when she is next available and in [place] again.    Assisted with booking appointment with PCP on 7/16 at 11 am as requested. Advised that patient should be seen sooner for further evaluation if symptoms worsen in the meantime, and to be seen in ER in the state that she will be traveling in. Patient verbalized understanding and has no further questions at this time.     CALVIN Handy RN  [facility]  Reason for Disposition   Patient wants to be seen    Additional Information   Negative: Passed out (i.e., fainted, collapsed and was not responding)   Negative: Shock suspected (e.g., cold/pale/clammy skin, too weak to stand, low BP, rapid pulse)   Negative: Sounds like a life-threatening emergency to the triager   Negative: Followed an abdomen (stomach) injury   Negative: Chest pain   Negative: Abdominal pain and pregnant < 20 weeks   Negative: Abdominal pain and pregnant 20 or more weeks   Negative: Pain is mainly in upper abdomen (if needed ask: 'is it mainly above the belly button?')   Negative: Abdomen bloating or swelling are main symptoms   Negative: SEVERE abdominal pain (e.g., excruciating)   Negative: Vomiting red blood or black (coffee ground)  material   Negative: Blood in bowel movements  (Exception: Blood on surface of BM with constipation.)   Negative: Black or tarry bowel movements  (Exception: Chronic-unchanged black-grey BMs AND is taking iron pills or Pepto-Bismol.)   Negative: MILD TO MODERATE constant pain lasting > 2 hours   Negative: Vomiting bile (green color)   Negative: Patient sounds very sick or weak to the triager   Negative: Vomiting and abdomen looks much more swollen than usual   Negative: White of the eyes have turned yellow (i.e., jaundice)   Negative: Blood in urine (red, pink, or tea-colored)   Negative: Fever > 103 F (39.4 C)   Negative: Fever > 101 F (38.3 C) and over 60 years of age   Negative: Fever > 100.0 F (37.8 C) and has diabetes mellitus or a weak immune system (e.g., HIV positive, cancer chemotherapy, organ transplant, splenectomy, chronic steroids)   Negative: Fever > 100.0 F (37.8 C) and bedridden (e.g., CVA, chronic illness, recovering from surgery)   Negative: Pregnant or could be pregnant (i.e., missed last menstrual period)   Negative: MODERATE pain (e.g., interferes with normal activities that comes and goes (cramps) lasts > 24 hours  (Exception: Pain with Vomiting or Diarrhea - see that Protocol.)   Negative: Unusual vaginal discharge   Negative: Age > 60 years    Protocols used: Abdominal Pain - Female-A-[place]

## 2024-07-15 ENCOUNTER — VIRTUAL VISIT (OUTPATIENT)
Dept: PSYCHOLOGY | Facility: CLINIC | Age: 34
End: 2024-07-15
Payer: COMMERCIAL

## 2024-07-15 DIAGNOSIS — F41.1 GENERALIZED ANXIETY DISORDER: Primary | ICD-10-CM

## 2024-07-15 PROCEDURE — 90791 PSYCH DIAGNOSTIC EVALUATION: CPT | Mod: 95

## 2024-07-15 ASSESSMENT — PATIENT HEALTH QUESTIONNAIRE - PHQ9
SUM OF ALL RESPONSES TO PHQ QUESTIONS 1-9: 5
10. IF YOU CHECKED OFF ANY PROBLEMS, HOW DIFFICULT HAVE THESE PROBLEMS MADE IT FOR YOU TO DO YOUR WORK, TAKE CARE OF THINGS AT HOME, OR GET ALONG WITH OTHER PEOPLE: SOMEWHAT DIFFICULT
SUM OF ALL RESPONSES TO PHQ QUESTIONS 1-9: 5
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
## 2024-07-16 ENCOUNTER — MEDICAL CORRESPONDENCE (OUTPATIENT)
Dept: HEALTH INFORMATION MANAGEMENT | Facility: CLINIC | Age: 34
End: 2024-07-16

## 2024-07-16 ENCOUNTER — TELEPHONE (OUTPATIENT)
Dept: DERMATOLOGY | Facility: CLINIC | Age: 34
End: 2024-07-16

## 2024-07-16 NOTE — TELEPHONE ENCOUNTER
Faxed [facility] home phototherapy order form, insurance information, clinic notes and letter of medical necessity to [facility] at [phone]    Form sent to scanning.

## 2024-07-17 ENCOUNTER — ANCILLARY PROCEDURE (OUTPATIENT)
Dept: ULTRASOUND IMAGING | Facility: CLINIC | Age: 34
End: 2024-07-17
Attending: INTERNAL MEDICINE
Payer: COMMERCIAL

## 2024-07-17 ENCOUNTER — LAB REQUISITION (OUTPATIENT)
Dept: LAB | Facility: CLINIC | Age: 34
End: 2024-07-17

## 2024-07-17 DIAGNOSIS — R10.2 PELVIC PAIN IN FEMALE: ICD-10-CM

## 2024-07-17 LAB — SARS-COV-2 RNA RESP QL NAA+PROBE: NEGATIVE

## 2024-07-17 PROCEDURE — 76856 US EXAM PELVIC COMPLETE: CPT | Mod: GC | Performed by: RADIOLOGY

## 2024-07-17 PROCEDURE — 87635 SARS-COV-2 COVID-19 AMP PRB: CPT | Performed by: INTERNAL MEDICINE

## 2024-07-17 PROCEDURE — 76830 TRANSVAGINAL US NON-OB: CPT | Mod: GC | Performed by: RADIOLOGY

## 2024-07-18 ENCOUNTER — MYC MEDICAL ADVICE (OUTPATIENT)
Dept: FAMILY MEDICINE | Facility: CLINIC | Age: 34
End: 2024-07-18
Payer: COMMERCIAL

## 2024-07-19 NOTE — TELEPHONE ENCOUNTER
Jose, the area in the ovary does not have the normal characteristics of a simple cyst .  We need to image it with a different tool ( the MRI) and I would like to get you a referral to see the OB/GYN in follow up for further evaluation .  It idoes not have worrisome features right now, but needs better characterization to know what to do next   Written by Cecilio Burks MD on 7/18/2024  1:00 PM CDT  Seen by patient Jose Lizama on 7/19/2024  9:58 AM      Rosmery Terrell RN on 7/19/2024 at 11:59 AM

## 2024-07-22 ENCOUNTER — VIRTUAL VISIT (OUTPATIENT)
Dept: PSYCHOLOGY | Facility: CLINIC | Age: 34
End: 2024-07-22
Payer: COMMERCIAL

## 2024-07-22 DIAGNOSIS — F41.1 GENERALIZED ANXIETY DISORDER: Primary | ICD-10-CM

## 2024-07-22 PROCEDURE — 90837 PSYTX W PT 60 MINUTES: CPT | Mod: 95

## 2024-07-22 ASSESSMENT — PATIENT HEALTH QUESTIONNAIRE - PHQ9
SUM OF ALL RESPONSES TO PHQ QUESTIONS 1-9: 3
10. IF YOU CHECKED OFF ANY PROBLEMS, HOW DIFFICULT HAVE THESE PROBLEMS MADE IT FOR YOU TO DO YOUR WORK, TAKE CARE OF THINGS AT HOME, OR GET ALONG WITH OTHER PEOPLE: SOMEWHAT DIFFICULT
SUM OF ALL RESPONSES TO PHQ QUESTIONS 1-9: 3

## 2024-07-22 NOTE — PROGRESS NOTES
[facility]                                     Progress Note    Patient Name: Jose Lizama  Date: 7/22/2024         Service Type: Individual      Session Start Time: 10:02 am  Session End Time: 10:55 am     Session Length: 53 min    Session #: 2    Attendees: Client attended alone    Service Modality:  Video Visit:      Provider verified identity through the following two step process.  Patient provided:  Patient is known previously to provider    Telemedicine Visit: The patient's condition can be safely assessed and treated via synchronous audio and visual telemedicine encounter.      Reason for Telemedicine Visit: Patient convenience (e.g. access to timely appointments / distance to available provider)    Originating Site (Patient Location): Patient's home    Distant Site (Provider Location): Provider Remote Setting- Home Office    Consent:  The patient/guardian has verbally consented to: the potential risks and benefits of telemedicine (video visit) versus in person care; bill my insurance or make self-payment for services provided; and responsibility for payment of non-covered services.     Patient would like the video invitation sent by:  My Chart    Mode of Communication:  [facility] Conference [facility]    Distant Location (Provider):  Off-site    As the provider I attest to compliance with applicable laws and regulations related to telemedicine.    DATA  Interactive Complexity: No  Crisis: No        Progress Since Last Session (Related to Symptoms / Goals / Homework):   Symptoms: No change 2nd session    Homework: Did not complete      Episode of Care Goals: Minimal progress - CONTEMPLATION (Considering change and yet undecided); Intervened by assessing the negative and positive thinking (ambivalence) about behavior change     Current / Ongoing Stressors and Concerns:  Upcoming MRI of ovaries. Getting support from partner and friends, grateful for knowledge base and access to resources.   Family of origin relational struggles.     Treatment Objective(s) Addressed in This Session:   identify 2-3 initial signs or symptoms of anxiety  Identify triggers for anxiety     Intervention:   Interpersonal Therapy: Patient provided a recap of the past week's events. Provider offered active listening and validation. Family patterns of dysfunction were unearthed.   Discussed boundary options.     Assessments completed prior to visit:  The following assessments were completed by patient for this visit:  PHQ2:       4/19/2024    12:40 PM 2/12/2024    10:06 AM 5/8/2014     3:12 PM 5/8/2014     3:06 PM 4/16/2014     2:55 PM 4/16/2014     2:06 PM 4/18/2012     8:57 AM   PHQ-2 ( 1999 [employer])   Q1: Little interest or pleasure in doing things 0 1 0 0 1 0 0   Q2: Feeling down, depressed or hopeless 0 1 0 0 1 0 0   PHQ-2 Score 0 2 0 0 2 0 0     PHQ9:       4/22/2024     7:24 AM 5/20/2024     8:24 AM 6/3/2024     8:22 AM 6/17/2024     8:26 AM 7/1/2024     8:27 AM 7/15/2024    10:25 AM 7/22/2024     9:49 AM   PHQ-9 SCORE   PHQ-9 Total Score MyChart 7 (Mild depression) 5 (Mild depression) 4 (Minimal depression) 3 (Minimal depression) 3 (Minimal depression) 5 (Mild depression) 3 (Minimal depression)   PHQ-9 Total Score 7 5 4 3 3 5 3     GAD2:       2/5/2024    12:15 PM 3/11/2024     2:29 PM 4/15/2024     8:23 AM 7/15/2024    10:25 AM   YAZMIN-2   Feeling nervous, anxious, or on edge 2 2 1 1   Not being able to stop or control worrying 2 1 1 0   YAZMIN-2 Total Score 4 3 2 1    1     GAD7:       2/5/2024    12:01 PM 2/12/2024    10:06 AM 3/11/2024     2:29 PM 5/20/2024     8:25 AM 6/3/2024     8:22 AM 6/17/2024     8:26 AM 7/1/2024     8:27 AM   YAZMIN-7 SCORE   Total Score 8 (mild anxiety)  6 (mild anxiety) 7 (mild anxiety) 4 (minimal anxiety) 4 (minimal anxiety) 6 (mild anxiety)   Total Score 8 8 6 7 4 4 6     CAGE-AID:       2/5/2024    12:19 PM   CAGE-AID Total Score   Total Score    Total Score MyChart        Information is  confidential and restricted. Go to Review Flowsheets to unlock data.     PROMIS 10-Global Health (only subscores and total score):       2/5/2024    12:17 PM 5/6/2024    12:24 PM 7/15/2024    10:25 AM   PROMIS-10 Scores Only   Global Mental Health Score   14    14   Global Physical Health Score   16    16   PROMIS TOTAL - SUBSCORES   30    30       Information is confidential and restricted. Go to Review Flowsheets to unlock data.    Multiple values from one day are sorted in reverse-chronological order     [place]man Suicide Severity Rating Scale (Lifetime/Recent)      2/5/2024     1:00 PM   [place]man Suicide Severity Rating (Lifetime/Recent)   Q1 Wish to be Dead (Lifetime) N   Q2 Non-Specific Active Suicidal Thoughts (Lifetime) N   Actual Attempt (Lifetime) N   Has subject engaged in non-suicidal self-injurious behavior? (Lifetime) N   Interrupted Attempts (Lifetime) N   Aborted or Self-Interrupted Attempt (Lifetime) N   Preparatory Acts or Behavior (Lifetime) N   Calculated C-SSRS Risk Score (Lifetime/Recent) No Risk Indicated         ASSESSMENT: Current Emotional / Mental Status (status of significant symptoms):   Risk status (Self / Other harm or suicidal ideation)   Patient denies current fears or concerns for personal safety.   Patient denies current or recent suicidal ideation or behaviors.   Patient denies current or recent homicidal ideation or behaviors.   Patient denies current or recent self injurious behavior or ideation.   Patient denies other safety concerns.   Patient reports there has been no change in risk factors since their last session.     Patient reports there has been no change in protective factors since their last session.     Recommended that patient call 911 or go to the local ED should there be a change in any of these risk factors.     Appearance:   Appropriate    Eye Contact:   Good    Psychomotor Behavior: Normal    Attitude:   Cooperative  Pleasant   Orientation:   All   Speech    Rate  / Production: Normal/ Responsive Normal     Volume:  Normal    Mood:    Anxious  Normal   Affect:    Appropriate  Expansive    Thought Content:  Clear    Thought Form:  Coherent  Goal Directed  Logical    Insight:    Good  and Intellectual Insight     Medication Review:   No current psychiatric medications prescribed     Medication Compliance:   NA     Changes in Health Issues:   None reported     Chemical Use Review:   Substance Use: Chemical use reviewed, no active concerns identified      Tobacco Use: No current tobacco use.      Diagnosis:  1. Generalized anxiety disorder        Collateral Reports Completed:   Not Applicable    PLAN: (Patient Tasks / Therapist Tasks / Other)  Consider family boundaries, revisit Boni Denson, [facility]  7/22/2024                                                           ______________________________________________________________________

## 2024-07-29 ENCOUNTER — HOSPITAL ENCOUNTER (OUTPATIENT)
Dept: MRI IMAGING | Facility: CLINIC | Age: 34
Discharge: HOME | End: 2024-07-29
Attending: INTERNAL MEDICINE | Admitting: INTERNAL MEDICINE
Payer: COMMERCIAL

## 2024-07-29 DIAGNOSIS — N83.8 OVARIAN MASS: ICD-10-CM

## 2024-07-29 PROCEDURE — 255N000002 HC RX 255 OP 636: Performed by: INTERNAL MEDICINE

## 2024-07-29 PROCEDURE — A9585 GADOBUTROL INJECTION: HCPCS | Performed by: INTERNAL MEDICINE

## 2024-07-29 PROCEDURE — 72197 MRI PELVIS W/O & W/DYE: CPT | Mod: 26 | Performed by: RADIOLOGY

## 2024-07-29 PROCEDURE — 72197 MRI PELVIS W/O & W/DYE: CPT

## 2024-07-29 RX ORDER — GADOBUTROL 604.72 MG/ML
6.4 INJECTION INTRAVENOUS ONCE
Status: COMPLETED | OUTPATIENT
Start: 2024-07-29 | End: 2024-07-29

## 2024-07-29 RX ADMIN — GADOBUTROL 6.4 ML: 604.72 INJECTION INTRAVENOUS at 16:50

## 2024-07-30 ENCOUNTER — MYC MEDICAL ADVICE (OUTPATIENT)
Dept: FAMILY MEDICINE | Facility: CLINIC | Age: 34
End: 2024-07-30
Payer: COMMERCIAL

## 2024-08-05 ENCOUNTER — OFFICE VISIT (OUTPATIENT)
Dept: FAMILY MEDICINE | Facility: CLINIC | Age: 34
End: 2024-08-05
Payer: COMMERCIAL

## 2024-08-05 ENCOUNTER — VIRTUAL VISIT (OUTPATIENT)
Dept: PSYCHOLOGY | Facility: CLINIC | Age: 34
End: 2024-08-05
Payer: COMMERCIAL

## 2024-08-05 VITALS
SYSTOLIC BLOOD PRESSURE: 106 MMHG | RESPIRATION RATE: 18 BRPM | HEART RATE: 72 BPM | HEIGHT: 61 IN | DIASTOLIC BLOOD PRESSURE: 72 MMHG | BODY MASS INDEX: 27.09 KG/M2 | WEIGHT: 143.5 LBS | TEMPERATURE: 97.9 F | OXYGEN SATURATION: 98 %

## 2024-08-05 DIAGNOSIS — M25.531 PAIN IN BOTH WRISTS: Primary | ICD-10-CM

## 2024-08-05 DIAGNOSIS — F41.1 GENERALIZED ANXIETY DISORDER: Primary | ICD-10-CM

## 2024-08-05 DIAGNOSIS — M25.532 PAIN IN BOTH WRISTS: Primary | ICD-10-CM

## 2024-08-05 DIAGNOSIS — F41.9 ANXIETY: ICD-10-CM

## 2024-08-05 DIAGNOSIS — G24.5 EYE TWITCH: ICD-10-CM

## 2024-08-05 DIAGNOSIS — N92.6 IRREGULAR PERIODS: ICD-10-CM

## 2024-08-05 LAB
ESTRADIOL SERPL-MCNC: 85 PG/ML
FERRITIN SERPL-MCNC: 21 NG/ML (ref 6–175)
INSULIN SERPL-ACNC: 10.6 UU/ML (ref 2.6–24.9)
PROLACTIN SERPL 3RD IS-MCNC: 15 NG/ML (ref 5–23)
TSH SERPL DL<=0.005 MIU/L-ACNC: 2.97 UIU/ML (ref 0.3–4.2)
VIT B12 SERPL-MCNC: 294 PG/ML (ref 232–1245)
VIT D+METAB SERPL-MCNC: 42 NG/ML (ref 20–50)

## 2024-08-05 PROCEDURE — 90715 TDAP VACCINE 7 YRS/> IM: CPT | Performed by: PHYSICIAN ASSISTANT

## 2024-08-05 PROCEDURE — 90837 PSYTX W PT 60 MINUTES: CPT | Mod: 95

## 2024-08-05 PROCEDURE — 83525 ASSAY OF INSULIN: CPT | Performed by: PHYSICIAN ASSISTANT

## 2024-08-05 PROCEDURE — 90471 IMMUNIZATION ADMIN: CPT | Performed by: PHYSICIAN ASSISTANT

## 2024-08-05 PROCEDURE — 84443 ASSAY THYROID STIM HORMONE: CPT | Performed by: PHYSICIAN ASSISTANT

## 2024-08-05 PROCEDURE — 82607 VITAMIN B-12: CPT | Performed by: PHYSICIAN ASSISTANT

## 2024-08-05 PROCEDURE — 82728 ASSAY OF FERRITIN: CPT | Performed by: PHYSICIAN ASSISTANT

## 2024-08-05 PROCEDURE — 82670 ASSAY OF TOTAL ESTRADIOL: CPT | Performed by: PHYSICIAN ASSISTANT

## 2024-08-05 PROCEDURE — 99214 OFFICE O/P EST MOD 30 MIN: CPT | Mod: 25 | Performed by: PHYSICIAN ASSISTANT

## 2024-08-05 PROCEDURE — 82306 VITAMIN D 25 HYDROXY: CPT | Performed by: PHYSICIAN ASSISTANT

## 2024-08-05 PROCEDURE — 36415 COLL VENOUS BLD VENIPUNCTURE: CPT | Performed by: PHYSICIAN ASSISTANT

## 2024-08-05 PROCEDURE — 84146 ASSAY OF PROLACTIN: CPT | Performed by: PHYSICIAN ASSISTANT

## 2024-08-05 RX ORDER — HYDROXYZINE HYDROCHLORIDE 25 MG/1
25 TABLET, FILM COATED ORAL
Qty: 90 TABLET | Refills: 1 | Status: SHIPPED | OUTPATIENT
Start: 2024-08-05

## 2024-08-05 RX ORDER — HYDROXYZINE HYDROCHLORIDE 25 MG/1
25 TABLET, FILM COATED ORAL 3 TIMES DAILY PRN
COMMUNITY
End: 2024-08-05

## 2024-08-05 ASSESSMENT — ANXIETY QUESTIONNAIRES
1. FEELING NERVOUS, ANXIOUS, OR ON EDGE: SEVERAL DAYS
GAD7 TOTAL SCORE: 4
4. TROUBLE RELAXING: NOT AT ALL
5. BEING SO RESTLESS THAT IT IS HARD TO SIT STILL: NOT AT ALL
IF YOU CHECKED OFF ANY PROBLEMS ON THIS QUESTIONNAIRE, HOW DIFFICULT HAVE THESE PROBLEMS MADE IT FOR YOU TO DO YOUR WORK, TAKE CARE OF THINGS AT HOME, OR GET ALONG WITH OTHER PEOPLE: SOMEWHAT DIFFICULT
6. BECOMING EASILY ANNOYED OR IRRITABLE: SEVERAL DAYS
7. FEELING AFRAID AS IF SOMETHING AWFUL MIGHT HAPPEN: NOT AT ALL
GAD7 TOTAL SCORE: 4
7. FEELING AFRAID AS IF SOMETHING AWFUL MIGHT HAPPEN: NOT AT ALL
3. WORRYING TOO MUCH ABOUT DIFFERENT THINGS: SEVERAL DAYS
GAD7 TOTAL SCORE: 4
2. NOT BEING ABLE TO STOP OR CONTROL WORRYING: SEVERAL DAYS
8. IF YOU CHECKED OFF ANY PROBLEMS, HOW DIFFICULT HAVE THESE MADE IT FOR YOU TO DO YOUR WORK, TAKE CARE OF THINGS AT HOME, OR GET ALONG WITH OTHER PEOPLE?: SOMEWHAT DIFFICULT

## 2024-08-05 ASSESSMENT — ENCOUNTER SYMPTOMS: NERVOUS/ANXIOUS: 1

## 2024-08-05 ASSESSMENT — PAIN SCALES - GENERAL: PAINLEVEL: MILD PAIN (2)

## 2024-08-05 NOTE — PROGRESS NOTES
Assessment & Plan     Pain in both wrists  Discussed with patient that her symptoms could be related to carpal tunnel. I do recommend wearing wrist braces at night and do daily exercises. Possible hypermobility syndrome with her other joint issues but no confirmed diagnosis at this time     Irregular periods  Patient has appointment with GYN in September to discuss her symptoms in more detail and to maybe get the diagnosis of PCOS however lab work done prior to get the ball rolling.   - TSH with free T4 reflex; Future  - Prolactin; Future  - Estradiol; Future  - Vitamin D Deficiency; Future  - Insulin level; Future  - TSH with free T4 reflex  - Prolactin  - Estradiol  - Vitamin D Deficiency  - Insulin level    Anxiety  Chronic, worsening in the last year. Would like refill on Hydroxyzine. Refills good for 1 year.    Eye twitch  Discussed with patient that eye twitching is usually benign. Discussed staying hydrated, adequate sleep and nutrients. Checking labs today  - Vitamin B12; Future  - Ferritin; Future  - Vitamin B12  - Ferritin            I spent 30 minutes with patient spent counseling and coordinating patient's care as well as discussing patient's plan of care, documenting in EMR and reviewing PMH.      Vita Garcia is a 34 year old, presenting for the following health issues:  Anxiety, PCOS , Thyroid Problem, and congestion      History of Present Illness       Reason for visit:  Want labs/maybe hydroxyzibe for sleep    She eats 2-3 servings of fruits and vegetables daily.She consumes 0 sweetened beverage(s) daily.She exercises with enough effort to increase her heart rate 10 to 19 minutes per day.  She exercises with enough effort to increase her heart rate 3 or less days per week.   She is taking medications regularly.         Concern - Multiple   Description:   Potential PCOS -states abnormal cycles. General lab work; like Thyroid check. Has been on birth control since 2022. Stopped birth control  late May. Had 2 abnormal periods but its normal the last cycle. She has hair growth above her lip. She has gained some weight in the last year.     Eye twitch - years on and off. Triggered by stress and lack of food. When she is really hungry she will have vision changes.     Wrist pain - bilateral wrist aching. 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 She thought it was carpal tunnel so got her wrist braces and changed her desk set up and it did help. But randomly will have wrist pain. She was seeing PHYSICAL THERAPY for pelvic floor pain. She did a couple of tests with them and thought it was hypermobility.     Sleep - Has struggle with sleep in the past. She was on hydroxyzine but it prescribe for anxiety. However, it has helped with sleep. Wondering if she can get refills.       Review of Systems  Constitutional, HEENT, cardiovascular, pulmonary, gi and gu systems are negative, except as otherwise noted.      Objective    LMP 06/19/2024 (Exact Date)   There is no height or weight on file to calculate BMI.  Physical Exam   GENERAL: alert and no distress  NECK: no adenopathy, no asymmetry, masses, or scars  RESP: lungs clear to auscultation - no rales, rhonchi or wheezes  CV: regular rate and rhythm, normal S1 S2, no S3 or S4, no murmur, click or rub, no peripheral edema  ABDOMEN: soft, nontender, no hepatosplenomegaly, no masses and bowel sounds normal  MS: no gross musculoskeletal defects noted, no edema            Signed Electronically by: JEFFERY Mtz

## 2024-08-05 NOTE — PROGRESS NOTES
[facility]                                     Progress Note    Patient Name: Jose Lizama  Date: 8/5/2024         Service Type: Individual      Session Start Time: 1:10 pm  Session End Time: 2:03 pm     Session Length: 53 min    Session #: 3    Attendees: Client attended alone    Service Modality:  Video Visit:      Provider verified identity through the following two step process.  Patient provided:  Patient is known previously to provider    Telemedicine Visit: The patient's condition can be safely assessed and treated via synchronous audio and visual telemedicine encounter.      Reason for Telemedicine Visit: Patient convenience (e.g. access to timely appointments / distance to available provider)    Originating Site (Patient Location): Patient's home    Distant Site (Provider Location): Provider Remote Setting- Home Office    Consent:  The patient/guardian has verbally consented to: the potential risks and benefits of telemedicine (video visit) versus in person care; bill my insurance or make self-payment for services provided; and responsibility for payment of non-covered services.     Patient would like the video invitation sent by:  My Chart    Mode of Communication:  [facility]    Distant Location (Provider):  Off-site    As the provider I attest to compliance with applicable laws and regulations related to telemedicine.    DATA  Interactive Complexity: No  Crisis: No        Progress Since Last Session (Related to Symptoms / Goals / Homework):   Symptoms: Improving situational mood    Homework: Did not complete      Episode of Care Goals: Minimal progress - CONTEMPLATION (Considering change and yet undecided); Intervened by assessing the negative and positive thinking (ambivalence) about behavior change     Current / Ongoing Stressors and Concerns:  MRI of ovaries clear. Continued interest in understanding medical concerns (SOFÍA, et al).  Primary relationship,  family of origin relational struggles.      Treatment Objective(s) Addressed in This Session:   identify 2-3 initial signs or symptoms of anxiety  Identify triggers for anxiety     Intervention:   Interpersonal Therapy: Patient provided a recap of the past week's events. Provider offered active listening and validation.    EMDR: provided introduction, completed safe calm state exercise.    Assessments completed prior to visit:  The following assessments were completed by patient for this visit:  PHQ2:       4/19/2024    12:40 PM 2/12/2024    10:06 AM 5/8/2014     3:12 PM 5/8/2014     3:06 PM 4/16/2014     2:55 PM 4/16/2014     2:06 PM 4/18/2012     8:57 AM   PHQ-2 ( 1999 [employer])   Q1: Little interest or pleasure in doing things 0 1 0 0 1 0 0   Q2: Feeling down, depressed or hopeless 0 1 0 0 1 0 0   PHQ-2 Score 0 2 0 0 2 0 0     PHQ9:       5/20/2024     8:24 AM 6/3/2024     8:22 AM 6/17/2024     8:26 AM 7/1/2024     8:27 AM 7/15/2024    10:25 AM 7/22/2024     9:49 AM 8/5/2024     7:43 AM   PHQ-9 SCORE   PHQ-9 Total Score MyChart 5 (Mild depression) 4 (Minimal depression) 3 (Minimal depression) 3 (Minimal depression) 5 (Mild depression) 3 (Minimal depression) 4 (Minimal depression)   PHQ-9 Total Score 5 4 3 3 5 3 4     GAD2:       2/5/2024    12:15 PM 3/11/2024     2:29 PM 4/15/2024     8:23 AM 7/15/2024    10:25 AM   YAZMIN-2   Feeling nervous, anxious, or on edge 2 2 1 1   Not being able to stop or control worrying 2 1 1 0   YAZMIN-2 Total Score 4 3 2 1    1     GAD7:       2/12/2024    10:06 AM 3/11/2024     2:29 PM 5/20/2024     8:25 AM 6/3/2024     8:22 AM 6/17/2024     8:26 AM 7/1/2024     8:27 AM 8/5/2024     7:44 AM   YAZMIN-7 SCORE   Total Score  6 (mild anxiety) 7 (mild anxiety) 4 (minimal anxiety) 4 (minimal anxiety) 6 (mild anxiety) 4 (minimal anxiety)   Total Score 8 6 7 4 4 6 4     CAGE-AID:       2/5/2024    12:19 PM   CAGE-AID Total Score   Total Score    Total Score MyChart        Information is confidential  and restricted. Go to Review Flowsheets to unlock data.     PROMIS 10-Global Health (only subscores and total score):       2/5/2024    12:17 PM 5/6/2024    12:24 PM 7/15/2024    10:25 AM   PROMIS-10 Scores Only   Global Mental Health Score   14    14   Global Physical Health Score   16    16   PROMIS TOTAL - SUBSCORES   30    30       Information is confidential and restricted. Go to Review Flowsheets to unlock data.    Multiple values from one day are sorted in reverse-chronological order     [place] Suicide Severity Rating Scale (Lifetime/Recent)      2/5/2024     1:00 PM   [place] Suicide Severity Rating (Lifetime/Recent)   Q1 Wish to be Dead (Lifetime) N   Q2 Non-Specific Active Suicidal Thoughts (Lifetime) N   Actual Attempt (Lifetime) N   Has subject engaged in non-suicidal self-injurious behavior? (Lifetime) N   Interrupted Attempts (Lifetime) N   Aborted or Self-Interrupted Attempt (Lifetime) N   Preparatory Acts or Behavior (Lifetime) N   Calculated C-SSRS Risk Score (Lifetime/Recent) No Risk Indicated         ASSESSMENT: Current Emotional / Mental Status (status of significant symptoms):   Risk status (Self / Other harm or suicidal ideation)   Patient denies current fears or concerns for personal safety.   Patient denies current or recent suicidal ideation or behaviors.   Patient denies current or recent homicidal ideation or behaviors.   Patient denies current or recent self injurious behavior or ideation.   Patient denies other safety concerns.   Patient reports there has been no change in risk factors since their last session.     Patient reports there has been no change in protective factors since their last session.     Recommended that patient call 911 or go to the local ED should there be a change in any of these risk factors.     Appearance:   Appropriate    Eye Contact:   Good    Psychomotor Behavior: Normal    Attitude:   Cooperative  Pleasant   Orientation:   All   Speech    Rate /  Production: Normal/ Responsive Normal     Volume:  Normal    Mood:    Anxious  Normal   Affect:    Appropriate  Expansive    Thought Content:  Clear    Thought Form:  Coherent  Goal Directed  Logical    Insight:    Good  and Intellectual Insight     Medication Review:   No current psychiatric medications prescribed     Medication Compliance:   NA     Changes in Health Issues:   None reported     Chemical Use Review:   Substance Use: Chemical use reviewed, no active concerns identified      Tobacco Use: No current tobacco use.      Diagnosis:  1. Generalized anxiety disorder        Collateral Reports Completed:   Not Applicable    PLAN: (Patient Tasks / Therapist Tasks / Other)  Consider family boundaries, revisit Boni, use safe calm state        Kasia Denson, [facility]  8/05/2024                                                           ______________________________________________________________________

## 2024-08-12 ENCOUNTER — MYC MEDICAL ADVICE (OUTPATIENT)
Dept: DERMATOLOGY | Facility: CLINIC | Age: 34
End: 2024-08-12
Payer: COMMERCIAL

## 2024-08-12 NOTE — TELEPHONE ENCOUNTER
Encounter Date: Apr 19, 2024  Office Visit      Reviewed patients past medical history and pertinent chart review prior to patients visit today.      Dermatology Problem List:  # Dyshidrotic eczema   - triamcinolone 0.1% ointment, tacrolimus 0.1% ointment  # Inflamed seborrheic keratosis  - cryotherapy 4/19/2024      Social history: Works as an ID nurse     ____________________________________________     Assessment & Plan:      # Acute atopic dermatitis, right ankle  # Dyshidrotic eczema, hands      - For flares: Start triamcinolone 0.1% ointment twice daily for 2 weeks. Discussed risk of skin atrophy with long term chronic use. Not for face, armpits or groin.   - For maintenance: Start tacrolimus 0.1% ointment twice daily as needed.      - We discussed the importance of daily emollients. Options include Vanicream, CeraVe Cream, Cetaphil Cream, or Vaseline. Use non-scented soaps and detergents.      # Inflamed seborrheic keratoses x1  The benign nature of the skin lesion(s) was discussed with the patient.  Due to the inflamed and irritated nature of the lesions I recommend cryotherapy and the patient was agreeable.

## 2024-08-12 NOTE — TELEPHONE ENCOUNTER
Cherry Johnson PA-C  You; Kari Marshall, CMA2 minutes ago (2:11 PM)     That's perfect, thank you.     You  Cherry Johnson PA-C; Kari Marshall, CMA19 minutes ago (1:54 PM)     MF  Patient got home lights unit. Should she follow instructions for Atopic Dermatitis, skin type 2? Please advise.

## 2024-08-29 ENCOUNTER — LAB REQUISITION (OUTPATIENT)
Dept: LAB | Facility: CLINIC | Age: 34
End: 2024-08-29

## 2024-08-29 PROCEDURE — 87635 SARS-COV-2 COVID-19 AMP PRB: CPT | Performed by: INTERNAL MEDICINE

## 2024-08-30 LAB — SARS-COV-2 RNA RESP QL NAA+PROBE: NEGATIVE

## 2024-09-16 ENCOUNTER — VIRTUAL VISIT (OUTPATIENT)
Dept: PSYCHOLOGY | Facility: CLINIC | Age: 34
End: 2024-09-16
Payer: COMMERCIAL

## 2024-09-16 ENCOUNTER — OFFICE VISIT (OUTPATIENT)
Dept: OBGYN | Facility: CLINIC | Age: 34
End: 2024-09-16
Attending: INTERNAL MEDICINE
Payer: COMMERCIAL

## 2024-09-16 VITALS
DIASTOLIC BLOOD PRESSURE: 77 MMHG | HEART RATE: 75 BPM | BODY MASS INDEX: 26.45 KG/M2 | WEIGHT: 141 LBS | SYSTOLIC BLOOD PRESSURE: 117 MMHG | RESPIRATION RATE: 16 BRPM | TEMPERATURE: 97.6 F

## 2024-09-16 DIAGNOSIS — F41.1 GENERALIZED ANXIETY DISORDER: Primary | ICD-10-CM

## 2024-09-16 DIAGNOSIS — N83.8 OVARIAN MASS: ICD-10-CM

## 2024-09-16 DIAGNOSIS — R10.2 PELVIC PAIN IN FEMALE: ICD-10-CM

## 2024-09-16 PROCEDURE — 90837 PSYTX W PT 60 MINUTES: CPT | Mod: 95

## 2024-09-16 PROCEDURE — 99213 OFFICE O/P EST LOW 20 MIN: CPT | Performed by: OBSTETRICS & GYNECOLOGY

## 2024-09-16 RX ORDER — CYCLOBENZAPRINE HCL 10 MG
10 TABLET ORAL 3 TIMES DAILY PRN
COMMUNITY

## 2024-09-16 NOTE — PROGRESS NOTES
[facility]                                     Progress Note    Patient Name: Jose Lizama  Date: 9/16/2024         Service Type: Individual      Session Start Time: 4:05 pm  Session End Time: 4:58 pm     Session Length: 53 min    Session #: 4    Attendees: Client attended alone    Service Modality:  Video Visit:      Provider verified identity through the following two step process.  Patient provided:  Patient is known previously to provider    Telemedicine Visit: The patient's condition can be safely assessed and treated via synchronous audio and visual telemedicine encounter.      Reason for Telemedicine Visit: Patient convenience (e.g. access to timely appointments / distance to available provider)    Originating Site (Patient Location): Patient's home    Distant Site (Provider Location): Provider Remote Setting- Home Office    Consent:  The patient/guardian has verbally consented to: the potential risks and benefits of telemedicine (video visit) versus in person care; bill my insurance or make self-payment for services provided; and responsibility for payment of non-covered services.     Patient would like the video invitation sent by:  My Chart    Mode of Communication:  [facility]    Distant Location (Provider):  Off-site    As the provider I attest to compliance with applicable laws and regulations related to telemedicine.    DATA  Interactive Complexity: No  Crisis: No        Progress Since Last Session (Related to Symptoms / Goals / Homework):   Symptoms: Improving situational mood    Homework: Did not complete      Episode of Care Goals: Minimal progress - CONTEMPLATION (Considering change and yet undecided); Intervened by assessing the negative and positive thinking (ambivalence) about behavior change     Current / Ongoing Stressors and Concerns:  Continued interest in understanding medical concerns (SOFÍA et al).  Primary relationship, family of origin  relational struggles. MRI of ovaries clear.      Treatment Objective(s) Addressed in This Session:   identify 2-3 initial signs or symptoms of anxiety  Identify triggers for anxiety     Intervention:   Interpersonal Therapy: Patient provided a recap of the past  events/concerns. Provider offered active listening and validation. Surfaced relational concerns regarding intimacy, identified avenues to explore   EMDR: reinforced safe calm state exercise.    Assessments completed prior to visit:  The following assessments were completed by patient for this visit:  PHQ2:       4/19/2024    12:40 PM 2/12/2024    10:06 AM 5/8/2014     3:12 PM 5/8/2014     3:06 PM 4/16/2014     2:55 PM 4/16/2014     2:06 PM 4/18/2012     8:57 AM   PHQ-2 ( 1999 [employer])   Q1: Little interest or pleasure in doing things 0 1 0 0 1 0 0   Q2: Feeling down, depressed or hopeless 0 1 0 0 1 0 0   PHQ-2 Score 0 2 0 0 2 0 0     PHQ9:       6/3/2024     8:22 AM 6/17/2024     8:26 AM 7/1/2024     8:27 AM 7/15/2024    10:25 AM 7/22/2024     9:49 AM 8/5/2024     7:43 AM 9/16/2024     3:51 PM   PHQ-9 SCORE   PHQ-9 Total Score MyChart 4 (Minimal depression) 3 (Minimal depression) 3 (Minimal depression) 5 (Mild depression) 3 (Minimal depression) 4 (Minimal depression) 3 (Minimal depression)   PHQ-9 Total Score 4 3 3 5 3 4 3     GAD2:       2/5/2024    12:15 PM 3/11/2024     2:29 PM 4/15/2024     8:23 AM 7/15/2024    10:25 AM 9/16/2024     3:52 PM   YAZMIN-2   Feeling nervous, anxious, or on edge 2 2 1 1 1   Not being able to stop or control worrying 2 1 1 0 1   YAZMIN-2 Total Score 4 3 2 1    1 2     GAD7:       2/12/2024    10:06 AM 3/11/2024     2:29 PM 5/20/2024     8:25 AM 6/3/2024     8:22 AM 6/17/2024     8:26 AM 7/1/2024     8:27 AM 8/5/2024     7:44 AM   YAZMIN-7 SCORE   Total Score  6 (mild anxiety) 7 (mild anxiety) 4 (minimal anxiety) 4 (minimal anxiety) 6 (mild anxiety) 4 (minimal anxiety)   Total Score 8 6 7 4 4 6 4     CAGE-AID:       2/5/2024    12:19 PM    CAGE-AID Total Score   Total Score    Total Score MyChart        Information is confidential and restricted. Go to Review Flowsheets to unlock data.     PROMIS 10-Global Health (only subscores and total score):       2/5/2024    12:17 PM 5/6/2024    12:24 PM 7/15/2024    10:25 AM   PROMIS-10 Scores Only   Global Mental Health Score   14    14   Global Physical Health Score   16    16   PROMIS TOTAL - SUBSCORES   30    30       Information is confidential and restricted. Go to Review Flowsheets to unlock data.    Multiple values from one day are sorted in reverse-chronological order     [place] Suicide Severity Rating Scale (Lifetime/Recent)      2/5/2024     1:00 PM   [place] Suicide Severity Rating (Lifetime/Recent)   Q1 Wish to be Dead (Lifetime) N   Q2 Non-Specific Active Suicidal Thoughts (Lifetime) N   Actual Attempt (Lifetime) N   Has subject engaged in non-suicidal self-injurious behavior? (Lifetime) N   Interrupted Attempts (Lifetime) N   Aborted or Self-Interrupted Attempt (Lifetime) N   Preparatory Acts or Behavior (Lifetime) N   Calculated C-SSRS Risk Score (Lifetime/Recent) No Risk Indicated         ASSESSMENT: Current Emotional / Mental Status (status of significant symptoms):   Risk status (Self / Other harm or suicidal ideation)   Patient denies current fears or concerns for personal safety.   Patient denies current or recent suicidal ideation or behaviors.   Patient denies current or recent homicidal ideation or behaviors.   Patient denies current or recent self injurious behavior or ideation.   Patient denies other safety concerns.   Patient reports there has been no change in risk factors since their last session.     Patient reports there has been no change in protective factors since their last session.     Recommended that patient call 911 or go to the local ED should there be a change in any of these risk factors.     Appearance:   Appropriate    Eye Contact:   Good    Psychomotor  Behavior: Normal    Attitude:   Cooperative  Pleasant   Orientation:   All   Speech    Rate / Production: Normal/ Responsive Normal     Volume:  Normal    Mood:    Anxious  Normal   Affect:    Appropriate  Expansive    Thought Content:  Clear    Thought Form:  Coherent  Goal Directed  Logical    Insight:    Good  and Intellectual Insight     Medication Review:   No current psychiatric medications prescribed     Medication Compliance:   NA     Changes in Health Issues:   None reported     Chemical Use Review:   Substance Use: Chemical use reviewed, no active concerns identified      Tobacco Use: No current tobacco use.      Diagnosis:  1. Generalized anxiety disorder        Collateral Reports Completed:   Not Applicable    PLAN: (Patient Tasks / Therapist Tasks / Other)  Consider family boundaries, revisit Boni, use safe calm state        Kasia Denson, LICSW  9/16/2024                                                           ______________________________________________________________________

## 2024-09-16 NOTE — PROGRESS NOTES
GYN Progress Note     CC: Follow up for dysparunia and ovarian cyst     HPI: Jose Lizama is a 34 year old  who presents to clinic for follow up due to a history of dyspareunia and ovarian cyst. She was last seen for this concern in 2024. She reported new onset dyspareunia about 1.5 years ago despite the same partner and sexual practices. She typically describes it as a specific point on the anterior vaginal wall that is painful with penetration and remains painful throughout intercourse, she is also having some discomfort with inserting/removing tampons in this area. She was referred to pelvic floor PT and had a positive experience with those sessions but it didn't seem like there was true dysfunction or dystonia of the pelvic floor muscles. She denies increased vaginal or vulvar irritation. She does have some increased urinary frequency during the day time (usually voiding every hour) and nocturia x 1, denies dysuria or hematuria.     She also had sudden onset abdominal pain in 2024 that lasted for several weeks, ultrasound and subsequent MRI ordered by patient's PCP consistent with a resolving hemorrhagic cyst. Pain has resolved and patient denies further concerns but just wanted us to be aware.     O: /77 (BP Location: Right arm, Patient Position: Sitting, Cuff Size: Adult Regular)   Pulse 75   Temp 97.6  F (36.4  C)   Resp 16   Wt 64 kg (141 lb)   LMP 2024 (Exact Date)   BMI 26.45 kg/m      General: Patient alert and oriented, no acute distress  CV: no peripheral edema or cyanosis  Resp: normal respiratory effort and equal lung expansion  Abdomen: non-tender to light and deep palpation, no masses, organomegaly or hernia  : normal external genitalia without lesions. Vaginal mucosa and cervix appears normal. On palpation there is point tenderness at 1 o'clock about 1.5 cm inside the vaginal introitus, adjacent to the urethra. No urethral diverticulum noted on exam. non-tender.  Uterus mid position, no CMT. No adnexal masses or tenderness noted.   Ext: non-tender, no edema      Assessment and plan:   Jose Lizama is a 34 year old  who presents to clinic for follow up due to a history of the following concerns.   (R10.2) Pelvic pain in female  -Patient with point tenderness in the vaginal canal just adjacent to the urethra which correlates with her pain with intercourse for the past 1.5 years. No abnormalities in the overlying vaginal mucosa.   -We discussed that I don't have a great explanation for what would be causing this but given such focal tenderness and proximity to the urethra, I do think it would be a good idea for her to be evaluated by urogynecology to see if they have any other thoughts.   -If no etiology of the pain determined during urogyn referral, we discussed that we could try a trigger point injection but that this is an atypical area and usually this is done for pain related to the levator ani muscle group as opposed to periurethral.   Plan: Adult Urology  Referral            (N83.8) Ovarian mass  -Hemorrhagic cyst resolved on MRI, we discussed that now that she is off hormonal contraception, she may have more frequent ovarian cysts and if this recurs she could consider resuming the NuvaRing or OCPs but for now condoms are working well for her and she would prefer to stay off of contraception.       Dispo: RTC as needed     Radha Stock MD

## 2024-09-30 ENCOUNTER — VIRTUAL VISIT (OUTPATIENT)
Dept: PSYCHOLOGY | Facility: CLINIC | Age: 34
End: 2024-09-30
Payer: COMMERCIAL

## 2024-09-30 DIAGNOSIS — F41.1 GENERALIZED ANXIETY DISORDER: Primary | ICD-10-CM

## 2024-09-30 PROCEDURE — 90837 PSYTX W PT 60 MINUTES: CPT | Mod: 95

## 2024-09-30 NOTE — PROGRESS NOTES
[facility]                                     Progress Note    Patient Name: Jose Lizama  Date: 9/30/2024         Service Type: Individual      Session Start Time: 3:05 pm  Session End Time: 3:58 pm     Session Length: 53 min    Session #: 5    Attendees: Client attended alone    Service Modality:  Video Visit:      Provider verified identity through the following two step process.  Patient provided:  Patient is known previously to provider    Telemedicine Visit: The patient's condition can be safely assessed and treated via synchronous audio and visual telemedicine encounter.      Reason for Telemedicine Visit: Patient convenience (e.g. access to timely appointments / distance to available provider)    Originating Site (Patient Location): Patient's home    Distant Site (Provider Location): Provider Remote Setting- Home Office    Consent:  The patient/guardian has verbally consented to: the potential risks and benefits of telemedicine (video visit) versus in person care; bill my insurance or make self-payment for services provided; and responsibility for payment of non-covered services.     Patient would like the video invitation sent by:  My Chart    Mode of Communication:  [facility]    Distant Location (Provider):  Off-site    As the provider I attest to compliance with applicable laws and regulations related to telemedicine.    DATA  Interactive Complexity: No  Crisis: No        Progress Since Last Session (Related to Symptoms / Goals / Homework):   Symptoms: Improving situational mood    Homework: Did not complete      Episode of Care Goals: Minimal progress - CONTEMPLATION (Considering change and yet undecided); Intervened by assessing the negative and positive thinking (ambivalence) about behavior change     Current / Ongoing Stressors and Concerns:  Continued interest in understanding medical concerns (SOFÍA et al).  Primary relationship intimacy/connection,  family of origin relational struggles. MRI of ovaries clear.      Treatment Objective(s) Addressed in This Session:   identify 2-3 initial signs or symptoms of anxiety  Identify triggers for anxiety     Intervention:   Interpersonal Therapy: Patient provided a recap of the past  events/concerns. Provider offered active listening and validation. Surfaced relational concerns regarding intimacy, identified avenues to explore, including patterns of criticism, couples therapy options. Explored enlisting limits in sharing selectively/asking to have needs met in support   EMDR: reinforced safe calm state exercise.    Assessments completed prior to visit:  The following assessments were completed by patient for this visit:  PHQ2:       4/19/2024    12:40 PM 2/12/2024    10:06 AM 5/8/2014     3:12 PM 5/8/2014     3:06 PM 4/16/2014     2:55 PM 4/16/2014     2:06 PM 4/18/2012     8:57 AM   PHQ-2 ( 1999 Pfizer)   Q1: Little interest or pleasure in doing things 0 1 0 0 1 0 0   Q2: Feeling down, depressed or hopeless 0 1 0 0 1 0 0   PHQ-2 Score 0 2 0 0 2 0 0     PHQ9:       6/3/2024     8:22 AM 6/17/2024     8:26 AM 7/1/2024     8:27 AM 7/15/2024    10:25 AM 7/22/2024     9:49 AM 8/5/2024     7:43 AM 9/16/2024     3:51 PM   PHQ-9 SCORE   PHQ-9 Total Score MyChart 4 (Minimal depression) 3 (Minimal depression) 3 (Minimal depression) 5 (Mild depression) 3 (Minimal depression) 4 (Minimal depression) 3 (Minimal depression)   PHQ-9 Total Score 4 3 3 5 3 4 3     GAD2:       2/5/2024    12:15 PM 3/11/2024     2:29 PM 4/15/2024     8:23 AM 7/15/2024    10:25 AM 9/16/2024     3:52 PM   YAZMIN-2   Feeling nervous, anxious, or on edge 2 2 1 1 1   Not being able to stop or control worrying 2 1 1 0 1   YAZMIN-2 Total Score 4 3 2 1    1 2     GAD7:       2/12/2024    10:06 AM 3/11/2024     2:29 PM 5/20/2024     8:25 AM 6/3/2024     8:22 AM 6/17/2024     8:26 AM 7/1/2024     8:27 AM 8/5/2024     7:44 AM   YAZMIN-7 SCORE   Total Score  6 (mild anxiety) 7  (mild anxiety) 4 (minimal anxiety) 4 (minimal anxiety) 6 (mild anxiety) 4 (minimal anxiety)   Total Score 8 6 7 4 4 6 4     CAGE-AID:       2/5/2024    12:19 PM   CAGE-AID Total Score   Total Score    Total Score MyChart        Information is confidential and restricted. Go to Review Flowsheets to unlock data.     PROMIS 10-Global Health (only subscores and total score):       2/5/2024    12:17 PM 5/6/2024    12:24 PM 7/15/2024    10:25 AM   PROMIS-10 Scores Only   Global Mental Health Score   14    14   Global Physical Health Score   16    16   PROMIS TOTAL - SUBSCORES   30    30       Information is confidential and restricted. Go to Review Flowsheets to unlock data.    Multiple values from one day are sorted in reverse-chronological order     [place] Suicide Severity Rating Scale (Lifetime/Recent)      2/5/2024     1:00 PM   [place] Suicide Severity Rating (Lifetime/Recent)   Q1 Wish to be Dead (Lifetime) N   Q2 Non-Specific Active Suicidal Thoughts (Lifetime) N   Actual Attempt (Lifetime) N   Has subject engaged in non-suicidal self-injurious behavior? (Lifetime) N   Interrupted Attempts (Lifetime) N   Aborted or Self-Interrupted Attempt (Lifetime) N   Preparatory Acts or Behavior (Lifetime) N   Calculated C-SSRS Risk Score (Lifetime/Recent) No Risk Indicated         ASSESSMENT: Current Emotional / Mental Status (status of significant symptoms):   Risk status (Self / Other harm or suicidal ideation)   Patient denies current fears or concerns for personal safety.   Patient denies current or recent suicidal ideation or behaviors.   Patient denies current or recent homicidal ideation or behaviors.   Patient denies current or recent self injurious behavior or ideation.   Patient denies other safety concerns.   Patient reports there has been no change in risk factors since their last session.     Patient reports there has been no change in protective factors since their last session.     Recommended that patient  call 911 or go to the local ED should there be a change in any of these risk factors.     Appearance:   Appropriate    Eye Contact:   Good    Psychomotor Behavior: Normal    Attitude:   Cooperative  Pleasant   Orientation:   All   Speech    Rate / Production: Normal/ Responsive Normal     Volume:  Normal    Mood:    Anxious  Normal   Affect:    Appropriate  Expansive    Thought Content:  Clear    Thought Form:  Coherent  Goal Directed  Logical    Insight:    Good  and Intellectual Insight     Medication Review:   No current psychiatric medications prescribed     Medication Compliance:   NA     Changes in Health Issues:   None reported     Chemical Use Review:   Substance Use: Chemical use reviewed, no active concerns identified      Tobacco Use: No current tobacco use.      Diagnosis:  1. Generalized anxiety disorder        Collateral Reports Completed:   Not Applicable    PLAN: (Patient Tasks / Therapist Tasks / Other)  Consider family boundaries, revisit AlAnon, use safe calm state, sharing limits, consider couples therapy        Kasia Denson, [facility]  9/30/2024                                                           ______________________________________________________________________

## 2024-10-16 NOTE — TELEPHONE ENCOUNTER
MEDICAL RECORDS REQUEST   [facility]  Urology Clinic  [address] [place] [address]  PHONE: [phone]  Fax: [phone]        FUTURE VISIT INFORMATION                                                   Jose Lizama, : 1990 scheduled for future visit at [facility]    APPOINTMENT INFORMATION:  Date: 10/17/2024  Provider:  Rafa Hurst MD  Reason for Visit/Diagnosis: Pelvic pain in female    REFERRAL INFORMATION:  Referring provider:  Radha Stock MD in [facility]      RECORDS REQUESTED FOR VISIT                                                     NOTES  STATUS/DETAILS   OFFICE NOTE from referring provider  yes 2024 -- Radha Stock MD in [facility]   OFFICE NOTE from other specialist  yes   MEDICATION LIST  yes   LABS     URINALYSIS (UA)  yes   IMAGES  YES, 2024 -- MR PELVIS  2024 -- US PELVIS     PRE-VISIT CHECKLIST      Joint diagnostic appointment coordinated correctly          (ensure right order & amount of time) Yes   RECORD COLLECTION COMPLETE Yes

## 2024-10-17 ENCOUNTER — PRE VISIT (OUTPATIENT)
Dept: UROLOGY | Facility: CLINIC | Age: 34
End: 2024-10-17

## 2024-10-21 ENCOUNTER — VIRTUAL VISIT (OUTPATIENT)
Dept: FAMILY MEDICINE | Facility: CLINIC | Age: 34
End: 2024-10-21
Payer: COMMERCIAL

## 2024-10-21 ENCOUNTER — VIRTUAL VISIT (OUTPATIENT)
Dept: PSYCHOLOGY | Facility: CLINIC | Age: 34
End: 2024-10-21
Payer: COMMERCIAL

## 2024-10-21 DIAGNOSIS — F41.9 ANXIETY: Primary | ICD-10-CM

## 2024-10-21 DIAGNOSIS — F41.1 GENERALIZED ANXIETY DISORDER: Primary | ICD-10-CM

## 2024-10-21 PROCEDURE — 90837 PSYTX W PT 60 MINUTES: CPT | Mod: 95

## 2024-10-21 PROCEDURE — 99214 OFFICE O/P EST MOD 30 MIN: CPT | Mod: 95 | Performed by: PHYSICIAN ASSISTANT

## 2024-10-21 RX ORDER — FLUOXETINE 10 MG/1
10 TABLET, FILM COATED ORAL DAILY
Qty: 60 TABLET | Refills: 0 | Status: SHIPPED | OUTPATIENT
Start: 2024-10-21

## 2024-10-21 ASSESSMENT — ANXIETY QUESTIONNAIRES
1. FEELING NERVOUS, ANXIOUS, OR ON EDGE: MORE THAN HALF THE DAYS
3. WORRYING TOO MUCH ABOUT DIFFERENT THINGS: SEVERAL DAYS
8. IF YOU CHECKED OFF ANY PROBLEMS, HOW DIFFICULT HAVE THESE MADE IT FOR YOU TO DO YOUR WORK, TAKE CARE OF THINGS AT HOME, OR GET ALONG WITH OTHER PEOPLE?: VERY DIFFICULT
4. TROUBLE RELAXING: SEVERAL DAYS
GAD7 TOTAL SCORE: 9
6. BECOMING EASILY ANNOYED OR IRRITABLE: NEARLY EVERY DAY
GAD7 TOTAL SCORE: 9
IF YOU CHECKED OFF ANY PROBLEMS ON THIS QUESTIONNAIRE, HOW DIFFICULT HAVE THESE PROBLEMS MADE IT FOR YOU TO DO YOUR WORK, TAKE CARE OF THINGS AT HOME, OR GET ALONG WITH OTHER PEOPLE: VERY DIFFICULT
5. BEING SO RESTLESS THAT IT IS HARD TO SIT STILL: NOT AT ALL
2. NOT BEING ABLE TO STOP OR CONTROL WORRYING: SEVERAL DAYS
GAD7 TOTAL SCORE: 9
7. FEELING AFRAID AS IF SOMETHING AWFUL MIGHT HAPPEN: SEVERAL DAYS
7. FEELING AFRAID AS IF SOMETHING AWFUL MIGHT HAPPEN: SEVERAL DAYS

## 2024-10-21 ASSESSMENT — PATIENT HEALTH QUESTIONNAIRE - PHQ9
10. IF YOU CHECKED OFF ANY PROBLEMS, HOW DIFFICULT HAVE THESE PROBLEMS MADE IT FOR YOU TO DO YOUR WORK, TAKE CARE OF THINGS AT HOME, OR GET ALONG WITH OTHER PEOPLE: SOMEWHAT DIFFICULT
SUM OF ALL RESPONSES TO PHQ QUESTIONS 1-9: 5
10. IF YOU CHECKED OFF ANY PROBLEMS, HOW DIFFICULT HAVE THESE PROBLEMS MADE IT FOR YOU TO DO YOUR WORK, TAKE CARE OF THINGS AT HOME, OR GET ALONG WITH OTHER PEOPLE: SOMEWHAT DIFFICULT
SUM OF ALL RESPONSES TO PHQ QUESTIONS 1-9: 10
SUM OF ALL RESPONSES TO PHQ QUESTIONS 1-9: 10
SUM OF ALL RESPONSES TO PHQ QUESTIONS 1-9: 5

## 2024-10-21 ASSESSMENT — ENCOUNTER SYMPTOMS: NERVOUS/ANXIOUS: 1

## 2024-10-21 NOTE — PROGRESS NOTES
Jose is a 34 year old who is being evaluated via a billable video visit.    How would you like to obtain your AVS? MyChart  If the video visit is dropped, the invitation should be resent by: Text to cell phone: [phone]  Will anyone else be joining your video visit? No      Assessment & Plan     Anxiety  Chronic worsening anxiety. Shared decision making was done to start on mediation. We discussed side effects of medication. It can take up to 4 weeks to feel effects. Advised follow up in 4-6 weeks to recheck on medication. Patient agree's with this plan and has no further questions  - FLUoxetine (PROZAC) 10 MG tablet; Take 1 tablet (10 mg) by mouth daily.                Subjective   Jose is a 34 year old, presenting for the following health issues:  Anxiety (Would like to discuss starting medication )        10/21/2024     4:25 PM   Additional Questions   Roomed by Tsering   Accompanied by none         10/21/2024     4:25 PM   Patient Reported Additional Medications   Patient reports taking the following new medications none     Anxiety    History of Present Illness       Reason for visit:  Medication for depression/anxiety She is missing 2 dose(s) of medications per week.  She is not taking prescribed medications regularly due to remembering to take.       Depression and Anxiety   How are you doing with your depression since your last visit? Worsened   How are you doing with your anxiety since your last visit?  Worsened   Are you having other symptoms that might be associated with depression or anxiety? Yes:     Additional provider notes :   Has been having worsening and more intrusive thoughts. She feels like she can't control her thoughts. Her thoughts go really fast. She is more prone to Disastrous thoughts. She isn't sleeping well. She is overwhelmed. She has tried Wellbutrin in the best and it helped more with depression but not anxiety. Has tried citalopram but was difficult to remember to take every  day and got brain zaps with that. She also is seeing a therapist . She denies panic attacks but does sometimes cry due to feeling overwhelmed.           10/21/2024     4:29 PM   Last PHQ-9   1.  Little interest or pleasure in doing things 0   2.  Feeling down, depressed, or hopeless 2   3.  Trouble falling or staying asleep, or sleeping too much 2   4.  Feeling tired or having little energy 2   5.  Poor appetite or overeating 0   6.  Feeling bad about yourself 2   7.  Trouble concentrating 2   8.  Moving slowly or restless 0   Q9: Thoughts of better off dead/self-harm past 2 weeks 0   PHQ-9 Total Score 10         10/21/2024     2:54 PM   YAZMIN-7    1. Feeling nervous, anxious, or on edge 2   2. Not being able to stop or control worrying 1   3. Worrying too much about different things 1   4. Trouble relaxing 1   5. Being so restless that it is hard to sit still 0   6. Becoming easily annoyed or irritable 3   7. Feeling afraid, as if something awful might happen 1   YAZMIN-7 Total Score 9   If you checked any problems, how difficult have they made it for you to do your work, take care of things at home, or get along with other people? Very difficult           Review of Systems  Constitutional, HEENT, cardiovascular, pulmonary, gi and gu systems are negative, except as otherwise noted.      Objective           Vitals:  No vitals were obtained today due to virtual visit.    Physical Exam   GENERAL: alert and no distress  EYES: Eyes grossly normal to inspection.  No discharge or erythema, or obvious scleral/conjunctival abnormalities.  RESP: No audible wheeze, cough, or visible cyanosis.    SKIN: Visible skin clear. No significant rash, abnormal pigmentation or lesions.  NEURO: Cranial nerves grossly intact.  Mentation and speech appropriate for age.  PSYCH: Appropriate affect, tone, and pace of words          Video-Visit Details    Type of service:  Video Visit   Originating Location (pt. Location): Home    Distant Location  (provider location):  Off-site  Platform used for Video Visit: Faviola  Signed Electronically by: JEFFERY Mtz

## 2024-10-21 NOTE — PROGRESS NOTES
[facility]                                     Progress Note    Patient Name: Jose Lizama  Date: 10/21/2024         Service Type: Individual      Session Start Time: 3:02 pm  Session End Time: 3:55 pm     Session Length: 53 min    Session #: 6    Attendees: Client attended alone    Service Modality:  Video Visit:      Provider verified identity through the following two step process.  Patient provided:  Patient is known previously to provider    Telemedicine Visit: The patient's condition can be safely assessed and treated via synchronous audio and visual telemedicine encounter.      Reason for Telemedicine Visit: Patient convenience (e.g. access to timely appointments / distance to available provider)    Originating Site (Patient Location): Patient's home    Distant Site (Provider Location): Provider Remote Setting- Home Office    Consent:  The patient/guardian has verbally consented to: the potential risks and benefits of telemedicine (video visit) versus in person care; bill my insurance or make self-payment for services provided; and responsibility for payment of non-covered services.     Patient would like the video invitation sent by:  My Chart    Mode of Communication:  Video Conference via [facility]    Distant Location (Provider):  Off-site    As the provider I attest to compliance with applicable laws and regulations related to telemedicine.    DATA  Interactive Complexity: No  Crisis: No        Progress Since Last Session (Related to Symptoms / Goals / Homework):   Symptoms: Improving situational mood    Homework: Did not complete      Episode of Care Goals: Minimal progress - CONTEMPLATION (Considering change and yet undecided); Intervened by assessing the negative and positive thinking (ambivalence) about behavior change     Current / Ongoing Stressors and Concerns:  Continued interest in understanding medical concerns (SOFÍA et al).  Primary relationship intimacy/connection,  family of origin relational struggles. MRI of ovaries clear.      Treatment Objective(s) Addressed in This Session:   identify 2-3 initial signs or symptoms of anxiety  Identify triggers for anxiety     Intervention:   Interpersonal Therapy: Patient provided a recap of the past  events/concerns. Provider offered active listening and validation. Reviewed  relational concerns, conceptualized approach to regulating interactions in relationship, with aim of reducing negative/corrective communication. Processed family of origin, professional like, partners lived experience impacts on current relational functioning    EMDR: reinforced safe calm state exercise.    Assessments completed prior to visit:  The following assessments were completed by patient for this visit:  PHQ2:       4/19/2024    12:40 PM 2/12/2024    10:06 AM 5/8/2014     3:12 PM 5/8/2014     3:06 PM 4/16/2014     2:55 PM 4/16/2014     2:06 PM 4/18/2012     8:57 AM   PHQ-2 ( 1999 [employer])   Q1: Little interest or pleasure in doing things 0 1 0 0 1 0 0   Q2: Feeling down, depressed or hopeless 0 1 0 0 1 0 0   PHQ-2 Score 0 2 0 0 2 0 0     PHQ9:       6/17/2024     8:26 AM 7/1/2024     8:27 AM 7/15/2024    10:25 AM 7/22/2024     9:49 AM 8/5/2024     7:43 AM 9/16/2024     3:51 PM 10/21/2024     2:53 PM   PHQ-9 SCORE   PHQ-9 Total Score MyChart 3 (Minimal depression) 3 (Minimal depression) 5 (Mild depression) 3 (Minimal depression) 4 (Minimal depression) 3 (Minimal depression) 5 (Mild depression)   PHQ-9 Total Score 3 3 5 3 4 3 5     GAD2:       2/5/2024    12:15 PM 3/11/2024     2:29 PM 4/15/2024     8:23 AM 7/15/2024    10:25 AM 9/16/2024     3:52 PM 10/21/2024     2:54 PM   YAZMIN-2   Feeling nervous, anxious, or on edge 2 2 1 1 1 2   Not being able to stop or control worrying 2 1 1 0 1 1   YAZMIN-2 Total Score 4 3 2 1    1 2 3     GAD7:       3/11/2024     2:29 PM 5/20/2024     8:25 AM 6/3/2024     8:22 AM 6/17/2024     8:26 AM 7/1/2024     8:27 AM 8/5/2024      7:44 AM 10/21/2024     2:54 PM   YAZMIN-7 SCORE   Total Score 6 (mild anxiety) 7 (mild anxiety) 4 (minimal anxiety) 4 (minimal anxiety) 6 (mild anxiety) 4 (minimal anxiety) 9 (mild anxiety)   Total Score 6 7 4 4 6 4 9     CAGE-AID:       2/5/2024    12:19 PM   CAGE-AID Total Score   Total Score    Total Score MyChart        Information is confidential and restricted. Go to Review Flowsheets to unlock data.     PROMIS 10-Global Health (only subscores and total score):       2/5/2024    12:17 PM 5/6/2024    12:24 PM 7/15/2024    10:25 AM 10/21/2024     2:55 PM   PROMIS-10 Scores Only   Global Mental Health Score   14    14 11   Global Physical Health Score   16    16 15   PROMIS TOTAL - SUBSCORES   30    30 26       Information is confidential and restricted. Go to Review Flowsheets to unlock data.    Multiple values from one day are sorted in reverse-chronological order     [place] Suicide Severity Rating Scale (Lifetime/Recent)      2/5/2024     1:00 PM   [place] Suicide Severity Rating (Lifetime/Recent)   Q1 Wish to be Dead (Lifetime) N   Q2 Non-Specific Active Suicidal Thoughts (Lifetime) N   Actual Attempt (Lifetime) N   Has subject engaged in non-suicidal self-injurious behavior? (Lifetime) N   Interrupted Attempts (Lifetime) N   Aborted or Self-Interrupted Attempt (Lifetime) N   Preparatory Acts or Behavior (Lifetime) N   Calculated C-SSRS Risk Score (Lifetime/Recent) No Risk Indicated         ASSESSMENT: Current Emotional / Mental Status (status of significant symptoms):   Risk status (Self / Other harm or suicidal ideation)   Patient denies current fears or concerns for personal safety.   Patient denies current or recent suicidal ideation or behaviors.   Patient denies current or recent homicidal ideation or behaviors.   Patient denies current or recent self injurious behavior or ideation.   Patient denies other safety concerns.   Patient reports there has been no change in risk factors since their last  session.     Patient reports there has been no change in protective factors since their last session.     Recommended that patient call 911 or go to the local ED should there be a change in any of these risk factors.     Appearance:   Appropriate    Eye Contact:   Good    Psychomotor Behavior: Normal    Attitude:   Cooperative  Pleasant   Orientation:   All   Speech    Rate / Production: Normal/ Responsive Normal     Volume:  Normal    Mood:    Anxious  Normal   Affect:    Appropriate  Expansive    Thought Content:  Clear    Thought Form:  Coherent  Goal Directed  Logical    Insight:    Good  and Intellectual Insight     Medication Review:   No current psychiatric medications prescribed     Medication Compliance:   NA     Changes in Health Issues:   None reported     Chemical Use Review:   Substance Use: Chemical use reviewed, no active concerns identified      Tobacco Use: No current tobacco use.      Diagnosis:  1. Generalized anxiety disorder        Collateral Reports Completed:   Not Applicable    PLAN: (Patient Tasks / Therapist Tasks / Other)  Consider family boundaries, use safe calm state, sharing limits, couples therapy, enact negative input/directive communication regulation    Kasia Denson, LICSW  10/21/2024                                                           ______________________________________________________________________

## 2024-11-04 ENCOUNTER — VIRTUAL VISIT (OUTPATIENT)
Dept: PSYCHOLOGY | Facility: CLINIC | Age: 34
End: 2024-11-04
Payer: COMMERCIAL

## 2024-11-04 DIAGNOSIS — F41.1 GENERALIZED ANXIETY DISORDER: Primary | ICD-10-CM

## 2024-11-04 PROCEDURE — 90837 PSYTX W PT 60 MINUTES: CPT | Mod: 95

## 2024-11-04 ASSESSMENT — PATIENT HEALTH QUESTIONNAIRE - PHQ9
SUM OF ALL RESPONSES TO PHQ QUESTIONS 1-9: 4
10. IF YOU CHECKED OFF ANY PROBLEMS, HOW DIFFICULT HAVE THESE PROBLEMS MADE IT FOR YOU TO DO YOUR WORK, TAKE CARE OF THINGS AT HOME, OR GET ALONG WITH OTHER PEOPLE: SOMEWHAT DIFFICULT
SUM OF ALL RESPONSES TO PHQ QUESTIONS 1-9: 4

## 2024-11-04 NOTE — PROGRESS NOTES
[facility]                                     Progress Note    Patient Name: Jose Lizama  Date: 11/4/2024         Service Type: Individual      Session Start Time: 1:01 pm  Session End Time: 1:54 pm     Session Length: 53 min    Session #: 7    Attendees: Client attended alone    Service Modality:  Video Visit:      Provider verified identity through the following two step process.  Patient provided:  Patient is known previously to provider    Telemedicine Visit: The patient's condition can be safely assessed and treated via synchronous audio and visual telemedicine encounter.      Reason for Telemedicine Visit: Patient convenience (e.g. access to timely appointments / distance to available provider)    Originating Site (Patient Location): Patient's home    Distant Site (Provider Location): Provider Remote Setting- Home Office    Consent:  The patient/guardian has verbally consented to: the potential risks and benefits of telemedicine (video visit) versus in person care; bill my insurance or make self-payment for services provided; and responsibility for payment of non-covered services.     Patient would like the video invitation sent by:  My Chart    Mode of Communication:  Video Conference via [facility]    Distant Location (Provider):  Off-site    As the provider I attest to compliance with applicable laws and regulations related to telemedicine.    DATA  Interactive Complexity: No  Crisis: No        Progress Since Last Session (Related to Symptoms / Goals / Homework):   Symptoms: Improving situational mood    Homework: Did not complete      Episode of Care Goals: Minimal progress - CONTEMPLATION (Considering change and yet undecided); Intervened by assessing the negative and positive thinking (ambivalence) about behavior change     Current / Ongoing Stressors and Concerns:  Continued interest in understanding medical concerns (SOFÍA et al).  Primary relationship intimacy/connection,  family of origin relational struggles. MRI of ovaries clear.      Treatment Objective(s) Addressed in This Session:   identify 2-3 initial signs or symptoms of anxiety  Identify triggers for anxiety     Intervention:   Interpersonal Therapy: Patient provided a recap of the past  events/concerns. Provider offered active listening and validation. Reviewed  relational concerns, conceptualized approach to regulating interactions in relationship, with aim of reducing negative/corrective communication. Processed discomfort regarding partner social behavior in group dynamics causing embarassment   EMDR: reinforced safe calm state exercise.    Assessments completed prior to visit:  The following assessments were completed by patient for this visit:  PHQ2:       4/19/2024    12:40 PM 2/12/2024    10:06 AM 5/8/2014     3:12 PM 5/8/2014     3:06 PM 4/16/2014     2:55 PM 4/16/2014     2:06 PM 4/18/2012     8:57 AM   PHQ-2 ( 1999 Pf[employer])   Q1: Little interest or pleasure in doing things 0 1 0 0 1 0 0   Q2: Feeling down, depressed or hopeless 0 1 0 0 1 0 0   PHQ-2 Score 0 2 0 0 2 0 0     PHQ9:       7/15/2024    10:25 AM 7/22/2024     9:49 AM 8/5/2024     7:43 AM 9/16/2024     3:51 PM 10/21/2024     2:53 PM 10/21/2024     4:29 PM 11/4/2024    12:56 PM   PHQ-9 SCORE   PHQ-9 Total Score MyChart 5 (Mild depression) 3 (Minimal depression) 4 (Minimal depression) 3 (Minimal depression) 5 (Mild depression) 10 (Moderate depression) 4 (Minimal depression)   PHQ-9 Total Score 5 3 4 3 5 10 4        Patient-reported     GAD2:       2/5/2024    12:15 PM 3/11/2024     2:29 PM 4/15/2024     8:23 AM 7/15/2024    10:25 AM 9/16/2024     3:52 PM 10/21/2024     2:54 PM 11/4/2024    12:56 PM   YAZMIN-2   Feeling nervous, anxious, or on edge 2  2  1  1  1  2  1    Not being able to stop or control worrying 2  1  1  0  1  1  1    YAZMIN-2 Total Score 4 3 2 1    1 2 3 2        Patient-reported    Multiple values from one day are sorted in  reverse-chronological order     GAD7:       3/11/2024     2:29 PM 5/20/2024     8:25 AM 6/3/2024     8:22 AM 6/17/2024     8:26 AM 7/1/2024     8:27 AM 8/5/2024     7:44 AM 10/21/2024     2:54 PM   YAZMIN-7 SCORE   Total Score 6 (mild anxiety) 7 (mild anxiety) 4 (minimal anxiety) 4 (minimal anxiety) 6 (mild anxiety) 4 (minimal anxiety) 9 (mild anxiety)   Total Score 6 7 4 4 6 4 9     CAGE-AID:       2/5/2024    12:19 PM   CAGE-AID Total Score   Total Score    Total Score MyChart        Information is confidential and restricted. Go to Review Flowsheets to unlock data.     PROMIS 10-Global Health (only subscores and total score):       2/5/2024    12:17 PM 5/6/2024    12:24 PM 7/15/2024    10:25 AM 10/21/2024     2:55 PM 11/4/2024    12:57 PM   PROMIS-10 Scores Only   Global Mental Health Score   14    14 11 12    Global Physical Health Score   16    16 15 16    PROMIS TOTAL - SUBSCORES   30    30 26 28        Information is confidential and restricted. Go to Review Flowsheets to unlock data.    Patient-reported    Multiple values from one day are sorted in reverse-chronological order     [place] Suicide Severity Rating Scale (Lifetime/Recent)      2/5/2024     1:00 PM   [place] Suicide Severity Rating (Lifetime/Recent)   Q1 Wish to be Dead (Lifetime) N   Q2 Non-Specific Active Suicidal Thoughts (Lifetime) N   Actual Attempt (Lifetime) N   Has subject engaged in non-suicidal self-injurious behavior? (Lifetime) N   Interrupted Attempts (Lifetime) N   Aborted or Self-Interrupted Attempt (Lifetime) N   Preparatory Acts or Behavior (Lifetime) N   Calculated C-SSRS Risk Score (Lifetime/Recent) No Risk Indicated         ASSESSMENT: Current Emotional / Mental Status (status of significant symptoms):   Risk status (Self / Other harm or suicidal ideation)   Patient denies current fears or concerns for personal safety.   Patient denies current or recent suicidal ideation or behaviors.   Patient denies current or recent  homicidal ideation or behaviors.   Patient denies current or recent self injurious behavior or ideation.   Patient denies other safety concerns.   Patient reports there has been no change in risk factors since their last session.     Patient reports there has been no change in protective factors since their last session.     Recommended that patient call 911 or go to the local ED should there be a change in any of these risk factors.     Appearance:   Appropriate    Eye Contact:   Good    Psychomotor Behavior: Normal    Attitude:   Cooperative  Pleasant   Orientation:   All   Speech    Rate / Production: Normal/ Responsive Normal     Volume:  Normal    Mood:    Anxious  Normal   Affect:    Appropriate  Expansive    Thought Content:  Clear    Thought Form:  Coherent  Goal Directed  Logical    Insight:    Good  and Intellectual Insight     Medication Review:   No current psychiatric medications prescribed     Medication Compliance:   NA     Changes in Health Issues:   None reported     Chemical Use Review:   Substance Use: Chemical use reviewed, no active concerns identified      Tobacco Use: No current tobacco use.      Diagnosis:  1. Generalized anxiety disorder        Collateral Reports Completed:   Not Applicable    PLAN: (Patient Tasks / Therapist Tasks / Other) Use family boundaries, safe calm state, sharing limits, couples therapy, enact negative input/directive communication regulation    Kasia Denson, [facility]  11/4/2024    ______________________________________________________________________                                              Individual Treatment Plan    Patient's Name: Jose Lizama  YOB: 1990    Date of Creation: 9/30/24  Date Treatment Plan Last Reviewed/Revised:     DSM5 Diagnoses: 300.02 (F41.1) Generalized Anxiety Disorder  Psychosocial / Contextual Factors: family of origin dysfunction  PROMIS (reviewed every 90 days):  10/21/24 26,     28  11/4/24    Referral /  Collaboration:  Referral to another professional/service is not indicated at this time..    Anticipated number of session for this episode of care: 9-12 sessions  Anticipation frequency of session: Every other week-every 3 weeks  Anticipated Duration of each session: 53 or more minutes  Treatment plan will be reviewed in 90 days or when goals have been changed.     MeasurableTreatment Goal(s) related to diagnosis / functional impairment(s)  Goal 1: Patient will understand and address anxiety as related to situational stressors and relational dynamics utilizing skills    I will know I've met my goal when tbc.      Objective #A (Patient Action)    Patient will identify 2-4 fears / thoughts that contribute to feeling anxious.  Patient will learn and practice skills to manage anxiety  identify 2-3 initial signs or symptoms of anxiety  Identify triggers for anxiety  Status: New - Date: 9/24      Intervention(s)  Therapist will teach  skills and assign homework .    Objective #B  Patient will practice deep breathing at least 2x a day.  Status: New - Date: 9/24      Intervention(s)  Therapist will  teach breathing and grounding .    Objective #C  Patient will  learn and practice relational communication skills .  Status: New - Date: 9/30/24      Intervention(s)  Therapist will  teach skills and assign homework .    Patient has reviewed and agreed to the above plan.      REZA Swift  9/30/2024

## 2025-02-04 ENCOUNTER — TELEPHONE (OUTPATIENT)
Dept: UROLOGY | Facility: CLINIC | Age: 35
End: 2025-02-04
Payer: COMMERCIAL

## 2025-02-04 NOTE — TELEPHONE ENCOUNTER
Left Voicemail (1st Attempt) for the patient to call back and schedule the following:    Appointment type: NEW  Provider: Jennifer  Return date: 3/14 reschedule  Specialty phone number: [phone]  Additional appointment(s) needed: NA  Additonal Notes: NA

## 2025-06-02 ENCOUNTER — VIRTUAL VISIT (OUTPATIENT)
Dept: FAMILY MEDICINE | Facility: CLINIC | Age: 35
End: 2025-06-02
Payer: COMMERCIAL

## 2025-06-02 DIAGNOSIS — F90.9 ATTENTION DEFICIT HYPERACTIVITY DISORDER (ADHD), UNSPECIFIED ADHD TYPE: ICD-10-CM

## 2025-06-02 DIAGNOSIS — B96.89 BACTERIAL SINUSITIS: Primary | ICD-10-CM

## 2025-06-02 DIAGNOSIS — J32.9 BACTERIAL SINUSITIS: Primary | ICD-10-CM

## 2025-06-02 PROCEDURE — 98005 SYNCH AUDIO-VIDEO EST LOW 20: CPT

## 2025-06-02 RX ORDER — DOXYCYCLINE 100 MG/1
100 CAPSULE ORAL 2 TIMES DAILY
Qty: 14 CAPSULE | Refills: 0 | Status: SHIPPED | OUTPATIENT
Start: 2025-06-02 | End: 2025-06-09

## 2025-06-02 RX ORDER — BUPROPION HYDROCHLORIDE 300 MG/1
300 TABLET ORAL EVERY MORNING
Qty: 90 TABLET | Refills: 0 | Status: SHIPPED | OUTPATIENT
Start: 2025-06-02

## 2025-06-02 RX ORDER — BUPROPION HYDROCHLORIDE 300 MG/1
300 TABLET ORAL EVERY MORNING
COMMUNITY
Start: 2025-02-28 | End: 2025-06-02

## 2025-06-02 ASSESSMENT — PATIENT HEALTH QUESTIONNAIRE - PHQ9
SUM OF ALL RESPONSES TO PHQ QUESTIONS 1-9: 1
SUM OF ALL RESPONSES TO PHQ QUESTIONS 1-9: 1
10. IF YOU CHECKED OFF ANY PROBLEMS, HOW DIFFICULT HAVE THESE PROBLEMS MADE IT FOR YOU TO DO YOUR WORK, TAKE CARE OF THINGS AT HOME, OR GET ALONG WITH OTHER PEOPLE: NOT DIFFICULT AT ALL

## 2025-06-02 NOTE — PATIENT INSTRUCTIONS
Try fluticasone (flonase) daily for 7-14 days.   Try nasal irrigation    Flight medication:  - Flonase  - Sudafed - 60 minutes before flight, 1 hour before you land.   - 800 MG ibuprofen 30 minutes before flight, repeat before landing.  - 1000 MG of tylenol 60 minutes before light, repeat before landing.    Continue bupropion

## 2025-06-02 NOTE — PROGRESS NOTES
"Jose is a 35 year old who is being evaluated via a billable video visit.    How would you like to obtain your AVS? MyChart  If the video visit is dropped, the invitation should be resent by: Text to cell phone: [phone]  Will anyone else be joining your video visit? No      Assessment & Plan     Bacterial sinusitis  Advised watch/wait and if symptoms not improving in next few days start antibiotic.   - doxycycline hyclate (VIBRAMYCIN) 100 MG capsule  Dispense: 14 capsule; Refill: 0    Attention deficit hyperactivity disorder (ADHD), unspecified ADHD type  Chronic, needs temp refill. Advised scheduling in person annual exam and then can set up 1 year of refills prior to her moving.   - buPROPion (WELLBUTRIN XL) 300 MG 24 hr tablet  Dispense: 90 tablet; Refill: 0      Try fluticasone (flonase) daily for 7-14 days.   Try nasal irrigation    Flight medication:  - Flonase  - Sudafed - 60 minutes before flight, 1 hour before you land.   - 800 MG ibuprofen 30 minutes before flight, repeat before landing.  - 1000 MG of tylenol 60 minutes before light, repeat before landing.    Continue bupropion       BMI  Estimated body mass index is 26.45 kg/m  as calculated from the following:    Height as of 8/5/24: 1.555 m (5' 1.22\").    Weight as of 9/16/24: 64 kg (141 lb).         Follow-up       Subjective   Jose is a 35 year old, presenting for the following health issues:  No chief complaint on file.    History of Present Illness       Reason for visit:  Barby had some sort of upper respiratory infection since 5/21 and after a flight on 5/26 my right ear has been plugged up/muffled. Can still hear but it feels plugged. Pita coughing and having congestion, was negative for covid  Symptom onset:  1-2 weeks ago  Symptoms include:  Barby had some sort of upper respiratory infection since 5/21 and after a flight on 5/26 my right ear has been plugged up/muffled. Can still hear but it feels plugged. Pita coughing and having " congestion, was negative for covid  Symptom intensity:  Moderate  Symptom progression:  Staying the same  Had these symptoms before:  No  What makes it better:  Sometimes sudafed or dayquil helps dry up the mucous a little but doesnt clear my right ear She is missing 1 dose(s) of medications per week.  She is not taking prescribed medications regularly due to remembering to take.        Tried nasal saline.   No sinus pressure, but having significant runny nose. Still coughing up gunk.     No history of seasonal allergies.     Joined the call at 6/2/2025, 5:21:58 pm.  Left the call at 6/2/2025, 5:39:53 pm.  You were on the call for 17 minutes 55 seconds            Review of Systems  Constitutional, HEENT, cardiovascular, pulmonary, gi and gu systems are negative, except as otherwise noted.      Objective           Vitals:  No vitals were obtained today due to virtual visit.    Physical Exam   GENERAL: alert and no distress  EYES: Eyes grossly normal to inspection.  No discharge or erythema, or obvious scleral/conjunctival abnormalities.  RESP: No audible wheeze, cough, or visible cyanosis.    SKIN: Visible skin clear. No significant rash, abnormal pigmentation or lesions.  NEURO: Cranial nerves grossly intact.  Mentation and speech appropriate for age.  PSYCH: Appropriate affect, tone, and pace of words          Video-Visit Details    Type of service:  Video Visit   Originating Location (pt. Location): Home    Distant Location (provider location):  Off-site  Platform used for Video Visit: [employer]  Signed Electronically by: JING Silva CNP

## 2025-06-15 ENCOUNTER — HEALTH MAINTENANCE LETTER (OUTPATIENT)
Age: 35
End: 2025-06-15

## 2025-06-17 ENCOUNTER — OFFICE VISIT (OUTPATIENT)
Dept: UROLOGY | Facility: CLINIC | Age: 35
End: 2025-06-17
Attending: OBSTETRICS & GYNECOLOGY
Payer: COMMERCIAL

## 2025-06-17 VITALS
DIASTOLIC BLOOD PRESSURE: 69 MMHG | HEART RATE: 62 BPM | HEIGHT: 61 IN | BODY MASS INDEX: 22.71 KG/M2 | WEIGHT: 120.3 LBS | SYSTOLIC BLOOD PRESSURE: 105 MMHG

## 2025-06-17 DIAGNOSIS — N94.819 VULVODYNIA: Primary | ICD-10-CM

## 2025-06-17 DIAGNOSIS — R10.2 PELVIC PAIN IN FEMALE: ICD-10-CM

## 2025-06-17 PROCEDURE — 3074F SYST BP LT 130 MM HG: CPT | Performed by: OBSTETRICS & GYNECOLOGY

## 2025-06-17 PROCEDURE — 3078F DIAST BP <80 MM HG: CPT | Performed by: OBSTETRICS & GYNECOLOGY

## 2025-06-17 PROCEDURE — 99213 OFFICE O/P EST LOW 20 MIN: CPT | Performed by: OBSTETRICS & GYNECOLOGY

## 2025-06-17 PROCEDURE — 99212 OFFICE O/P EST SF 10 MIN: CPT | Performed by: OBSTETRICS & GYNECOLOGY

## 2025-06-17 RX ORDER — LIDOCAINE HYDROCHLORIDE 40 MG/ML
SOLUTION TOPICAL PRN
Qty: 50 ML | Refills: 1 | Status: SHIPPED | OUTPATIENT
Start: 2025-06-17

## 2025-06-17 RX ORDER — ESTRADIOL 0.1 MG/G
1 CREAM VAGINAL
Qty: 42.5 G | Refills: 3 | Status: SHIPPED | OUTPATIENT
Start: 2025-06-18

## 2025-06-17 RX ORDER — ETONOGESTREL AND ETHINYL ESTRADIOL VAGINAL RING .015; .12 MG/D; MG/D
1 RING VAGINAL
COMMUNITY

## 2025-06-17 RX ORDER — ETONOGESTREL AND ETHINYL ESTRADIOL VAGINAL RING .015; .12 MG/D; MG/D
RING VAGINAL
Qty: 1 EACH | Refills: 0 | Status: SHIPPED | OUTPATIENT
Start: 2025-06-17
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
## 2025-06-17 NOTE — PATIENT INSTRUCTIONS
Thank you for trusting us with your care!   Please be aware, if you are on Mychart, you may see your results prior to your providers review. If labs are abnormal, we will call or message you on [facility] with a follow up plan.    If you need to contact us for questions about:  Symptoms, Scheduling & Medical Questions; Non-urgent (2-3 day response) [facility] message, Urgent (needing response today) [phone] (if after 3:30pm next day response)   Prescriptions: Please call your Pharmacy   Billing: [facility] [phone] or [facility]:[phone]
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
## 2025-07-14 DIAGNOSIS — R10.2 PELVIC PAIN IN FEMALE: ICD-10-CM

## 2025-07-17 RX ORDER — ETONOGESTREL AND ETHINYL ESTRADIOL VAGINAL RING .015; .12 MG/D; MG/D
RING VAGINAL
Qty: 3 EACH | Refills: 1 | Status: SHIPPED | OUTPATIENT
Start: 2025-07-17

## 2025-08-04 ENCOUNTER — OFFICE VISIT (OUTPATIENT)
Dept: FAMILY MEDICINE | Facility: CLINIC | Age: 35
End: 2025-08-04
Payer: COMMERCIAL

## 2025-08-04 VITALS
DIASTOLIC BLOOD PRESSURE: 76 MMHG | OXYGEN SATURATION: 92 % | SYSTOLIC BLOOD PRESSURE: 123 MMHG | HEART RATE: 82 BPM | TEMPERATURE: 98 F | RESPIRATION RATE: 16 BRPM | HEIGHT: 61 IN | WEIGHT: 121.38 LBS | BODY MASS INDEX: 22.92 KG/M2

## 2025-08-04 DIAGNOSIS — Z11.59 NEED FOR HEPATITIS C SCREENING TEST: ICD-10-CM

## 2025-08-04 DIAGNOSIS — F90.9 ATTENTION DEFICIT HYPERACTIVITY DISORDER (ADHD), UNSPECIFIED ADHD TYPE: ICD-10-CM

## 2025-08-04 DIAGNOSIS — Z11.4 SCREENING FOR HIV (HUMAN IMMUNODEFICIENCY VIRUS): Primary | ICD-10-CM

## 2025-08-04 DIAGNOSIS — Z11.3 SCREENING EXAMINATION FOR STI: ICD-10-CM

## 2025-08-04 DIAGNOSIS — R10.2 PELVIC PAIN IN FEMALE: ICD-10-CM

## 2025-08-04 DIAGNOSIS — R05.9 COUGH, UNSPECIFIED TYPE: ICD-10-CM

## 2025-08-04 DIAGNOSIS — L70.0 ACNE VULGARIS: ICD-10-CM

## 2025-08-04 DIAGNOSIS — Z00.00 ROUTINE GENERAL MEDICAL EXAMINATION AT A HEALTH CARE FACILITY: ICD-10-CM

## 2025-08-04 DIAGNOSIS — N94.819 VULVODYNIA: ICD-10-CM

## 2025-08-04 PROCEDURE — G2211 COMPLEX E/M VISIT ADD ON: HCPCS

## 2025-08-04 PROCEDURE — 3078F DIAST BP <80 MM HG: CPT

## 2025-08-04 PROCEDURE — 87491 CHLMYD TRACH DNA AMP PROBE: CPT

## 2025-08-04 PROCEDURE — 99214 OFFICE O/P EST MOD 30 MIN: CPT | Mod: 25

## 2025-08-04 PROCEDURE — 1125F AMNT PAIN NOTED PAIN PRSNT: CPT

## 2025-08-04 PROCEDURE — 87591 N.GONORRHOEAE DNA AMP PROB: CPT

## 2025-08-04 PROCEDURE — 3074F SYST BP LT 130 MM HG: CPT

## 2025-08-04 PROCEDURE — 96127 BRIEF EMOTIONAL/BEHAV ASSMT: CPT

## 2025-08-04 PROCEDURE — 99395 PREV VISIT EST AGE 18-39: CPT

## 2025-08-04 RX ORDER — ALBUTEROL SULFATE 90 UG/1
2 INHALANT RESPIRATORY (INHALATION) EVERY 6 HOURS PRN
Qty: 18 G | Refills: 0 | Status: SHIPPED | OUTPATIENT
Start: 2025-08-04 | End: 2025-08-04

## 2025-08-04 RX ORDER — TRETINOIN 0.25 MG/G
CREAM TOPICAL AT BEDTIME
Qty: 20 G | Refills: 4 | Status: SHIPPED | OUTPATIENT
Start: 2025-08-04

## 2025-08-04 RX ORDER — BUPROPION HYDROCHLORIDE 300 MG/1
300 TABLET ORAL EVERY MORNING
Qty: 90 TABLET | Refills: 4 | Status: SHIPPED | OUTPATIENT
Start: 2025-08-04

## 2025-08-04 RX ORDER — METHOCARBAMOL 500 MG/1
500 TABLET, FILM COATED ORAL 2 TIMES DAILY PRN
Qty: 30 TABLET | Refills: 1 | Status: SHIPPED | OUTPATIENT
Start: 2025-08-04

## 2025-08-04 RX ORDER — ALBUTEROL SULFATE 90 UG/1
2 INHALANT RESPIRATORY (INHALATION) EVERY 6 HOURS PRN
Qty: 18 G | Refills: 11 | Status: SHIPPED | OUTPATIENT
Start: 2025-08-04

## 2025-08-04 RX ORDER — ESTRADIOL 0.1 MG/G
1 CREAM VAGINAL
Qty: 42.5 G | Refills: 3 | Status: SHIPPED | OUTPATIENT
Start: 2025-08-04

## 2025-08-04 SDOH — HEALTH STABILITY: PHYSICAL HEALTH: ON AVERAGE, HOW MANY MINUTES DO YOU ENGAGE IN EXERCISE AT THIS LEVEL?: 60 MIN

## 2025-08-04 SDOH — HEALTH STABILITY: PHYSICAL HEALTH: ON AVERAGE, HOW MANY DAYS PER WEEK DO YOU ENGAGE IN MODERATE TO STRENUOUS EXERCISE (LIKE A BRISK WALK)?: 6 DAYS

## 2025-08-04 ASSESSMENT — SOCIAL DETERMINANTS OF HEALTH (SDOH): HOW OFTEN DO YOU GET TOGETHER WITH FRIENDS OR RELATIVES?: THREE TIMES A WEEK

## 2025-08-04 ASSESSMENT — PAIN SCALES - GENERAL: PAINLEVEL_OUTOF10: MILD PAIN (2)

## 2025-08-04 ASSESSMENT — PATIENT HEALTH QUESTIONNAIRE - PHQ9: SUM OF ALL RESPONSES TO PHQ QUESTIONS 1-9: 0

## 2025-08-05 LAB
C TRACH DNA SPEC QL PROBE+SIG AMP: NEGATIVE
N GONORRHOEA DNA SPEC QL NAA+PROBE: NEGATIVE
SPECIMEN TYPE: NORMAL